# Patient Record
Sex: MALE | Race: WHITE | Employment: UNEMPLOYED | URBAN - METROPOLITAN AREA
[De-identification: names, ages, dates, MRNs, and addresses within clinical notes are randomized per-mention and may not be internally consistent; named-entity substitution may affect disease eponyms.]

---

## 2017-06-21 ENCOUNTER — HOSPITAL ENCOUNTER (OUTPATIENT)
Age: 32
Setting detail: OBSERVATION
Discharge: HOME | End: 2017-06-25
Attending: EMERGENCY MEDICINE | Admitting: HOSPITALIST
Payer: MEDICARE

## 2017-06-21 ENCOUNTER — APPOINTMENT (OUTPATIENT)
Dept: CT IMAGING | Age: 32
End: 2017-06-21
Attending: EMERGENCY MEDICINE
Payer: MEDICARE

## 2017-06-21 DIAGNOSIS — G40.909 SEIZURE DISORDER (HCC): Primary | ICD-10-CM

## 2017-06-21 DIAGNOSIS — T42.0X1S: ICD-10-CM

## 2017-06-21 DIAGNOSIS — G40.219 PARTIAL EPILEPSY WITH IMPAIRMENT OF CONSCIOUSNESS, INTRACTABLE (HCC): ICD-10-CM

## 2017-06-21 DIAGNOSIS — T42.0X1S ACCIDENTAL PHENYTOIN POISONING, SEQUELA: ICD-10-CM

## 2017-06-21 DIAGNOSIS — R26.81 UNSTEADY GAIT: ICD-10-CM

## 2017-06-21 DIAGNOSIS — G40.409 SYMPTOMATIC GENERALIZED EPILEPSY (HCC): ICD-10-CM

## 2017-06-21 LAB
ALBUMIN SERPL BCP-MCNC: 4.5 G/DL (ref 3.5–5)
ALBUMIN/GLOB SERPL: 1.1 {RATIO} (ref 1.1–2.2)
ALP SERPL-CCNC: 133 U/L (ref 45–117)
ALT SERPL-CCNC: 51 U/L (ref 12–78)
AMPHET UR QL SCN: NEGATIVE
ANION GAP BLD CALC-SCNC: 9 MMOL/L (ref 5–15)
APPEARANCE UR: CLEAR
AST SERPL W P-5'-P-CCNC: 25 U/L (ref 15–37)
BACTERIA URNS QL MICRO: NEGATIVE /HPF
BARBITURATES UR QL SCN: NEGATIVE
BASOPHILS # BLD AUTO: 0 K/UL (ref 0–0.1)
BASOPHILS # BLD: 0 % (ref 0–1)
BENZODIAZ UR QL: NEGATIVE
BILIRUB SERPL-MCNC: 0.4 MG/DL (ref 0.2–1)
BILIRUB UR QL CFM: NEGATIVE
BUN SERPL-MCNC: 13 MG/DL (ref 6–20)
BUN/CREAT SERPL: 14 (ref 12–20)
CALCIUM SERPL-MCNC: 9.6 MG/DL (ref 8.5–10.1)
CANNABINOIDS UR QL SCN: POSITIVE
CHLORIDE SERPL-SCNC: 104 MMOL/L (ref 97–108)
CK SERPL-CCNC: 159 U/L (ref 39–308)
CO2 SERPL-SCNC: 26 MMOL/L (ref 21–32)
COCAINE UR QL SCN: NEGATIVE
COLOR UR: ABNORMAL
CREAT SERPL-MCNC: 0.92 MG/DL (ref 0.7–1.3)
DRUG SCRN COMMENT,DRGCM: ABNORMAL
EOSINOPHIL # BLD: 0.2 K/UL (ref 0–0.4)
EOSINOPHIL NFR BLD: 3 % (ref 0–7)
EPITH CASTS URNS QL MICRO: ABNORMAL /LPF
ERYTHROCYTE [DISTWIDTH] IN BLOOD BY AUTOMATED COUNT: 12.9 % (ref 11.5–14.5)
GLOBULIN SER CALC-MCNC: 4.2 G/DL (ref 2–4)
GLUCOSE SERPL-MCNC: 96 MG/DL (ref 65–100)
GLUCOSE UR STRIP.AUTO-MCNC: NEGATIVE MG/DL
HCT VFR BLD AUTO: 44.6 % (ref 36.6–50.3)
HGB BLD-MCNC: 15.2 G/DL (ref 12.1–17)
HGB UR QL STRIP: NEGATIVE
HYALINE CASTS URNS QL MICRO: ABNORMAL /LPF (ref 0–5)
KETONES UR QL STRIP.AUTO: ABNORMAL MG/DL
LEUKOCYTE ESTERASE UR QL STRIP.AUTO: NEGATIVE
LYMPHOCYTES # BLD AUTO: 38 % (ref 12–49)
LYMPHOCYTES # BLD: 3 K/UL (ref 0.8–3.5)
MAGNESIUM SERPL-MCNC: 2.5 MG/DL (ref 1.6–2.4)
MCH RBC QN AUTO: 29.9 PG (ref 26–34)
MCHC RBC AUTO-ENTMCNC: 34.1 G/DL (ref 30–36.5)
MCV RBC AUTO: 87.8 FL (ref 80–99)
METHADONE UR QL: NEGATIVE
MONOCYTES # BLD: 0.6 K/UL (ref 0–1)
MONOCYTES NFR BLD AUTO: 8 % (ref 5–13)
NEUTS SEG # BLD: 4 K/UL (ref 1.8–8)
NEUTS SEG NFR BLD AUTO: 51 % (ref 32–75)
NITRITE UR QL STRIP.AUTO: NEGATIVE
OPIATES UR QL: NEGATIVE
PCP UR QL: NEGATIVE
PH UR STRIP: 6 [PH] (ref 5–8)
PLATELET # BLD AUTO: 285 K/UL (ref 150–400)
POTASSIUM SERPL-SCNC: 4.2 MMOL/L (ref 3.5–5.1)
PROT SERPL-MCNC: 8.7 G/DL (ref 6.4–8.2)
PROT UR STRIP-MCNC: NEGATIVE MG/DL
RBC # BLD AUTO: 5.08 M/UL (ref 4.1–5.7)
RBC #/AREA URNS HPF: ABNORMAL /HPF (ref 0–5)
SODIUM SERPL-SCNC: 139 MMOL/L (ref 136–145)
SP GR UR REFRACTOMETRY: 1.02 (ref 1–1.03)
UROBILINOGEN UR QL STRIP.AUTO: 1 EU/DL (ref 0.2–1)
WBC # BLD AUTO: 7.8 K/UL (ref 4.1–11.1)
WBC URNS QL MICRO: ABNORMAL /HPF (ref 0–4)

## 2017-06-21 PROCEDURE — 99285 EMERGENCY DEPT VISIT HI MDM: CPT

## 2017-06-21 PROCEDURE — 36415 COLL VENOUS BLD VENIPUNCTURE: CPT | Performed by: EMERGENCY MEDICINE

## 2017-06-21 PROCEDURE — 82550 ASSAY OF CK (CPK): CPT | Performed by: EMERGENCY MEDICINE

## 2017-06-21 PROCEDURE — 81001 URINALYSIS AUTO W/SCOPE: CPT | Performed by: EMERGENCY MEDICINE

## 2017-06-21 PROCEDURE — 80177 DRUG SCRN QUAN LEVETIRACETAM: CPT | Performed by: EMERGENCY MEDICINE

## 2017-06-21 PROCEDURE — 85025 COMPLETE CBC W/AUTO DIFF WBC: CPT | Performed by: EMERGENCY MEDICINE

## 2017-06-21 PROCEDURE — 99218 HC RM OBSERVATION: CPT

## 2017-06-21 PROCEDURE — 80053 COMPREHEN METABOLIC PANEL: CPT | Performed by: EMERGENCY MEDICINE

## 2017-06-21 PROCEDURE — 95816 EEG AWAKE AND DROWSY: CPT | Performed by: HOSPITALIST

## 2017-06-21 PROCEDURE — 80307 DRUG TEST PRSMV CHEM ANLYZR: CPT | Performed by: EMERGENCY MEDICINE

## 2017-06-21 PROCEDURE — 83735 ASSAY OF MAGNESIUM: CPT | Performed by: EMERGENCY MEDICINE

## 2017-06-21 PROCEDURE — 70450 CT HEAD/BRAIN W/O DYE: CPT

## 2017-06-21 RX ORDER — SODIUM CHLORIDE 0.9 % (FLUSH) 0.9 %
5-10 SYRINGE (ML) INJECTION AS NEEDED
Status: DISCONTINUED | OUTPATIENT
Start: 2017-06-21 | End: 2017-06-25 | Stop reason: HOSPADM

## 2017-06-21 RX ORDER — ENOXAPARIN SODIUM 100 MG/ML
40 INJECTION SUBCUTANEOUS EVERY 24 HOURS
Status: DISCONTINUED | OUTPATIENT
Start: 2017-06-21 | End: 2017-06-24

## 2017-06-21 RX ORDER — SODIUM CHLORIDE 0.9 % (FLUSH) 0.9 %
5-10 SYRINGE (ML) INJECTION EVERY 8 HOURS
Status: DISCONTINUED | OUTPATIENT
Start: 2017-06-21 | End: 2017-06-25 | Stop reason: HOSPADM

## 2017-06-21 NOTE — IP AVS SNAPSHOT
Current Discharge Medication List  
  
CONTINUE these medications which have CHANGED Dose & Instructions Dispensing Information Comments Morning Noon Evening Bedtime  
 lamoTRIgine 200 mg tablet Commonly known as: LaMICtal  
What changed:   
- medication strength 
- how much to take Your last dose was: Your next dose is:    
   
   
 Dose:  200 mg Take 1 Tab by mouth two (2) times a day. Indications: seizure disorder Quantity:  60 Tab Refills:  0 CONTINUE these medications which have NOT CHANGED Dose & Instructions Dispensing Information Comments Morning Noon Evening Bedtime  
 clonazePAM 1 mg tablet Commonly known as:  Sabrina Michel Your last dose was: Your next dose is:    
   
   
 Dose:  1 mg Take 1 Tab by mouth three (3) times daily. Max Daily Amount: 3 mg. Indications: MYOCLONIC EPILEPSY Quantity:  21 Tab Refills:  0 FLUoxetine 40 mg capsule Commonly known as:  PROzac Your last dose was: Your next dose is:    
   
   
 Dose:  40 mg Take 1 Cap by mouth daily. Quantity:  7 Cap Refills:  0  
     
   
   
   
  
 levETIRAcetam 750 mg tablet Commonly known as:  KEPPRA Your last dose was: Your next dose is:    
   
   
 Dose:  1500 mg Take 2 Tabs by mouth two (2) times a day. Indications: TONIC-CLONIC EPILEPSY TREATMENT ADJUNCT Quantity:  120 Tab Refills:  0  
     
   
   
   
  
 lisinopril 20 mg tablet Commonly known as:  Deanmanan Pérez Your last dose was: Your next dose is:    
   
   
 Dose:  20 mg Take 20 mg by mouth daily. Refills:  0  
     
   
   
   
  
 lovastatin 20 mg tablet Commonly known as:  MEVACOR Your last dose was: Your next dose is:    
   
   
 Dose:  20 mg Take 20 mg by mouth nightly. Refills:  0 STOP taking these medications  DILANTIN PO  
   
  
 hydroCHLOROthiazide 25 mg tablet Commonly known as:  HYDRODIURIL Where to Get Your Medications Information on where to get these meds will be given to you by the nurse or doctor. ! Ask your nurse or doctor about these medications  
  clonazePAM 1 mg tablet FLUoxetine 40 mg capsule  
 lamoTRIgine 200 mg tablet  
 levETIRAcetam 750 mg tablet

## 2017-06-21 NOTE — IP AVS SNAPSHOT
[address] [place] [address] 
[phone] Patient: Natan Samayoa MRN: [id number] TAD:[id number] You are allergic to the following Allergen Reactions Iodine Anaphylaxis Fish Containing Products Anaphylaxis Shellfish Containing Products Anaphylaxis Recent Documentation Height Weight BMI Smoking Status 1.778 m 119.7 kg 37.86 kg/m2 Current Every Day Smoker Unresulted Labs Order Current Status LAMOTRIGINE (LAMICTAL) In process Emergency Contacts Name Discharge Info Relation Home Work Mobile April Noss  Sister [23] [phone] Toy Solis  Other Relative [6] [phone] Nabeel Olivarez DISCHARGE CAREGIVER [3] Life Partner [7]   [phone] About your hospitalization You were admitted on:  June 21, 2017 You last received care in the:  [facility] 6S NEURO-SCI TELE You were discharged on:  June 25, 2017 Unit phone number:  [phone] Why you were hospitalized Your primary diagnosis was:  Symptomatic Generalized Epilepsy (Hcc) Your diagnoses also included:  History Of Craniotomy, Dilantin Toxicity, Marijuana Use, Nicotine Dependence Providers Seen During Your Hospitalizations Provider Role Specialty Primary office phone Elías Gale MD Attending Provider Emergency Medicine [phone] Zoltan Lynne MD Attending Provider Internal Medicine [phone] Frann Sandifer, MD Attending Provider Internal Medicine [phone] Your Primary Care Physician (PCP) Primary Care Physician Office Phone Office Fax OTHER, PHYS ** None ** ** None ** Follow-up Information Follow up With Details Comments Contact Info Dr Sesar Holliday, Neurologist  1 week or as scheduled following discharge from hospital   
 Phys MD Gina   Patient can only remember the practice name and not the physician Current Discharge Medication List  
  
CONTINUE these medications which have CHANGED Dose & Instructions Dispensing Information Comments Morning Noon Evening Bedtime  
 lamoTRIgine 200 mg tablet Commonly known as: LaMICtal  
What changed:   
- medication strength 
- how much to take Your last dose was: Your next dose is:    
   
   
 Dose:  200 mg Take 1 Tab by mouth two (2) times a day. Indications: seizure disorder Quantity:  60 Tab Refills:  0 CONTINUE these medications which have NOT CHANGED Dose & Instructions Dispensing Information Comments Morning Noon Evening Bedtime  
 clonazePAM 1 mg tablet Commonly known as:  Kaitlin Ny Your last dose was: Your next dose is:    
   
   
 Dose:  1 mg Take 1 Tab by mouth three (3) times daily. Max Daily Amount: 3 mg. Indications: MYOCLONIC EPILEPSY Quantity:  21 Tab Refills:  0 FLUoxetine 40 mg capsule Commonly known as:  PROzac Your last dose was: Your next dose is:    
   
   
 Dose:  40 mg Take 1 Cap by mouth daily. Quantity:  7 Cap Refills:  0  
     
   
   
   
  
 levETIRAcetam 750 mg tablet Commonly known as:  KEPPRA Your last dose was: Your next dose is:    
   
   
 Dose:  1500 mg Take 2 Tabs by mouth two (2) times a day. Indications: TONIC-CLONIC EPILEPSY TREATMENT ADJUNCT Quantity:  120 Tab Refills:  0  
     
   
   
   
  
 lisinopril 20 mg tablet Commonly known as:  Rexdedrick  Your last dose was: Your next dose is:    
   
   
 Dose:  20 mg Take 20 mg by mouth daily. Refills:  0  
     
   
   
   
  
 lovastatin 20 mg tablet Commonly known as:  MEVACOR Your last dose was: Your next dose is:    
   
   
 Dose:  20 mg Take 20 mg by mouth nightly. Refills:  0 STOP taking these medications  DILANTIN PO  
   
  
 hydroCHLOROthiazide 25 mg tablet Commonly known as:  HYDRODIURIL Where to Get Your Medications Information on where to get these meds will be given to you by the nurse or doctor. ! Ask your nurse or doctor about these medications  
  clonazePAM 1 mg tablet FLUoxetine 40 mg capsule  
 lamoTRIgine 200 mg tablet  
 levETIRAcetam 750 mg tablet Discharge Instructions ADDITIONAL CARE RECOMMENDATIONS:  
1. Take medications as prescribed. 2. Keep appointment with Neurologist as scheduled. 3. Dilantin has been stopped, and you should NOT take it again. 4. You should establish care under a PCP and Psychiatrist. 
5. HCTZ has been stopped due to low blood pressure and low potassium. DIET: Cardiac Diet ACTIVITY: activity as tolerated; no driving for at least 6 months WOUND CARE: none EQUIPMENT needed: none Stopping Smoking: Care Instructions Your Care Instructions Cigarette smokers crave the nicotine in cigarettes. Giving it up is much harder than simply changing a habit. Your body has to stop craving the nicotine. It is hard to quit, but you can do it. There are many tools that people use to quit smoking. You may find that combining tools works best for you. There are several steps to quitting. First you get ready to quit. Then you get support to help you. After that, you learn new skills and behaviors to become a nonsmoker. For many people, a necessary step is getting and using medicine. Your doctor will help you set up the plan that best meets your needs. You may want to attend a smoking cessation program to help you quit smoking. When you choose a program, look for one that has proven success. Ask your doctor for ideas.  You will greatly increase your chances of success if you take medicine as well as get counseling or join a cessation program. 
Some of the changes you feel when you first quit tobacco are uncomfortable. Your body will miss the nicotine at first, and you may feel short-tempered and grumpy. You may have trouble sleeping or concentrating. Medicine can help you deal with these symptoms. You may struggle with changing your smoking habits and rituals. The last step is the tricky one: Be prepared for the smoking urge to continue for a time. This is a lot to deal with, but keep at it. You will feel better. Follow-up care is a key part of your treatment and safety. Be sure to make and go to all appointments, and call your doctor if you are having problems. Its also a good idea to know your test results and keep a list of the medicines you take. How can you care for yourself at home? · Ask your family, friends, and coworkers for support. You have a better chance of quitting if you have help and support. · Join a support group, such as Nicotine Anonymous, for people who are trying to quit smoking. · Consider signing up for a smoking cessation program, such as the American Lung Association's Freedom from Smoking program. 
· Set a quit date. Pick your date carefully so that it is not right in the middle of a big deadline or stressful time. Once you quit, do not even take a puff. Get rid of all ashtrays and lighters after your last cigarette. Clean your house and your clothes so that they do not smell of smoke. · Learn how to be a nonsmoker. Think about ways you can avoid those things that make you reach for a cigarette. ¨ Avoid situations that put you at greatest risk for smoking. For some people, it is hard to have a drink with friends without smoking. For others, they might skip a coffee break with coworkers who smoke. ¨ Change your daily routine. Take a different route to work or eat a meal in a different place. · Cut down on stress. Calm yourself or release tension by doing an activity you enjoy, such as reading a book, taking a hot bath, or gardening. · Talk to your doctor or pharmacist about nicotine replacement therapy, which replaces the nicotine in your body. You still get nicotine but you do not use tobacco. Nicotine replacement products help you slowly reduce the amount of nicotine you need. These products come in several forms, many of them available over-the-counter: ¨ Nicotine patches ¨ Nicotine gum and lozenges ¨ Nicotine inhaler · Ask your doctor about bupropion (Wellbutrin) or varenicline (Chantix), which are prescription medicines. They do not contain nicotine. They help you by reducing withdrawal symptoms, such as stress and anxiety. · Some people find hypnosis, acupuncture, and massage helpful for ending the smoking habit. · Eat a healthy diet and get regular exercise. Having healthy habits will help your body move past its craving for nicotine. · Be prepared to keep trying. Most people are not successful the first few times they try to quit. Do not get mad at yourself if you smoke again. Make a list of things you learned and think about when you want to try again, such as next week, next month, or next year. Where can you learn more? Go to http://[employer].info/. Enter K723 in the search box to learn more about \"Stopping Smoking: Care Instructions. \" Current as of: March 20, 2017 Content Version: 11.3 © 1061-4734 [employer]. Care instructions adapted under license by [facility] (which disclaims liability or warranty for this information). If you have questions about a medical condition or this instruction, always ask your healthcare professional. [place] [address] any warranty or liability for your use of this information. Discharge Orders None [facility] Announcement We are excited to announce that we are making your provider's discharge notes available to you in [employer].   You will see these notes when they are completed and signed by the physician that discharged you from your recent hospital stay. If you have any questions or concerns about any information you see in [employer], please call the Health Information Department where you were seen or reach out to your Primary Care Provider for more information about your plan of care. Introducing [facility]! Mariela Hill introduces [employer] patient portal. Now you can access parts of your medical record, email your doctor's office, and request medication refills online. 1. In your internet browser, go to [email or web address] 2. Click on the First Time User? Click Here link in the Sign In box. You will see the New Member Sign Up page. 3. Enter your [employer] Access Code exactly as it appears below. You will not need to use this code after youve completed the sign-up process. If you do not sign up before the expiration date, you must request a new code. · [employer] Access Code: [email or web address] Expires: 8/9/2017  9:05 PM 
 
4. Enter the last four digits of your Social Security Number (xxxx) and Date of Birth (mm/dd/yyyy) as indicated and click Submit. You will be taken to the next sign-up page. 5. Create a [employer] ID. This will be your [employer] login ID and cannot be changed, so think of one that is secure and easy to remember. 6. Create a [employer] password. You can change your password at any time. 7. Enter your Password Reset Question and Answer. This can be used at a later time if you forget your password. 8. Enter your e-mail address. You will receive e-mail notification when new information is available in [address] 9. Click Sign Up. You can now view and download portions of your medical record. 10. Click the Download Summary menu link to download a portable copy of your medical information.  
 
If you have questions, please visit the Frequently Asked Questions section of the [employer]. Remember, MyChart is NOT to be used for urgent needs. For medical emergencies, dial 911. Now available from your iPhone and Android! General Information Please provide this summary of care documentation to your next provider. Patient Signature:  ____________________________________________________________ Date:  ____________________________________________________________  
  
Sylvester Bough Provider Signature:  ____________________________________________________________ Date:  ____________________________________________________________
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
## 2017-06-22 LAB
GLUCOSE BLD STRIP.AUTO-MCNC: 127 MG/DL (ref 65–100)
PHENYTOIN SERPL-MCNC: 30.2 UG/ML (ref 10–20)
SERVICE CMNT-IMP: ABNORMAL

## 2017-06-22 PROCEDURE — 99218 HC RM OBSERVATION: CPT

## 2017-06-22 PROCEDURE — 36415 COLL VENOUS BLD VENIPUNCTURE: CPT | Performed by: HOSPITALIST

## 2017-06-22 PROCEDURE — 80175 DRUG SCREEN QUAN LAMOTRIGINE: CPT | Performed by: PSYCHIATRY & NEUROLOGY

## 2017-06-22 PROCEDURE — 80177 DRUG SCRN QUAN LEVETIRACETAM: CPT | Performed by: HOSPITALIST

## 2017-06-22 PROCEDURE — 74011250637 HC RX REV CODE- 250/637: Performed by: HOSPITALIST

## 2017-06-22 PROCEDURE — 74011250636 HC RX REV CODE- 250/636: Performed by: NURSE PRACTITIONER

## 2017-06-22 PROCEDURE — 80185 ASSAY OF PHENYTOIN TOTAL: CPT | Performed by: HOSPITALIST

## 2017-06-22 PROCEDURE — 82962 GLUCOSE BLOOD TEST: CPT

## 2017-06-22 RX ORDER — HYDROCHLOROTHIAZIDE 25 MG/1
25 TABLET ORAL DAILY
Status: DISCONTINUED | OUTPATIENT
Start: 2017-06-22 | End: 2017-06-25 | Stop reason: HOSPADM

## 2017-06-22 RX ORDER — LISINOPRIL 10 MG/1
20 TABLET ORAL DAILY
Status: DISCONTINUED | OUTPATIENT
Start: 2017-06-22 | End: 2017-06-25 | Stop reason: HOSPADM

## 2017-06-22 RX ORDER — LOVASTATIN 20 MG/1
20 TABLET ORAL
Status: DISCONTINUED | OUTPATIENT
Start: 2017-06-22 | End: 2017-06-25 | Stop reason: HOSPADM

## 2017-06-22 RX ORDER — ACETAMINOPHEN 325 MG/1
650 TABLET ORAL
Status: DISCONTINUED | OUTPATIENT
Start: 2017-06-22 | End: 2017-06-25 | Stop reason: HOSPADM

## 2017-06-22 RX ORDER — HYDROMORPHONE HYDROCHLORIDE 1 MG/ML
1 INJECTION, SOLUTION INTRAMUSCULAR; INTRAVENOUS; SUBCUTANEOUS
Status: DISCONTINUED | OUTPATIENT
Start: 2017-06-22 | End: 2017-06-23

## 2017-06-22 RX ORDER — LEVETIRACETAM 500 MG/1
1500 TABLET ORAL 2 TIMES DAILY
Status: DISCONTINUED | OUTPATIENT
Start: 2017-06-22 | End: 2017-06-25 | Stop reason: HOSPADM

## 2017-06-22 RX ORDER — PHENYTOIN SODIUM 100 MG/1
300 CAPSULE, EXTENDED RELEASE ORAL 2 TIMES DAILY
Status: DISCONTINUED | OUTPATIENT
Start: 2017-06-22 | End: 2017-06-22

## 2017-06-22 RX ORDER — LAMOTRIGINE 100 MG/1
200 TABLET ORAL 2 TIMES DAILY
Status: DISCONTINUED | OUTPATIENT
Start: 2017-06-22 | End: 2017-06-25 | Stop reason: HOSPADM

## 2017-06-22 RX ORDER — CLONAZEPAM 1 MG/1
1 TABLET ORAL 3 TIMES DAILY
Status: DISCONTINUED | OUTPATIENT
Start: 2017-06-22 | End: 2017-06-25 | Stop reason: HOSPADM

## 2017-06-22 RX ORDER — FLUOXETINE HYDROCHLORIDE 20 MG/1
40 CAPSULE ORAL DAILY
Status: DISCONTINUED | OUTPATIENT
Start: 2017-06-22 | End: 2017-06-25 | Stop reason: HOSPADM

## 2017-06-22 RX ADMIN — Medication 10 ML: at 01:33

## 2017-06-22 RX ADMIN — HYDROMORPHONE HYDROCHLORIDE 1 MG: 1 INJECTION, SOLUTION INTRAMUSCULAR; INTRAVENOUS; SUBCUTANEOUS at 22:38

## 2017-06-22 RX ADMIN — CLONAZEPAM 1 MG: 1 TABLET ORAL at 16:31

## 2017-06-22 RX ADMIN — LEVETIRACETAM 1500 MG: 500 TABLET ORAL at 17:44

## 2017-06-22 RX ADMIN — Medication 10 ML: at 21:19

## 2017-06-22 RX ADMIN — Medication 5 ML: at 06:47

## 2017-06-22 RX ADMIN — HYDROCHLOROTHIAZIDE 25 MG: 25 TABLET ORAL at 09:01

## 2017-06-22 RX ADMIN — FLUOXETINE 40 MG: 20 CAPSULE ORAL at 09:01

## 2017-06-22 RX ADMIN — Medication 10 ML: at 14:00

## 2017-06-22 RX ADMIN — LISINOPRIL 20 MG: 10 TABLET ORAL at 09:01

## 2017-06-22 RX ADMIN — LEVETIRACETAM 1500 MG: 500 TABLET ORAL at 09:01

## 2017-06-22 RX ADMIN — LOVASTATIN 20 MG: 20 TABLET ORAL at 01:45

## 2017-06-22 RX ADMIN — CLONAZEPAM 1 MG: 1 TABLET ORAL at 09:01

## 2017-06-22 RX ADMIN — LOVASTATIN 20 MG: 20 TABLET ORAL at 21:19

## 2017-06-22 RX ADMIN — CLONAZEPAM 1 MG: 1 TABLET ORAL at 21:19

## 2017-06-22 RX ADMIN — LAMOTRIGINE 200 MG: 100 TABLET ORAL at 17:44

## 2017-06-22 RX ADMIN — LAMOTRIGINE 200 MG: 100 TABLET ORAL at 09:00

## 2017-06-22 NOTE — PROGRESS NOTES
Primary Nurse Aiyana Brown, JR and Gracie Dao RN performed a dual skin assessment on this patient No impairment noted  Dereck score is 23    [address] aware that patient corrected the medication after MD reviewed the med in ED. Patient first dose seizure medication are in the morning and need to review the doses with MD in the morning.

## 2017-06-22 NOTE — PROGRESS NOTES
Bedside and Verbal shift change report given to [address] (oncoming nurse) by JACKSON Sanford RN (offgoing nurse). Report given with SBAR, Kardex, Intake/Output, MAR and Recent Results.
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
## 2017-06-22 NOTE — CONSULTS
Consult dictated. Recurrent seizure. Currently dilantin toxic. Also on Clonazepam, Lamotrigine and Keppra. Hold dilantin. Check all levels  EEG no seizure activity.   Chelo Herrera MD

## 2017-06-22 NOTE — ED TRIAGE NOTES
Triage:  Pt to ED due to multiple seizure like events throughout the day. Pt states he has had a total of 7 seizures throughout the day. Pt states the seizures are grand mal in description with LOC following each event. Pt states he does note clearly remember the events and states when he comes out he is disoriented and has difficulty talking and interaction with others. Pt states he is uncertain of any falls pre/post seizures. Pt also states he has severe pain to base of his head and upper neck, Pt states the pain increases with rotation of his head more noted to the left. Pt also mentions that his lips are intermittently tingly. Pt states he generally aches and is fatigued from the seizure events.

## 2017-06-22 NOTE — PROGRESS NOTES
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 He was placed in OBS NSTU for breakthrough seizures and found to have supratherapeutic VPA level. Interval history / Subjective:   Still c/o numbness and tingling in lips, all over muscle pains, dizziness and lightheadedness; currently getting prepped for EEG     Assessment & Plan:     Breakthrough seizures on chronic seizure disorder (POA)  - no seizures since presentation  - supratherapeutic phenytoin level; held on admission; has been supratherapeutic for a long time per our EMR  - continue Lamictal and Keppra; Keppra level pending  - EEG in process  - Neuro consulted; need AEDs adjusted  - seizure precautions  - CT head wo contrast  right encephalomalacia, no acute  - sees Dr Estephanie Lozano, neurologist in [place], [place]    Supratherapeutic phenytoin level (POA) - management as above    Chronic HTN - controlled with lisinopril/HCTZ    HLD - lovastatin    Substance abuse (POA) - UDS +THC, cessation counseling    Nicotine dependence - cessation counseling, declined nicotine patch    Code status: Full  DVT prophylaxis: SCDs    Care Plan discussed with: Patient/Family, Nurse and   Disposition: Likely home today or tomorrow     Hospital Problems  Date Reviewed: 2014          Codes Class Noted POA    Seizures ([facility] [address] ICD-10-CM: R56.9  ICD-9-CM: 780.39  2014 Yes            Review of Systems:   Pertinent items are noted in HPI. Vital Signs:    Last 24hrs VS reviewed since prior progress note.  Most recent are:  Visit Vitals    /77 (BP 1 Location: Right arm, BP Patient Position: At rest)    Pulse 68    Temp 98 °F (36.7 °C)    Resp 19    Ht 5' 10\" (1.778 m)    Wt 117.7 kg (259 lb 7.7 oz)    SpO2 98%    BMI 37.23 kg/m2     No intake or output data in the 24 hours ending 06/22/17 0834     Physical Examination:     Constitutional:  awake, no acute distress, cooperative, pleasant, getting EEG    ENT:  oral mucosa moist, oropharynx benign  Neck supple   Resp:  CTA bilaterally, no wheezing/rhonchi/rales   CV:  regular rhythm, normal rate, no m/r/g appreciated, no edema, +pulses    GI:  +BS, soft, non distended, non tender     Musculoskeletal:  moves all extremities    Neurologic:  AAOx3, NFD     Skin:  warm, dry  Eyes:  PERRL, dysconjugate gaze    Data Review:    Review and/or order of clinical lab test  Review and/or order of tests in the radiology section of CPT  Review and/or order of tests in the medicine section of CPT    Labs:     Recent Labs      06/21/17 2059   WBC  7.8   HGB  15.2   HCT  44.6   PLT  285     Recent Labs      06/21/17 2059   NA  139   K  4.2   CL  104   CO2  26   BUN  13   CREA  0.92   GLU  96   CA  9.6   MG  2.5*     Recent Labs      06/21/17 2059   SGOT  25   ALT  51   AP  133*   TBILI  0.4   TP  8.7*   ALB  4.5   GLOB  4.2*     No results for input(s): INR, PTP, APTT in the last 72 hours. No lab exists for component: INREXT   No results for input(s): FE, TIBC, PSAT, FERR in the last 72 hours. No results found for: FOL, RBCF   No results for input(s): PH, PCO2, PO2 in the last 72 hours.   Recent Labs      06/21/17 2059   CPK  159     Lab Results   Component Value Date/Time    Cholesterol, total 168 07/18/2014 12:52 AM    HDL Cholesterol 36 07/18/2014 12:52 AM    LDL, calculated 112.8 07/18/2014 12:52 AM    Triglyceride 96 07/18/2014 12:52 AM    CHOL/HDL Ratio 4.7 07/18/2014 12:52 AM     Lab Results   Component Value Date/Time    Glucose (POC) 89 12/24/2016 11:57 PM Glucose (POC) 86 03/06/2012 11:06 AM    Glucose (POC) 127 01/26/2012 09:24 PM    Glucose (POC) 124 01/02/2012 07:52 AM     Lab Results   Component Value Date/Time    Color DARK YELLOW 06/21/2017 09:21 PM    Appearance CLEAR 06/21/2017 09:21 PM    Specific gravity 1.025 06/21/2017 09:21 PM    Specific gravity 1.022 01/27/2012 12:45 PM    pH (UA) 6.0 06/21/2017 09:21 PM    Protein NEGATIVE  06/21/2017 09:21 PM    Glucose NEGATIVE  06/21/2017 09:21 PM    Ketone TRACE 06/21/2017 09:21 PM    Bilirubin NEGATIVE  12/23/2016 08:30 PM    Urobilinogen 1.0 06/21/2017 09:21 PM    Nitrites NEGATIVE  06/21/2017 09:21 PM    Leukocyte Esterase NEGATIVE  06/21/2017 09:21 PM    Epithelial cells FEW 06/21/2017 09:21 PM    Bacteria NEGATIVE  06/21/2017 09:21 PM    WBC 0-4 06/21/2017 09:21 PM    RBC 0-5 06/21/2017 09:21 PM         Medications Reviewed:     Current Facility-Administered Medications   Medication Dose Route Frequency    clonazePAM (KlonoPIN) tablet 1 mg  1 mg Oral TID    FLUoxetine (PROzac) capsule 40 mg  40 mg Oral DAILY    hydroCHLOROthiazide (HYDRODIURIL) tablet 25 mg  25 mg Oral DAILY    lamoTRIgine (LaMICtal) tablet 200 mg  200 mg Oral BID    levETIRAcetam (KEPPRA) tablet 1,500 mg  1,500 mg Oral BID    lisinopril (PRINIVIL, ZESTRIL) tablet 20 mg  20 mg Oral DAILY    lovastatin (MEVACOR) tablet 20 mg  20 mg Oral QHS    sodium chloride (NS) flush 5-10 mL  5-10 mL IntraVENous Q8H    sodium chloride (NS) flush 5-10 mL  5-10 mL IntraVENous PRN    enoxaparin (LOVENOX) injection 40 mg  40 mg SubCUTAneous Q24H     ______________________________________________________________________  EXPECTED LENGTH OF STAY: - - -  ACTUAL LENGTH OF STAY:          0                 Terry Blancas MD

## 2017-06-22 NOTE — ED NOTES
Patient left department with medic on cardiac monitor for transportation to inpatient bed. Patient's VS at the time of transfer were /68, 98% on RA, denies pain at this time, 98.6 orally, HR 69 rhythm on the monitor. Patient was alert and oriented x 4 and denies further needs at time of transfer, still remains with no seizure like activity. TRANSFER - OUT REPORT:    Verbal report given to Elenita Natarajan RN(name) on Kacy Leos  being transferred to [address] [place] [address](unit) for routine progression of care       Report consisted of patients Situation, Background, Assessment and   Recommendations(SBAR). Information from the following report(s) SBAR, Kardex, ED Summary, [facility] and Recent Results was reviewed with the receiving nurse. Opportunity for questions and clarification was provided.

## 2017-06-22 NOTE — PROCEDURES
[address]   [address] [place] [address], [address]   EEG       Name:  Jc Abraham   MR#:  [id number]   :  1985   Account #:  [de-identified]    Date of Procedure:  2017   Date of Adm:  2017       REQUESTING PHYSICIAN: Dr. Roberto Mayer    HISTORY: The patient is a 28-year-old male who is being evaluated   for recurrent seizures. DESCRIPTION: This is an 18-channel EEG performed on an awake   patient. The dominant posterior background rhythm consists of   symmetric, well-modulated, medium voltage rhythms in the 8 Hz   frequency range out of the posterior head region. Frontal regions show   slower theta frequencies. The slowing is slightly more prominent in   the right frontal head region. Photic stimulation and hyperventilation   are not performed. Drowsiness and sleep architecture was not noted. EEG SUMMARY: Mildly abnormal EEG with mild slowing seen in both   the frontal head regions, right more than left. CLINICAL INTERPRETATION: This EEG shows mild focal slowing in   the right frontal area without any associated epileptiform features. No   ongoing electroclinical seizure activity was noted. Please correlate   clinically with the imaging studies.          Elmer Kennedy MD      AS / DEVYN   D:  2017   14:37   T:  2017   16:40   Job #:  [id number]

## 2017-06-22 NOTE — INTERDISCIPLINARY ROUNDS
IDR/SLIDR Summary          Patient: Miguelina Vazquez MRN: [id number]    Age: 28 y.o. YOB: 1985 Room/Bed: [facility]/   Admit Diagnosis: Seizures (HCC)  Principal Diagnosis: <principal problem not specified>   Goals: Seizure precautions  Readmission: NO  Quality Measure: Not applicable  VTE Prophylaxis: Chemical  Influenza Vaccine screening completed? YES  Pneumococcal Vaccine screening completed? NO  Mobility needs: No   Nutrition plan:No  Consults:Case Management    Financial concerns:No  Escalated to CM? YES  RRAT Score: 23   Interventions:  Testing due for pt today?  YES  LOS: 0 days Expected length of stay 1 days  Discharge plan: Home   PCP: Arabella Fuentes MD  Transportation needs: No    Days before discharge:one day until discharge   Discharge disposition: Home    Signed:     Laura Hernandez RN  6/22/2017  9:00 AM

## 2017-06-22 NOTE — ED NOTES
Patient ambulatory to treatment room and CT without issue. No seizure like activity at this time. Will continue to monitor, seizure pads placed on bed.

## 2017-06-22 NOTE — ED NOTES
Patient sleeping in room, aware of pending admission.  Pt remains with no seizure like activity since arrival.

## 2017-06-22 NOTE — ROUTINE PROCESS
TRANSFER - IN REPORT:    Verbal report received from Edmundo Aguilar RN(name) on Brandan Perkins  being received from ED(unit) for routine progression of care      Report consisted of patients Situation, Background, Assessment and   Recommendations(SBAR). Information from the following report(s) SBAR, Kardex, ED Summary, [facility] and Recent Results was reviewed with the receiving nurse. Opportunity for questions and clarification was provided. Assessment completed upon patients arrival to unit and care assumed.
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
## 2017-06-23 PROBLEM — Z98.890 HISTORY OF CRANIOTOMY: Status: ACTIVE | Noted: 2017-06-23

## 2017-06-23 LAB
ANION GAP BLD CALC-SCNC: 8 MMOL/L (ref 5–15)
BUN SERPL-MCNC: 14 MG/DL (ref 6–20)
BUN/CREAT SERPL: 17 (ref 12–20)
CALCIUM SERPL-MCNC: 8.6 MG/DL (ref 8.5–10.1)
CHLORIDE SERPL-SCNC: 104 MMOL/L (ref 97–108)
CO2 SERPL-SCNC: 25 MMOL/L (ref 21–32)
CREAT SERPL-MCNC: 0.84 MG/DL (ref 0.7–1.3)
ERYTHROCYTE [DISTWIDTH] IN BLOOD BY AUTOMATED COUNT: 12.7 % (ref 11.5–14.5)
GLUCOSE BLD STRIP.AUTO-MCNC: 108 MG/DL (ref 65–100)
GLUCOSE BLD STRIP.AUTO-MCNC: 111 MG/DL (ref 65–100)
GLUCOSE BLD STRIP.AUTO-MCNC: 81 MG/DL (ref 65–100)
GLUCOSE BLD STRIP.AUTO-MCNC: 95 MG/DL (ref 65–100)
GLUCOSE SERPL-MCNC: 114 MG/DL (ref 65–100)
HCT VFR BLD AUTO: 41.7 % (ref 36.6–50.3)
HGB BLD-MCNC: 14.5 G/DL (ref 12.1–17)
LEVETIRACETAM SERPL-MCNC: 1.3 UG/ML (ref 10–40)
LEVETIRACETAM SERPL-MCNC: NORMAL UG/ML (ref 10–40)
MAGNESIUM SERPL-MCNC: 2.2 MG/DL (ref 1.6–2.4)
MCH RBC QN AUTO: 30.2 PG (ref 26–34)
MCHC RBC AUTO-ENTMCNC: 34.8 G/DL (ref 30–36.5)
MCV RBC AUTO: 86.9 FL (ref 80–99)
PHENYTOIN SERPL-MCNC: 31.6 UG/ML (ref 10–20)
PLATELET # BLD AUTO: 244 K/UL (ref 150–400)
POTASSIUM SERPL-SCNC: 3.6 MMOL/L (ref 3.5–5.1)
RBC # BLD AUTO: 4.8 M/UL (ref 4.1–5.7)
SERVICE CMNT-IMP: ABNORMAL
SERVICE CMNT-IMP: ABNORMAL
SERVICE CMNT-IMP: NORMAL
SERVICE CMNT-IMP: NORMAL
SODIUM SERPL-SCNC: 137 MMOL/L (ref 136–145)
WBC # BLD AUTO: 5.9 K/UL (ref 4.1–11.1)

## 2017-06-23 PROCEDURE — 74011250637 HC RX REV CODE- 250/637: Performed by: HOSPITALIST

## 2017-06-23 PROCEDURE — 36415 COLL VENOUS BLD VENIPUNCTURE: CPT | Performed by: INTERNAL MEDICINE

## 2017-06-23 PROCEDURE — 82962 GLUCOSE BLOOD TEST: CPT

## 2017-06-23 PROCEDURE — 83735 ASSAY OF MAGNESIUM: CPT | Performed by: INTERNAL MEDICINE

## 2017-06-23 PROCEDURE — 80177 DRUG SCRN QUAN LEVETIRACETAM: CPT | Performed by: PSYCHIATRY & NEUROLOGY

## 2017-06-23 PROCEDURE — 74011250637 HC RX REV CODE- 250/637: Performed by: INTERNAL MEDICINE

## 2017-06-23 PROCEDURE — 80185 ASSAY OF PHENYTOIN TOTAL: CPT | Performed by: NURSE PRACTITIONER

## 2017-06-23 PROCEDURE — 96374 THER/PROPH/DIAG INJ IV PUSH: CPT

## 2017-06-23 PROCEDURE — 96361 HYDRATE IV INFUSION ADD-ON: CPT

## 2017-06-23 PROCEDURE — 80048 BASIC METABOLIC PNL TOTAL CA: CPT | Performed by: INTERNAL MEDICINE

## 2017-06-23 PROCEDURE — 85027 COMPLETE CBC AUTOMATED: CPT | Performed by: INTERNAL MEDICINE

## 2017-06-23 PROCEDURE — 99218 HC RM OBSERVATION: CPT

## 2017-06-23 PROCEDURE — 74011250636 HC RX REV CODE- 250/636: Performed by: NURSE PRACTITIONER

## 2017-06-23 PROCEDURE — 96376 TX/PRO/DX INJ SAME DRUG ADON: CPT

## 2017-06-23 RX ORDER — SODIUM CHLORIDE 9 MG/ML
125 INJECTION, SOLUTION INTRAVENOUS CONTINUOUS
Status: DISCONTINUED | OUTPATIENT
Start: 2017-06-23 | End: 2017-06-23

## 2017-06-23 RX ORDER — CYCLOBENZAPRINE HCL 10 MG
10 TABLET ORAL
Status: DISCONTINUED | OUTPATIENT
Start: 2017-06-23 | End: 2017-06-25 | Stop reason: HOSPADM

## 2017-06-23 RX ADMIN — LEVETIRACETAM 1500 MG: 500 TABLET ORAL at 08:38

## 2017-06-23 RX ADMIN — CLONAZEPAM 1 MG: 1 TABLET ORAL at 16:56

## 2017-06-23 RX ADMIN — Medication 10 ML: at 07:08

## 2017-06-23 RX ADMIN — HYDROMORPHONE HYDROCHLORIDE 1 MG: 1 INJECTION, SOLUTION INTRAMUSCULAR; INTRAVENOUS; SUBCUTANEOUS at 07:07

## 2017-06-23 RX ADMIN — FLUOXETINE 40 MG: 20 CAPSULE ORAL at 08:38

## 2017-06-23 RX ADMIN — CLONAZEPAM 1 MG: 1 TABLET ORAL at 21:34

## 2017-06-23 RX ADMIN — Medication 10 ML: at 21:37

## 2017-06-23 RX ADMIN — CLONAZEPAM 1 MG: 1 TABLET ORAL at 08:38

## 2017-06-23 RX ADMIN — LOVASTATIN 20 MG: 20 TABLET ORAL at 21:35

## 2017-06-23 RX ADMIN — LEVETIRACETAM 1500 MG: 500 TABLET ORAL at 19:22

## 2017-06-23 RX ADMIN — CYCLOBENZAPRINE HYDROCHLORIDE 10 MG: 10 TABLET, FILM COATED ORAL at 21:34

## 2017-06-23 RX ADMIN — SODIUM CHLORIDE 125 ML/HR: 900 INJECTION, SOLUTION INTRAVENOUS at 06:00

## 2017-06-23 RX ADMIN — HYDROCHLOROTHIAZIDE 25 MG: 25 TABLET ORAL at 08:38

## 2017-06-23 RX ADMIN — Medication 10 ML: at 14:00

## 2017-06-23 RX ADMIN — LISINOPRIL 20 MG: 10 TABLET ORAL at 08:39

## 2017-06-23 RX ADMIN — LAMOTRIGINE 200 MG: 100 TABLET ORAL at 19:22

## 2017-06-23 RX ADMIN — LAMOTRIGINE 200 MG: 100 TABLET ORAL at 08:38

## 2017-06-23 RX ADMIN — HYDROMORPHONE HYDROCHLORIDE 1 MG: 1 INJECTION, SOLUTION INTRAMUSCULAR; INTRAVENOUS; SUBCUTANEOUS at 02:28

## 2017-06-23 NOTE — PROGRESS NOTES
NAGI Patterson is a 28 y.o. male with hx of seizures, craniotomy related to TBI in 2000. On 4 anticonvulsants. Admitted after 7 GTC type seizures in 1 day. Says he is compliant with meds. Follows up with a neurologist in [place] where he lives. Dilantin levels remains toxic despite holding it for 2 days. Keppra level 0. Received 1500 mg BID yesterday. EXAM  Exam:  Visit Vitals    /72 (BP 1 Location: Right arm, BP Patient Position: At rest)    Pulse 79    Temp 98.2 °F (36.8 °C)    Resp 18    Ht 5' 10\" (1.778 m)    Wt 119.7 kg (263 lb 14.3 oz)    SpO2 97%    BMI 37.86 kg/m2     Gen:Alert  CV: RRR  Lungs: non labored breathing  Abd: non distending  Neuro: A&O x 3, no dysarthria or aphasia. Horizontal nystagmus  CN II-XII: PERRL, EOMI, face symmetric, tongue/palate midline  Motor: strength 5/5 all four ext  Sensory: intact to LT  Gait: Mild unsteady    LABS/ IMAGING  Keppra level 0  Dilantin level 30  EEG R frontal slow    ASSESSMENT  Hospital Problems  Date Reviewed: 7/31/2014          Codes Class Noted POA    History of craniotomy ICD-10-CM: Z98.890  ICD-9-CM: V45.89  6/23/2017 Unknown        Seizures ([facility] ICD-10-CM: R56.9  ICD-9-CM: 780.39  7/29/2014 Yes             PLAN  Confusing scenario-Dilantin level went up today despite not getting any yesterday and Keppra level 0 despite being on 1500 mg per day. Recommendation:    Continue to hold dilantin and repeat level tomorrow. If still remains elevated would discontinue it and start Trileptal 300 mg BID for 1 week and then 600 mg BID  If Keppra level again 0 tomorrow-would discontinue that as well and start Topiramate 50 mg BID for 1 week and then 100 mg  BID  Continue Lamotrigine 200 mg BID and follow levels tomorrow  We may be bale to DC him home tomorrow with this plan and he can f/u with his primary neurologist in [address] Leela Bruce MD      Follow up on L

## 2017-06-23 NOTE — PROGRESS NOTES
Bedside shift change report given to [address] [facility] [address] (oncoming nurse) by Andrew Glez (offgoing nurse). Report included the following information SBAR, Intake/Output, MAR, Recent Results and Cardiac Rhythm NSR.

## 2017-06-23 NOTE — INTERDISCIPLINARY ROUNDS
IDR/SLIDR Summary          Patient: Alejandrina Barnhart MRN: [id number]    Age: 28 y.o. YOB: 1985 Room/Bed: [facility]/   Admit Diagnosis: Seizures (HCC)  Principal Diagnosis: <principal problem not specified>   Goals: seizure free, pain control, dilantin levels, discharge planning  Readmission: NO  Quality Measure: Not applicable  VTE Prophylaxis: Chemical  Influenza Vaccine screening completed? NO  Pneumococcal Vaccine screening completed? NO  Mobility needs: No   Nutrition plan:Yes  Consults:    Financial concerns:No  Escalated to CM? NO  RRAT Score:  23  Interventions:  Testing due for pt today?  NO  LOS: 0 days Expected length of stay 2 days  Discharge plan: home after cleared by neuro   PCP: Arabella Fuentes MD  Transportation needs: Yes    Days before discharge:discharge pending  Discharge disposition: Home    Signed:     Nick Britt RN  6/23/2017  12:31 AM

## 2017-06-23 NOTE — PROGRESS NOTES
Called patient's reported neurologist Dr Luz Mendez office in [place], [place]. Nurse Navigator reports that patient has never been seen in Dr Luz Mendez office. Patient was only seen by Dr Sesar Holliday in hospital most recently was in 2012 and by Dr Luz Mendez partner in 2013 in hospital.     He was recently seen in a [facility] ED on 3/14/17 and, at that time, was on Lamictal 150mg po BID and Keppra 1000mg po BID.      Per NN, patient has bounced around from hospital to hospital.    Frann Sandifer, MD, MHS

## 2017-06-23 NOTE — PROGRESS NOTES
2115 Entered patient's room where he was sleeping in bed. Attempted several times to wake patient and finally arousable. Told patient I had nighttime meds for him. Patient then asked that I page on-call hospitalist to get him pain meds stating that his pain is 9/10, mostly in back and L hip region. Patient believes that pain is related to \"my 7 seizures I had yesterday that is finally settling in\". Patient also stated that \"Tylenol does not work for [facility]". Patient laying in bed without distress speaking calmly with nurse. Will page hospitalist.    2140 Paged hospitalist     [phone] NP returned call. Orders placed for Tylenol and Dilaudid PRN for pain. 2238 Gave patient PRN order of Dilaudid 1 mg IV for pain    2330 Patient stated pain coming down at 7/10    0045 Patient called nurse in room stating pain has gone back to 9/10 and asking to repage NP to request another dose of Dilaudid. Repaged NP    0130 Spoke with NP regarding pain meds. Unable to give additional dose at this time. Will give a dose at 0230 which is at 4 hours from last dose. 0300 Patient requested to know why he was not on IV fluids to flush out Dilantin. Explained that we could ask MD in the morning.  Patient then requested who to speak with regarding being fully tested for STDs while in hospital. Again stated would need to speak with day hospitalist.

## 2017-06-23 NOTE — PROGRESS NOTES
Entered patients room due to patient wanting  to speak to nursing administration. Pt complained that he was not ready to be discharged and that Dr Mihaela Garcia was planning to and he wanted her fired and another hospitalitis to see him. I spoke with patient and told him I would check on his plan and take it from there. I called patient advocacy to see patient. Patient advocacy at bedside. Patient visible upset over MD care and wanting another MD.. Patient is also on phone with Medicare filing a dispute about discharge. Spoke with Dr Mendel Bishop and informed of patients concerns he agreed to review chart. Neur logy at bedside and agreed patient  should stay another night. Myself and Brittney Garibay from Patient advocacy informed patient that Dr Mendel Bishop was not here for the weekend. And that Dr Brad Umaña was on call and still his MD. Patient was reluctant but agreed to stay with Neurology and Dr Con Simpson.

## 2017-06-23 NOTE — PROGRESS NOTES
Per  covering unit, patient has contacted Medicare to dispute his potential discharge. First of all, patient does not have a discharge order at this time, so he technically cannot dispute a discharge that does not exist.  Second, patient is in Observation status, and Observation stay discharges are not disputable.

## 2017-06-23 NOTE — PROGRESS NOTES
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 He was placed in OBS NSTU for breakthrough seizures and found to have supratherapeutic VPA level.      Interval history / Subjective:   Still c/o numbness and tingling in lips, all over muscle pains, dizziness and lightheadedness; overnight was c/o 9/10 pain and received IV Dilaudid x2     Assessment & Plan:     Breakthrough seizures on chronic seizure disorder (POA)  - no seizures since presentation  - supratherapeutic phenytoin level; held on admission; has been supratherapeutic for a long time per our EMR; repeat level is greater than on admission despite not getting Dilantin  - continue Lamictal and Keppra; Keppra level <1.3 --> 0 despite being on 1500mg po BID  - EEG  Mildly abnormal EEG with mild slowing seen in both the frontal head regions, right more than left  - Neuro consulted  - seizure precautions  - CT head wo contrast  right encephalomalacia, no acute  - sees Dr Jolie Wilkins, neurologist in [place], [place]  - refusing to be discharged until \"all my levels are therapeutic\"    Supratherapeutic phenytoin level (POA) - should not be resume on discharge    Chronic HTN - controlled with lisinopril/HCTZ    HLD - lovastatin    Substance abuse (POA) - UDS +THC, cessation counseling    Nicotine dependence - cessation counseling, declined nicotine patch    Code status: Full  DVT prophylaxis: SCDs    Care Plan discussed with: Patient/Family, Nurse,  and Consultant Neurology, Patient Advocacy  Disposition: Home when cleared by Neurology     Hospital Problems  Date Reviewed: 7/31/2014          Codes Class Noted POA    History of craniotomy ICD-10-CM: Z98.890  ICD-9-CM: V45.89  6/23/2017 Unknown        Seizures ([facility] ICD-10-CM: R56.9  ICD-9-CM: 780.39  7/29/2014 Yes            Review of Systems:   Pertinent items are noted in HPI. Vital Signs:    Last 24hrs VS reviewed since prior progress note. Most recent are:  Visit Vitals    /56 (BP 1 Location: Left arm, BP Patient Position: At rest;Lying right side)    Pulse 67    Temp 97.8 °F (36.6 °C)    Resp 12    Ht 5' 10\" (1.778 m)    Wt 119.7 kg (263 lb 14.3 oz)    SpO2 97%    BMI 37.86 kg/m2     No intake or output data in the 24 hours ending 06/23/17 1634     Physical Examination:     Refused physical exam by me today. Data Review:    Review and/or order of clinical lab test  Review and/or order of tests in the radiology section of CPT  Review and/or order of tests in the medicine section of CPT    Labs:     Recent Labs      06/23/17 0148 06/21/17 2059   WBC  5.9  7.8   HGB  14.5  15.2   HCT  41.7  44.6   PLT  244  285     Recent Labs      06/23/17 0148 06/21/17 2059   NA  137  139   K  3.6  4.2   CL  104  104   CO2  25  26   BUN  14  13   CREA  0.84  0.92   GLU  114*  96   CA  8.6  9.6   MG  2.2  2.5*     Recent Labs      06/21/17 2059   SGOT  25   ALT  51   AP  133*   TBILI  0.4   TP  8.7*   ALB  4.5   GLOB  4.2*     No results for input(s): INR, PTP, APTT in the last 72 hours. No lab exists for component: INREXT, INREXT   No results for input(s): FE, TIBC, PSAT, FERR in the last 72 hours. No results found for: FOL, RBCF   No results for input(s): PH, PCO2, PO2 in the last 72 hours.   Recent Labs      06/21/17 2059   CPK  159     Lab Results   Component Value Date/Time    Cholesterol, total 168 07/18/2014 12:52 AM    HDL Cholesterol 36 07/18/2014 12:52 AM    LDL, calculated 112.8 07/18/2014 12:52 AM    Triglyceride 96 07/18/2014 12:52 AM CHOL/HDL Ratio 4.7 07/18/2014 12:52 AM     Lab Results   Component Value Date/Time    Glucose (POC) 95 06/23/2017 11:54 AM    Glucose (POC) 81 06/23/2017 07:50 AM    Glucose (POC) 127 06/22/2017 09:41 PM    Glucose (POC) 89 12/24/2016 11:57 PM    Glucose (POC) 86 03/06/2012 11:06 AM    Glucose (POC) 127 01/26/2012 09:24 PM    Glucose (POC) 124 01/02/2012 07:52 AM     Lab Results   Component Value Date/Time    Color DARK YELLOW 06/21/2017 09:21 PM    Appearance CLEAR 06/21/2017 09:21 PM    Specific gravity 1.025 06/21/2017 09:21 PM    Specific gravity 1.022 01/27/2012 12:45 PM    pH (UA) 6.0 06/21/2017 09:21 PM    Protein NEGATIVE  06/21/2017 09:21 PM    Glucose NEGATIVE  06/21/2017 09:21 PM    Ketone TRACE 06/21/2017 09:21 PM    Bilirubin NEGATIVE  12/23/2016 08:30 PM    Urobilinogen 1.0 06/21/2017 09:21 PM    Nitrites NEGATIVE  06/21/2017 09:21 PM    Leukocyte Esterase NEGATIVE  06/21/2017 09:21 PM    Epithelial cells FEW 06/21/2017 09:21 PM    Bacteria NEGATIVE  06/21/2017 09:21 PM    WBC 0-4 06/21/2017 09:21 PM    RBC 0-5 06/21/2017 09:21 PM     Medications Reviewed:     Current Facility-Administered Medications   Medication Dose Route Frequency    cyclobenzaprine (FLEXERIL) tablet 10 mg  10 mg Oral TID PRN    clonazePAM (KlonoPIN) tablet 1 mg  1 mg Oral TID    FLUoxetine (PROzac) capsule 40 mg  40 mg Oral DAILY    hydroCHLOROthiazide (HYDRODIURIL) tablet 25 mg  25 mg Oral DAILY    lamoTRIgine (LaMICtal) tablet 200 mg  200 mg Oral BID    levETIRAcetam (KEPPRA) tablet 1,500 mg  1,500 mg Oral BID    lisinopril (PRINIVIL, ZESTRIL) tablet 20 mg  20 mg Oral DAILY    lovastatin (MEVACOR) tablet 20 mg  20 mg Oral QHS    acetaminophen (TYLENOL) tablet 650 mg  650 mg Oral Q6H PRN    sodium chloride (NS) flush 5-10 mL  5-10 mL IntraVENous Q8H    sodium chloride (NS) flush 5-10 mL  5-10 mL IntraVENous PRN    enoxaparin (LOVENOX) injection 40 mg  40 mg SubCUTAneous Q24H ______________________________________________________________________  EXPECTED LENGTH OF STAY: - - -  ACTUAL LENGTH OF STAY:          Maykel Bee MD

## 2017-06-23 NOTE — PROGRESS NOTES
Transition RN to the Bedside to assist Primary RN with patient education, medication educations, discharge instructions, and stroke core measure compliance. Discharge concerns initiated and discussed with patient, including clarification on \"who\" assists the patient at their home and instructions for when the home going patient should call their provider after discharge. Opportunity for questions and clarification was provided. Patient receptive to education: YES  Patient stated: I've been on those medications for quite some time, I know what are they for and their side effects  Barriers to Education: none  Diagnosis Education given:  NO    Length of stay: 0  Expected Day of Discharge: tbd  Ask if they have \"Help at Home\" & add to white board?   NO    Education Day #: 1    Medication Education Given:  YES  M in the box Medication name: enoxaparin (not taking), hctz, keppra, clonazepam      Stroke Education documented in Patient Education: NO  Core Measures Documented in [facility]:  Risk Factors: NO  Warning signs of stroke: NO  When to Activate 911: NO  Medication Education for Risk Factors: NO  Smoking cessation if applicable: NO  Written Education Given:  NO    Discharge NIH Completed: NO  Score: n/a    BRAINS: NO    Follow Up Appointment Made: NO  Date/Time if applicable: tbd

## 2017-06-24 PROBLEM — G40.409 SYMPTOMATIC GENERALIZED EPILEPSY (HCC): Status: ACTIVE | Noted: 2017-06-24

## 2017-06-24 LAB
ANION GAP BLD CALC-SCNC: 7 MMOL/L (ref 5–15)
BUN SERPL-MCNC: 10 MG/DL (ref 6–20)
BUN/CREAT SERPL: 14 (ref 12–20)
CALCIUM SERPL-MCNC: 9 MG/DL (ref 8.5–10.1)
CHLORIDE SERPL-SCNC: 104 MMOL/L (ref 97–108)
CK SERPL-CCNC: 84 U/L (ref 39–308)
CO2 SERPL-SCNC: 24 MMOL/L (ref 21–32)
CREAT SERPL-MCNC: 0.69 MG/DL (ref 0.7–1.3)
ERYTHROCYTE [DISTWIDTH] IN BLOOD BY AUTOMATED COUNT: 12.9 % (ref 11.5–14.5)
GLUCOSE BLD STRIP.AUTO-MCNC: 104 MG/DL (ref 65–100)
GLUCOSE BLD STRIP.AUTO-MCNC: 97 MG/DL (ref 65–100)
GLUCOSE SERPL-MCNC: 86 MG/DL (ref 65–100)
HCT VFR BLD AUTO: 41 % (ref 36.6–50.3)
HGB BLD-MCNC: 14.1 G/DL (ref 12.1–17)
MCH RBC QN AUTO: 30.2 PG (ref 26–34)
MCHC RBC AUTO-ENTMCNC: 34.4 G/DL (ref 30–36.5)
MCV RBC AUTO: 87.8 FL (ref 80–99)
PHENYTOIN SERPL-MCNC: 29.2 UG/ML (ref 10–20)
PLATELET # BLD AUTO: 216 K/UL (ref 150–400)
POTASSIUM SERPL-SCNC: 4 MMOL/L (ref 3.5–5.1)
RBC # BLD AUTO: 4.67 M/UL (ref 4.1–5.7)
SERVICE CMNT-IMP: ABNORMAL
SERVICE CMNT-IMP: NORMAL
SODIUM SERPL-SCNC: 135 MMOL/L (ref 136–145)
WBC # BLD AUTO: 5.4 K/UL (ref 4.1–11.1)

## 2017-06-24 PROCEDURE — 80048 BASIC METABOLIC PNL TOTAL CA: CPT | Performed by: INTERNAL MEDICINE

## 2017-06-24 PROCEDURE — 96376 TX/PRO/DX INJ SAME DRUG ADON: CPT

## 2017-06-24 PROCEDURE — 85027 COMPLETE CBC AUTOMATED: CPT | Performed by: INTERNAL MEDICINE

## 2017-06-24 PROCEDURE — 82962 GLUCOSE BLOOD TEST: CPT

## 2017-06-24 PROCEDURE — 82550 ASSAY OF CK (CPK): CPT | Performed by: INTERNAL MEDICINE

## 2017-06-24 PROCEDURE — 74011250637 HC RX REV CODE- 250/637: Performed by: INTERNAL MEDICINE

## 2017-06-24 PROCEDURE — 36415 COLL VENOUS BLD VENIPUNCTURE: CPT | Performed by: PSYCHIATRY & NEUROLOGY

## 2017-06-24 PROCEDURE — 74011250637 HC RX REV CODE- 250/637: Performed by: HOSPITALIST

## 2017-06-24 PROCEDURE — 99218 HC RM OBSERVATION: CPT

## 2017-06-24 PROCEDURE — 80185 ASSAY OF PHENYTOIN TOTAL: CPT | Performed by: PSYCHIATRY & NEUROLOGY

## 2017-06-24 RX ADMIN — CLONAZEPAM 1 MG: 1 TABLET ORAL at 09:54

## 2017-06-24 RX ADMIN — Medication 10 ML: at 18:59

## 2017-06-24 RX ADMIN — CLONAZEPAM 1 MG: 1 TABLET ORAL at 22:42

## 2017-06-24 RX ADMIN — LISINOPRIL 20 MG: 10 TABLET ORAL at 09:55

## 2017-06-24 RX ADMIN — LAMOTRIGINE 200 MG: 100 TABLET ORAL at 18:59

## 2017-06-24 RX ADMIN — CLONAZEPAM 1 MG: 1 TABLET ORAL at 18:58

## 2017-06-24 RX ADMIN — LEVETIRACETAM 1500 MG: 500 TABLET ORAL at 18:58

## 2017-06-24 RX ADMIN — Medication 10 ML: at 06:11

## 2017-06-24 RX ADMIN — LOVASTATIN 20 MG: 20 TABLET ORAL at 22:42

## 2017-06-24 RX ADMIN — LEVETIRACETAM 1500 MG: 500 TABLET ORAL at 09:54

## 2017-06-24 RX ADMIN — HYDROCHLOROTHIAZIDE 25 MG: 25 TABLET ORAL at 09:55

## 2017-06-24 RX ADMIN — LAMOTRIGINE 200 MG: 100 TABLET ORAL at 09:54

## 2017-06-24 RX ADMIN — FLUOXETINE 40 MG: 20 CAPSULE ORAL at 09:54

## 2017-06-24 RX ADMIN — CYCLOBENZAPRINE HYDROCHLORIDE 10 MG: 10 TABLET, FILM COATED ORAL at 09:55

## 2017-06-24 RX ADMIN — Medication 10 ML: at 22:42

## 2017-06-24 NOTE — ROUTINE PROCESS
Results for eKn Palencia (MRN [id number]) as of 6/24/2017 04:31   Ref. Range 6/24/2017 02:42   Phenytoin Latest Ref Range: 10.0 - 20.0 ug/mL 29.2 (HH)     Critical value reported by Lab contacted NP and advised of value. Orders to continue to monitor and hold Phenytoin.

## 2017-06-24 NOTE — INTERDISCIPLINARY ROUNDS
IDR/SLIDR Summary          Patient: Preston Esquivel MRN: [id number]    Age: 28 y.o. YOB: 1985 Room/Bed: [facility]/   Admit Diagnosis: Seizures (HCC)  Principal Diagnosis: <principal problem not specified>   Goals: pain management  Readmission: NO  Quality Measure: Not applicable  VTE Prophylaxis: Chemical  Influenza Vaccine screening completed? YES  Pneumococcal Vaccine screening completed? NO  Mobility needs: No   Nutrition plan:Yes  Consults:Case Management    Financial concerns:No  Escalated to CM? YES  RRAT Score: 23   Interventions:H2H  Testing due for pt today?  NO  LOS: 0 days Expected length of stay 2 days  Discharge plan: tbd   PCP: Arabella Fuentes, MD  Transportation needs: No    Days before discharge:ready for discharge  Discharge disposition: Home    Signed:     Etienne Rios  06/25/17  0114 am

## 2017-06-24 NOTE — PROGRESS NOTES
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 He was placed in OBS [facility]ST[facility] for breakthrough seizures and found to have supratherapeutic phenytoin level.      Interval history / Subjective:   Still c/o all over body aches; no seizures since before presentation to ED     Assessment & Plan:     Generalized body aches (POA)  - likely due to reported seizures  - check CK r/o rhabdo due to statin    Breakthrough seizures on chronic seizure disorder (POA)  - no seizures since presentation  - supratherapeutic phenytoin level; held on admission; has been supratherapeutic for a long time per our EMR; repeat level is greater than on admission despite not getting Dilantin but finally trending down  - continue Lamictal and Keppra; Keppra level <1.3 --> 0 despite being on 1500mg po BID  - EEG  Mildly abnormal EEG with mild slowing seen in both the frontal head regions, right more than left  - Neuro following  - seizure precautions  - CT head wo contrast  right encephalomalacia, no acute  - reported seening Dr Dallin Jones, neurologist in [place], [place]; spoke to Dr Tara Carter office on 17, patient has not been seen in the clinic, was last seen inpatient in  and does NOT have a scheduled appointment coming up  - refusing to be discharged until \"all my levels are therapeutic\"    Supratherapeutic phenytoin level (POA) - phenytoin should not be resumed on discharge    Chronic HTN - controlled with lisinopril/HCTZ    HLD - lovastatin    Substance abuse (POA) - UDS +THC, cessation counseling    Nicotine dependence - cessation counseling, declined nicotine patch    Code status: Full  DVT prophylaxis: SCDs    Care Plan discussed with: Patient/Family, Nurse and Consultant Neurology  Disposition: Home when cleared by Neurology     Hospital Problems  Date Reviewed: 7/31/2014          Codes Class Noted POA    History of craniotomy ICD-10-CM: Z98.890  ICD-9-CM: V45.89  6/23/2017 Unknown        Seizures ([facility] [address] ICD-10-CM: R56.9  ICD-9-CM: 780.39  7/29/2014 Yes            Review of Systems:   Pertinent items are noted in HPI. Vital Signs:    Last 24hrs VS reviewed since prior progress note.  Most recent are:  Visit Vitals    /72 (BP 1 Location: Right arm, BP Patient Position: At rest)    Pulse 84    Temp 97.8 °F (36.6 °C)    Resp 19    Ht 5' 10\" (1.778 m)    Wt 119.7 kg (263 lb 14.3 oz)    SpO2 98%    BMI 37.86 kg/m2     No intake or output data in the 24 hours ending 06/24/17 0904     Physical Examination:     Constitutional:  awake, no acute distress, cooperative   ENT:  oral mucosa moist, oropharynx benign  Neck supple   Resp:  CTA bilaterally, no wheezing/rhonchi/rales   CV:  regular rhythm, normal rate, no m/r/g appreciated, no edema, +pulses    GI:  +BS, soft, non distended, non tender     Musculoskeletal:  moves all extremities    Neurologic:  AAOx3, NFD   Skin:  warm, dry  Eyes:  PERRL, dysconjugate gaze    Data Review:    Review and/or order of clinical lab test  Review and/or order of tests in the radiology section of CPT  Review and/or order of tests in the medicine section of CPT    Labs:     Recent Labs      06/24/17 0242 06/23/17 0148   WBC  5.4  5.9   HGB  14.1  14.5   HCT  41.0  41.7   PLT  216  244     Recent Labs      06/24/17 0242  06/23/17 0148  06/21/17 2059   NA  135*  137  139   K  4.0  3.6  4.2   CL  104  104  104   CO2  24  25  26   BUN  10  14  13   CREA  0.69*  0.84  0.92   GLU  86  114*  96   CA  9.0  8.6  9.6   MG   --   2.2  2.5*     Recent Labs      06/21/17 2059   SGOT  25   ALT  51   AP 133*   TBILI  0.4   TP  8.7*   ALB  4.5   GLOB  4.2*     No results for input(s): INR, PTP, APTT in the last 72 hours. No lab exists for component: INREXT, INREXT   No results for input(s): FE, TIBC, PSAT, FERR in the last 72 hours. No results found for: FOL, RBCF   No results for input(s): PH, PCO2, PO2 in the last 72 hours.   Recent Labs      06/21/17 2059   CPK  159     Lab Results   Component Value Date/Time    Cholesterol, total 168 07/18/2014 12:52 AM    HDL Cholesterol 36 07/18/2014 12:52 AM    LDL, calculated 112.8 07/18/2014 12:52 AM    Triglyceride 96 07/18/2014 12:52 AM    CHOL/HDL Ratio 4.7 07/18/2014 12:52 AM     Lab Results   Component Value Date/Time    Glucose (POC) 97 06/24/2017 07:39 AM    Glucose (POC) 108 06/23/2017 09:34 PM    Glucose (POC) 111 06/23/2017 04:37 PM    Glucose (POC) 95 06/23/2017 11:54 AM    Glucose (POC) 81 06/23/2017 07:50 AM     Lab Results   Component Value Date/Time    Color DARK YELLOW 06/21/2017 09:21 PM    Appearance CLEAR 06/21/2017 09:21 PM    Specific gravity 1.025 06/21/2017 09:21 PM    Specific gravity 1.022 01/27/2012 12:45 PM    pH (UA) 6.0 06/21/2017 09:21 PM    Protein NEGATIVE  06/21/2017 09:21 PM    Glucose NEGATIVE  06/21/2017 09:21 PM    Ketone TRACE 06/21/2017 09:21 PM    Bilirubin NEGATIVE  12/23/2016 08:30 PM    Urobilinogen 1.0 06/21/2017 09:21 PM    Nitrites NEGATIVE  06/21/2017 09:21 PM    Leukocyte Esterase NEGATIVE  06/21/2017 09:21 PM    Epithelial cells FEW 06/21/2017 09:21 PM    Bacteria NEGATIVE  06/21/2017 09:21 PM    WBC 0-4 06/21/2017 09:21 PM    RBC 0-5 06/21/2017 09:21 PM     Medications Reviewed:     Current Facility-Administered Medications   Medication Dose Route Frequency    cyclobenzaprine (FLEXERIL) tablet 10 mg  10 mg Oral TID PRN    clonazePAM (KlonoPIN) tablet 1 mg  1 mg Oral TID    FLUoxetine (PROzac) capsule 40 mg  40 mg Oral DAILY    hydroCHLOROthiazide (HYDRODIURIL) tablet 25 mg  25 mg Oral DAILY    lamoTRIgine (LaMICtal) tablet 200 mg  200 mg Oral BID    levETIRAcetam (KEPPRA) tablet 1,500 mg  1,500 mg Oral BID    lisinopril (PRINIVIL, ZESTRIL) tablet 20 mg  20 mg Oral DAILY    lovastatin (MEVACOR) tablet 20 mg  20 mg Oral QHS    acetaminophen (TYLENOL) tablet 650 mg  650 mg Oral Q6H PRN    sodium chloride (NS) flush 5-10 mL  5-10 mL IntraVENous Q8H    sodium chloride (NS) flush 5-10 mL  5-10 mL IntraVENous PRN     ______________________________________________________________________  EXPECTED LENGTH OF STAY: - - -  ACTUAL LENGTH OF STAY:          0                 Claudette Massed, MD

## 2017-06-24 NOTE — ROUTINE PROCESS
Bedside shift change report given to Rosas Rivero RN (oncoming nurse) by Gibson Lobo RN (offgoing nurse). Report included the following information SBAR, MAR, Recent Results and Cardiac Rhythm NSR.

## 2017-06-24 NOTE — CONSULTS
Neurology Progress Note    Patient ID:  Jorge Johnson  [id number]  36 y.o.  1985    Chief Complaint: Seizures    Subjective:     81-year-old gentleman in the hospital for breakthrough seizures seen by Dr. Araya Said. No seizures since admitted. Phenytoin supratherapeutic trending down slowly today corrected at 29. He continues to take Keppra and lamotrigine as well. He feels tired today but no focal issues. Objective:       ROS:  Per HPI  All other 12 pt ROS negative    Meds:  Current Facility-Administered Medications   Medication Dose Route Frequency    cyclobenzaprine (FLEXERIL) tablet 10 mg  10 mg Oral TID PRN    clonazePAM (KlonoPIN) tablet 1 mg  1 mg Oral TID    FLUoxetine (PROzac) capsule 40 mg  40 mg Oral DAILY    hydroCHLOROthiazide (HYDRODIURIL) tablet 25 mg  25 mg Oral DAILY    lamoTRIgine (LaMICtal) tablet 200 mg  200 mg Oral BID    levETIRAcetam (KEPPRA) tablet 1,500 mg  1,500 mg Oral BID    lisinopril (PRINIVIL, ZESTRIL) tablet 20 mg  20 mg Oral DAILY    lovastatin (MEVACOR) tablet 20 mg  20 mg Oral QHS    acetaminophen (TYLENOL) tablet 650 mg  650 mg Oral Q6H PRN    sodium chloride (NS) flush 5-10 mL  5-10 mL IntraVENous Q8H    sodium chloride (NS) flush 5-10 mL  5-10 mL IntraVENous PRN       MRI Results (maximum last 3): No results found for this or any previous visit.     Lab Review   Recent Results (from the past 24 hour(s))   GLUCOSE, POC    Collection Time: 06/23/17 11:54 AM   Result Value Ref Range    Glucose (POC) 95 65 - 100 mg/dL    Performed by Baldo HAWLEY, POC    Collection Time: 06/23/17  4:37 PM   Result Value Ref Range    Glucose (POC) 111 (H) 65 - 100 mg/dL    Performed by Jas Mesa, POC    Collection Time: 06/23/17  9:34 PM   Result Value Ref Range    Glucose (POC) 108 (H) 65 - 100 mg/dL    Performed by Holly Postal (PCT)    PHENYTOIN    Collection Time: 06/24/17  2:42 AM   Result Value Ref Range    Phenytoin 29.2 (HH) 10.0 - 20.0 ug/mL   CBC W/O DIFF    Collection Time: 06/24/17  2:42 AM   Result Value Ref Range    WBC 5.4 4.1 - 11.1 K/uL    RBC 4.67 4.10 - 5.70 M/uL    HGB 14.1 12.1 - 17.0 g/dL    HCT 41.0 36.6 - 50.3 %    MCV 87.8 80.0 - 99.0 FL    MCH 30.2 26.0 - 34.0 PG    MCHC 34.4 30.0 - 36.5 g/dL    RDW 12.9 11.5 - 14.5 %    PLATELET 553 221 - 742 K/uL   METABOLIC PANEL, BASIC    Collection Time: 06/24/17  2:42 AM   Result Value Ref Range    Sodium 135 (L) 136 - 145 mmol/L    Potassium 4.0 3.5 - 5.1 mmol/L    Chloride 104 97 - 108 mmol/L    CO2 24 21 - 32 mmol/L    Anion gap 7 5 - 15 mmol/L    Glucose 86 65 - 100 mg/dL    BUN 10 6 - 20 MG/DL    Creatinine 0.69 (L) 0.70 - 1.30 MG/DL    BUN/Creatinine ratio 14 12 - 20      GFR est AA >60 >60 ml/min/1.73m2    GFR est non-AA >60 >60 ml/min/1.73m2    Calcium 9.0 8.5 - 10.1 MG/DL   CK    Collection Time: 06/24/17  2:42 AM   Result Value Ref Range    CK 84 39 - 308 U/L   GLUCOSE, POC    Collection Time: 06/24/17  7:39 AM   Result Value Ref Range    Glucose (POC) 97 65 - 100 mg/dL    Performed by Ava Stephenson        Additional comments:I reviewed the patient's new clinical lab test results. Patient Vitals for the past 8 hrs:   BP Temp Pulse Resp SpO2   06/24/17 0954 131/72 - 74 - -   06/24/17 0800 - - - - 98 %   06/24/17 0700 135/72 97.8 °F (36.6 °C) 84 19 98 %   06/24/17 0300 122/58 98 °F (36.7 °C) 72 17 100 %               Exam:  Visit Vitals    /72    Pulse 74    Temp 97.8 °F (36.6 °C)    Resp 19    Ht 5' 10\" (1.778 m)    Wt 119.7 kg (263 lb 14.3 oz)    SpO2 98%    BMI 37.86 kg/m2     Gen:  Well developed  CV: RRR  Lungs: non labored breathing  Abd: soft, non distended  Neuro: A&O x 3, no dysarthria or aphasia  CN II-XII: Disconjugate gaze with right exotropia PERRL, some mild nystagmus bilaterally, face symmetric, tongue/palate midline  Motor: Symmetric spontaneous movement  Sensory: intact to LT  DTRs: symmetric  COOR: no limb/truncal ataxia  Gait: Deferred    PROBLEM LIST:     Patient Active Problem List   Diagnosis Code    Adjustment disorder with depressed mood F43.21    Borderline personality disorder F60.3    Polysubstance overdose T50.901A    Non-compliance with treatment Z91.19    Malingering Z76.5    Polysubstance dependence ([address] F19.20    Localization-related (focal) (partial) epilepsy and epileptic syndromes with complex partial seizures, with intractable epilepsy G40.219    History of suicidal ideation Z86.59    Psychosis F29    Bipolar disorder with severe depression ([address] F31.4    Seizures ([address] R56.9    Dilantin toxicity T42.0X1A    Bipolar disorder, unspecified ([address] F31.9    Accidental phenytoin poisoning T42. 0X1A    Unsteady gait R26.81    History of craniotomy Z98.890       Assessment/Plan:      78-year-old gentleman with a history of seizure disorder secondary to TBI in the hospital for seizure activity and also has concomitant supratherapeutic Dilantin level. He does have some nystagmus on exam but unclear if this is his baseline. He is not showing overt signs of worrisome toxicity. Given that he is clinically stable without seizure activity and he is covered with multiple anticonvulsants I think is reasonable to begin discharge planning. He would like to be discharged as of tomorrow which is appropriate. I would not resume Dilantin. This will trend down slowly. Continue lamotrigine at the current dosing as well as keppra which was resumed yesterday. Stevenson Felder He [facility] [place] [facility]. Signing off. Please call with any questions.         Signed:  [address] [facility], DO  6/24/2017  10:51 AM

## 2017-06-25 VITALS
SYSTOLIC BLOOD PRESSURE: 115 MMHG | TEMPERATURE: 98 F | HEIGHT: 70 IN | OXYGEN SATURATION: 98 % | DIASTOLIC BLOOD PRESSURE: 53 MMHG | RESPIRATION RATE: 19 BRPM | BODY MASS INDEX: 37.78 KG/M2 | HEART RATE: 87 BPM | WEIGHT: 263.89 LBS

## 2017-06-25 PROBLEM — F17.200 NICOTINE DEPENDENCE: Chronic | Status: ACTIVE | Noted: 2017-06-25

## 2017-06-25 PROBLEM — R78.89 ELEVATED PHENYTOIN LEVEL: Chronic | Status: ACTIVE | Noted: 2017-06-25

## 2017-06-25 PROBLEM — F12.90 MARIJUANA USE: Status: ACTIVE | Noted: 2017-06-25

## 2017-06-25 LAB
LEVETIRACETAM SERPL-MCNC: 17.1 UG/ML (ref 10–40)
PHENYTOIN FREE MFR SERPL: 6 %
PHENYTOIN FREE SERPL-MCNC: 1.7 UG/ML (ref 1–2)
PHENYTOIN SERPL-MCNC: 26.4 UG/ML (ref 10–20)

## 2017-06-25 PROCEDURE — 80185 ASSAY OF PHENYTOIN TOTAL: CPT | Performed by: FAMILY MEDICINE

## 2017-06-25 PROCEDURE — 80186 ASSAY OF PHENYTOIN FREE: CPT | Performed by: FAMILY MEDICINE

## 2017-06-25 PROCEDURE — 74011250637 HC RX REV CODE- 250/637: Performed by: HOSPITALIST

## 2017-06-25 PROCEDURE — 99218 HC RM OBSERVATION: CPT

## 2017-06-25 PROCEDURE — 36415 COLL VENOUS BLD VENIPUNCTURE: CPT | Performed by: FAMILY MEDICINE

## 2017-06-25 RX ORDER — FLUOXETINE HYDROCHLORIDE 40 MG/1
40 CAPSULE ORAL DAILY
Qty: 7 CAP | Refills: 0 | Status: SHIPPED | OUTPATIENT
Start: 2017-06-25 | End: 2017-09-28

## 2017-06-25 RX ORDER — LAMOTRIGINE 200 MG/1
200 TABLET ORAL 2 TIMES DAILY
Qty: 60 TAB | Refills: 0 | Status: SHIPPED | OUTPATIENT
Start: 2017-06-25 | End: 2018-07-26

## 2017-06-25 RX ORDER — CLONAZEPAM 1 MG/1
1 TABLET ORAL 3 TIMES DAILY
Qty: 21 TAB | Refills: 0 | Status: SHIPPED | OUTPATIENT
Start: 2017-06-25

## 2017-06-25 RX ORDER — LEVETIRACETAM 750 MG/1
1500 TABLET ORAL 2 TIMES DAILY
Qty: 120 TAB | Refills: 0 | Status: SHIPPED | OUTPATIENT
Start: 2017-06-25 | End: 2018-07-26

## 2017-06-25 RX ADMIN — HYDROCHLOROTHIAZIDE 25 MG: 25 TABLET ORAL at 09:26

## 2017-06-25 RX ADMIN — FLUOXETINE 40 MG: 20 CAPSULE ORAL at 09:26

## 2017-06-25 RX ADMIN — LAMOTRIGINE 200 MG: 100 TABLET ORAL at 09:26

## 2017-06-25 RX ADMIN — LISINOPRIL 20 MG: 10 TABLET ORAL at 09:26

## 2017-06-25 RX ADMIN — LEVETIRACETAM 1500 MG: 500 TABLET ORAL at 09:26

## 2017-06-25 RX ADMIN — Medication 10 ML: at 05:15

## 2017-06-25 RX ADMIN — CLONAZEPAM 1 MG: 1 TABLET ORAL at 09:26

## 2017-06-25 NOTE — PROGRESS NOTES
[address] June 25, 2017       RE: Reshma Heard      To Whom It May Concern: This is to certify that Reshma Heard may return to school on Monday 6/26/17 or Tuesday 6/27/17. He was admitted to [address] [place] [address] from 6/21/17 - 6/25/17 and should be excused from class(es). Please feel free to contact my office at [phone] if you have any questions or concerns. Thank you for your assistance in this matter.       Sincerely,      Rubén Varner MD

## 2017-06-25 NOTE — ROUTINE PROCESS
Bedside shift change report given to Shea Aguilar RN (oncoming nurse) by Sudha Campbell RN (offgoing nurse). Report included the following information SBAR, MAR, Recent Results and Cardiac Rhythm NSR.

## 2017-06-25 NOTE — DISCHARGE SUMMARY
Discharge Summary       PATIENT ID: Thompson Abreu  MRN: [id number]   YOB: 1985    DATE OF ADMISSION: 6/21/2017  8:48 PM    DATE OF DISCHARGE: 6/25/17   PRIMARY CARE PROVIDER: Ramos Pierre MD     ATTENDING PHYSICIAN: Radha Salinas MD, S  DISCHARGING PROVIDER: Radha Salinas MD, S    To contact this individual call [phone] and ask the  to page. If unavailable ask to be transferred the Adult Hospitalist Department. 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  He was placed in OBS [facility] for breakthrough seizures and found to have phenytoin toxicity.     Generalized body aches (POA)  - likely due to reported seizures  - CK WNL  - resolved with prn Flexeril     Breakthrough seizures on chronic seizure disorder (POA)  - no seizures since presentation  - supratherapeutic phenytoin level; held on admission; has been supratherapeutic for a long time per our EMR; repeat level is greater than on admission despite not getting Dilantin but finally trending down  - continue Lamictal and Keppra; Keppra level <1.3 --> 0 despite being on 1500mg po BID  - EEG 6/22 Mildly abnormal EEG with mild slowing seen in both the frontal head regions, right more than left  - Neuro following  - seizure precautions  - CT head wo contrast 6/21 right encephalomalacia, no acute  - reported seening Dr Sydnee Olszewski, neurologist in [place], [place]; spoke to Dr Kiel Tenorio office on 6/23/17, patient has not been seen in the clinic, was last seen inpatient in 2012 and does NOT have a scheduled appointment coming up  - advised patient he should never again take phenytoin     Supratherapeutic phenytoin level (POA)   - asymptomatic  - phenytoin should not be resumed on discharge  - trending down; free phenytoin level WNL     Chronic HTN - controlled with lisinopril/HCTZ     HLD - lovastatin     Substance abuse (POA) - UDS +THC, cessation counseling     Nicotine dependence - cessation counseling, declined nicotine patch      DISCHARGE DIAGNOSES / PLAN:      Stable for discharge home to self-care. Follow up with Neurologist in [place]. Advised he should find and establish care with a PCP and Psychiatrist.       PENDING TEST RESULTS:   At the time of discharge the following test results are still pending: Keppra and Lamictal levels    FOLLOW UP APPOINTMENTS:    Follow-up Information     Follow up With Details Comments Contact Info    Dr Rosalinda Shoemaker, Neurologist  1 week or as scheduled following discharge from hospital            [address]:   1. Take medications as prescribed. 2. Keep appointment with Neurologist as scheduled. 3. Dilantin has been stopped, and you should NOT take it again. 4. You should establish care under a PCP and Psychiatrist.  5. HCTZ has been stopped due to low blood pressure and low potassium. DIET: Cardiac Diet    ACTIVITY: activity as tolerated; no driving for at least 6 months    WOUND CARE: none    EQUIPMENT needed: none      DISCHARGE MEDICATIONS:  Current Discharge Medication List      CONTINUE these medications which have CHANGED    Details   lamoTRIgine (LAMICTAL) 200 mg tablet Take 1 Tab by mouth two (2) times a day. Indications: seizure disorder  Qty: 60 Tab, Refills: 0      levETIRAcetam (KEPPRA) 750 mg tablet Take 2 Tabs by mouth two (2) times a day. Indications: TONIC-CLONIC EPILEPSY TREATMENT ADJUNCT  Qty: 120 Tab, Refills: 0      FLUoxetine (PROZAC) 40 mg capsule Take 1 Cap by mouth daily.   Qty: 7 Cap, Refills: 0      clonazePAM (KLONOPIN) 1 mg tablet Take 1 Tab by mouth three (3) times daily. Max Daily Amount: 3 mg. Indications: MYOCLONIC EPILEPSY  Qty: 21 Tab, Refills: 0         CONTINUE these medications which have NOT CHANGED    Details   lovastatin (MEVACOR) 20 mg tablet Take 20 mg by mouth nightly. lisinopril (PRINIVIL, ZESTRIL) 20 mg tablet Take 20 mg by mouth daily. STOP taking these medications       PHENYTOIN SODIUM EXTENDED (DILANTIN PO) Comments:   Reason for Stopping:         hydrochlorothiazide (HYDRODIURIL) 25 mg tablet Comments:   Reason for Stopping:             NOTIFY YOUR PHYSICIAN FOR ANY OF THE FOLLOWING:   Fever over 101 degrees for 24 hours. Chest pain, shortness of breath, fever, chills, nausea, vomiting, diarrhea, change in mentation, falling, weakness, bleeding. Severe pain or pain not relieved by medications. Or, any other signs or symptoms that you may have questions about.     DISPOSITION:  x  Home With:   OT  PT  HH  RN       Long term SNF/Inpatient Rehab    Independent/assisted living    Hospice    Other:       PATIENT CONDITION AT DISCHARGE:     Functional status    Poor     Deconditioned    x Independent      Cognition   x  Lucid     Forgetful     Dementia      Catheters/lines (plus indication)    Carrillo     PICC     PEG    x None      Code status   x  Full code     DNR      PHYSICAL EXAMINATION AT DISCHARGE:  Visit Vitals    BP 99/48 (BP 1 Location: Left arm)    Pulse 88    Temp 98 °F (36.7 °C)    Resp 18    Ht 5' 10\" (1.778 m)    Wt 119.7 kg (263 lb 14.3 oz)    SpO2 98%    BMI 37.86 kg/m2     Constitutional: Enolia Moritz, no acute distress, cooperative   ENT:  oral mucosa moist, oropharynx benign  Neck supple   Resp:  CTA bilaterally, no wheezing/rhonchi/rales   CV:  regular rhythm, normal rate, no m/r/g appreciated, no edema, +pulses    GI:  +BS, soft, non distended, non tender     Musculoskeletal:  moves all extremities    Neurologic:  AAOx3, NFD   Skin:  warm, dry  Eyes:  PERRL, dysconjugate gaze    Labs  Recent Results (from the past 24 hour(s))   GLUCOSE, POC    Collection Time: 06/24/17 11:36 AM   Result Value Ref Range    Glucose (POC) 104 (H) 65 - 100 mg/dL    Performed by Amy Mendoza    PHENYTOIN W RFLX FREE PHENYTOIN    Collection Time: 06/25/17  2:42 AM   Result Value Ref Range    Phenytoin 26.4 (HH) 10.0 - 20.0 ug/mL   PHENYTOIN, FREE PROFILE    Collection Time: 06/25/17  2:42 AM   Result Value Ref Range    Phenytoin, free 1.7 1.0 - 2.0 ug/mL    Phenytoin free:total ratio 6 %     Recent Labs       06/24/17 0242 06/23/17 0148   WBC  5.4  5.9   HGB  14.1  14.5   HCT  41.0  41.7   PLT  216  244            Recent Labs       06/24/17 0242 06/23/17 0148 06/21/17 2059   NA  135*  137  139   K  4.0  3.6  4.2   CL  104  104  104   CO2  24  25  26   BUN  10  14  13   CREA  0.69*  0.84  0.92   GLU  86  114*  96   CA  9.0  8.6  9.6   MG   --   2.2  2.5*          Recent Labs       06/21/17 2059   SGOT  25   ALT  51   AP  133*   TBILI  0.4   TP  8.7*   ALB  4.5   GLOB  4.2*      No results for input(s): INR, PTP, APTT in the last 72 hours.     No lab exists for component: INREXT, INREXT   No results for input(s): FE, TIBC, PSAT, FERR in the last 72 hours. No results found for: FOL, RBCF   No results for input(s): PH, PCO2, PO2 in the last 72 hours.       Recent Labs       06/21/17 2059   CPK  159        CHRONIC MEDICAL DIAGNOSES:  Problem List as of 6/25/2017  Date Reviewed: 6/25/2017          Codes Class Noted - Resolved    Marijuana use ICD-10-CM: F12.10  ICD-9-CM: 305.20  6/25/2017 - Present        Nicotine dependence (Chronic) ICD-10-CM: F17.200  ICD-9-CM: 305.1  6/25/2017 - Present        * (Principal)Symptomatic generalized epilepsy ([facility] ICD-10-CM: G40.409  ICD-9-CM: 345.90  6/24/2017 - Present        History of craniotomy ICD-10-CM: Z98.890  ICD-9-CM: V45.89  6/23/2017 - Present        Unsteady gait ICD-10-CM: R26.81  ICD-9-CM: 781.2  12/24/2016 - Present        Accidental phenytoin poisoning ICD-10-CM: T42.0X1A  ICD-9-CM: 966.1, E855.0  12/23/2016 - Present        Bipolar disorder, unspecified ([facility] ICD-10-CM: F31.9  ICD-9-CM: 296.80  9/17/2016 - Present        Dilantin toxicity ICD-10-CM: T42.0X1A  ICD-9-CM: 966.1, E980.4  9/6/2016 - Present        Bipolar disorder with severe depression ([facility] ICD-10-CM: F31.4  ICD-9-CM: 296.53  7/22/2014 - Present        History of suicidal ideation ICD-10-CM: Z86.59  ICD-9-CM: V11.8  7/19/2014 - Present        Psychosis ICD-10-CM: F29  ICD-9-CM: 298.9  7/19/2014 - Present        Localization-related (focal) (partial) epilepsy and epileptic syndromes with complex partial seizures, with intractable epilepsy ICD-10-CM: G40.219  ICD-9-CM: 345.41  7/1/2013 - Present        Non-compliance with treatment (Chronic) ICD-10-CM: Z91.19  ICD-9-CM: V15.81  3/7/2012 - Present        Malingering ICD-10-CM: Z76.5  ICD-9-CM: V65.2  3/7/2012 - Present    Overview Signed 3/7/2012  6:21 PM by Enedina Fairchild MD     Vs fictitious disorder             Polysubstance dependence ([facility] (Chronic) ICD-10-CM: X31.22  ICD-9-CM: 304.80  3/7/2012 - Present        Polysubstance overdose ICD-10-CM: T50.901A  ICD-9-CM: 977.9, E980.5 Chronic 1/27/2012 - Present        Adjustment disorder with depressed mood ICD-10-CM: F43.21  ICD-9-CM: 309.0  1/3/2012 - Present        Borderline personality disorder ICD-10-CM: F60.3  ICD-9-CM: 301.83  1/3/2012 - Present        RESOLVED: Seizures ([facility] ICD-10-CM: R56.9  ICD-9-CM: 780.39  7/29/2014 - 6/24/2017        RESOLVED: Opioid overdose ICD-10-CM: H70.0I1P  ICD-9-CM: 965.09, E980.0  7/18/2014 - 7/19/2014        RESOLVED: Narcotic overdose ICD-10-CM: T40.601A  ICD-9-CM: 967.9, E980.2  7/18/2014 - 7/19/2014              Signed:   Reggie Beal MD  6/25/2017  9:12 AM

## 2017-06-25 NOTE — DISCHARGE INSTRUCTIONS
ADDITIONAL CARE RECOMMENDATIONS:   1. Take medications as prescribed. 2. Keep appointment with Neurologist as scheduled. 3. Dilantin has been stopped, and you should NOT take it again. 4. You should establish care under a PCP and Psychiatrist.  5. HCTZ has been stopped due to low blood pressure and low potassium. DIET: Cardiac Diet    ACTIVITY: activity as tolerated; no driving for at least 6 months    WOUND CARE: none    EQUIPMENT needed: none     Stopping Smoking: Care Instructions  Your Care Instructions  Cigarette smokers crave the nicotine in cigarettes. Giving it up is much harder than simply changing a habit. Your body has to stop craving the nicotine. It is hard to quit, but you can do it. There are many tools that people use to quit smoking. You may find that combining tools works best for you. There are several steps to quitting. First you get ready to quit. Then you get support to help you. After that, you learn new skills and behaviors to become a nonsmoker. For many people, a necessary step is getting and using medicine. Your doctor will help you set up the plan that best meets your needs. You may want to attend a smoking cessation program to help you quit smoking. When you choose a program, look for one that has proven success. Ask your doctor for ideas. You will greatly increase your chances of success if you take medicine as well as get counseling or join a cessation program.  Some of the changes you feel when you first quit tobacco are uncomfortable. Your body will miss the nicotine at first, and you may feel short-tempered and grumpy. You may have trouble sleeping or concentrating. Medicine can help you deal with these symptoms. You may struggle with changing your smoking habits and rituals. The last step is the tricky one: Be prepared for the smoking urge to continue for a time. This is a lot to deal with, but keep at it. You will feel better.   Follow-up care is a key part of your treatment and safety. Be sure to make and go to all appointments, and call your doctor if you are having problems. Its also a good idea to know your test results and keep a list of the medicines you take. How can you care for yourself at home? · Ask your family, friends, and coworkers for support. You have a better chance of quitting if you have help and support. · Join a support group, such as Nicotine Anonymous, for people who are trying to quit smoking. · Consider signing up for a smoking cessation program, such as the American Lung Association's Freedom from Smoking program.  · Set a quit date. Pick your date carefully so that it is not right in the middle of a big deadline or stressful time. Once you quit, do not even take a puff. Get rid of all ashtrays and lighters after your last cigarette. Clean your house and your clothes so that they do not smell of smoke. · Learn how to be a nonsmoker. Think about ways you can avoid those things that make you reach for a cigarette. ¨ Avoid situations that put you at greatest risk for smoking. For some people, it is hard to have a drink with friends without smoking. For others, they might skip a coffee break with coworkers who smoke. ¨ Change your daily routine. Take a different route to work or eat a meal in a different place. · Cut down on stress. Calm yourself or release tension by doing an activity you enjoy, such as reading a book, taking a hot bath, or gardening. · Talk to your doctor or pharmacist about nicotine replacement therapy, which replaces the nicotine in your body. You still get nicotine but you do not use tobacco. Nicotine replacement products help you slowly reduce the amount of nicotine you need.  These products come in several forms, many of them available over-the-counter:  ¨ Nicotine patches  ¨ Nicotine gum and lozenges  ¨ Nicotine inhaler  · Ask your doctor about bupropion (Wellbutrin) or varenicline (Chantix), which are prescription medicines. They do not contain nicotine. They help you by reducing withdrawal symptoms, such as stress and anxiety. · Some people find hypnosis, acupuncture, and massage helpful for ending the smoking habit. · Eat a healthy diet and get regular exercise. Having healthy habits will help your body move past its craving for nicotine. · Be prepared to keep trying. Most people are not successful the first few times they try to quit. Do not get mad at yourself if you smoke again. Make a list of things you learned and think about when you want to try again, such as next week, next month, or next year. Where can you learn more? Go to http://geetaTailsterdana.info/. Enter T706 in the search box to learn more about \"Stopping Smoking: Care Instructions. \"  Current as of: March 20, 2017  Content Version: 11.3  © 4201-2347 [employer]. Care instructions adapted under license by [employer] (which disclaims liability or warranty for this information). If you have questions about a medical condition or this instruction, always ask your healthcare professional. [address] any warranty or liability for your use of this information.

## 2017-06-25 NOTE — ROUTINE PROCESS
Results for Leana Staley (MRN [id number]) as of 6/25/2017 05:40   Ref. Range 6/23/2017 21:34 6/24/2017 02:42 6/24/2017 07:39 6/24/2017 11:36 6/25/2017 02:42   Phenytoin Latest Ref Range: 10.0 - 20.0 ug/mL  29.2 (HH)   26.4 (HH)   Phenytoin, free Latest Ref Range: 1.0 - 2.0 ug/mL     1.7   Phenytoin free:total ratio Latest Units: %     6     Critical lab value received from Heladio in lab, MD notified order to continue monitoring.

## 2017-06-25 NOTE — PROGRESS NOTES
Bedside and Verbal shift change report given to Yvette Espinal RN (oncoming nurse) by Carlos Dominguez RN (offgoing nurse). Report included the following information SBAR, Kardex, MAR, Recent Results and Cardiac Rhythm NSR.

## 2017-06-25 NOTE — PROGRESS NOTES
I have reviewed discharge instructions with the patient. The patient verbalized understanding. Patient declined prescription for keppra stating he has several months supply. Patient was given a return to school note. Belongings with patient. Patient being discharged to home via bus voucher and security ride to [facility] bus line.

## 2017-06-26 LAB — LAMOTRIGINE SERPL-MCNC: 2.2 UG/ML (ref 2–20)

## 2017-09-26 ENCOUNTER — HOSPITAL ENCOUNTER (EMERGENCY)
Age: 32
Discharge: HOME | End: 2017-09-27
Attending: EMERGENCY MEDICINE
Payer: MEDICARE

## 2017-09-26 ENCOUNTER — APPOINTMENT (OUTPATIENT)
Dept: GENERAL RADIOLOGY | Age: 32
End: 2017-09-26
Attending: PHYSICIAN ASSISTANT
Payer: MEDICARE

## 2017-09-26 VITALS
HEIGHT: 69 IN | BODY MASS INDEX: 39.09 KG/M2 | HEART RATE: 83 BPM | OXYGEN SATURATION: 98 % | RESPIRATION RATE: 20 BRPM | DIASTOLIC BLOOD PRESSURE: 84 MMHG | SYSTOLIC BLOOD PRESSURE: 147 MMHG | TEMPERATURE: 99 F | WEIGHT: 263.89 LBS

## 2017-09-26 DIAGNOSIS — F17.200 NICOTINE DEPENDENCE, UNCOMPLICATED, UNSPECIFIED NICOTINE PRODUCT TYPE: ICD-10-CM

## 2017-09-26 DIAGNOSIS — M25.512 ACUTE PAIN OF LEFT SHOULDER: Primary | ICD-10-CM

## 2017-09-26 PROCEDURE — 99283 EMERGENCY DEPT VISIT LOW MDM: CPT

## 2017-09-26 PROCEDURE — 73030 X-RAY EXAM OF SHOULDER: CPT

## 2017-09-27 PROCEDURE — 74011250637 HC RX REV CODE- 250/637: Performed by: PHYSICIAN ASSISTANT

## 2017-09-27 RX ORDER — HYDROCODONE BITARTRATE AND ACETAMINOPHEN 5; 325 MG/1; MG/1
1 TABLET ORAL
Qty: 10 TAB | Refills: 0 | Status: SHIPPED | OUTPATIENT
Start: 2017-09-27 | End: 2017-10-26

## 2017-09-27 RX ORDER — NAPROXEN 500 MG/1
500 TABLET ORAL 2 TIMES DAILY WITH MEALS
Qty: 20 TAB | Refills: 0 | Status: SHIPPED | OUTPATIENT
Start: 2017-09-27 | End: 2017-10-07

## 2017-09-27 RX ORDER — OXYCODONE AND ACETAMINOPHEN 5; 325 MG/1; MG/1
1 TABLET ORAL
Status: COMPLETED | OUTPATIENT
Start: 2017-09-27 | End: 2017-09-27

## 2017-09-27 RX ORDER — NAPROXEN 250 MG/1
500 TABLET ORAL ONCE
Status: COMPLETED | OUTPATIENT
Start: 2017-09-27 | End: 2017-09-27

## 2017-09-27 RX ORDER — CYCLOBENZAPRINE HCL 10 MG
10 TABLET ORAL
Qty: 15 TAB | Refills: 0 | Status: SHIPPED | OUTPATIENT
Start: 2017-09-27

## 2017-09-27 RX ADMIN — NAPROXEN 500 MG: 250 TABLET ORAL at 00:45

## 2017-09-27 RX ADMIN — OXYCODONE HYDROCHLORIDE AND ACETAMINOPHEN 1 TABLET: 5; 325 TABLET ORAL at 00:45

## 2017-09-27 NOTE — ED NOTES
Discharge instructions given to patient by NICO Lal. Verbalized understanding of instructions. Patient discharged without difficulty. Patient discharged in stable condition via ambulation accompanied by spouse.

## 2017-09-27 NOTE — DISCHARGE INSTRUCTIONS
Shoulder Pain: Care Instructions  Your Care Instructions    You can hurt your shoulder by using it too much during an activity, such as fishing or baseball. It can also happen as part of the everyday wear and tear of getting older. Shoulder injuries can be slow to heal, but your shoulder should get better with time. Your doctor may recommend a sling to rest your shoulder. If you have injured your shoulder, you may need testing and treatment. Follow-up care is a key part of your treatment and safety. Be sure to make and go to all appointments, and call your doctor if you are having problems. It's also a good idea to know your test results and keep a list of the medicines you take. How can you care for yourself at home? · Take pain medicines exactly as directed. ¨ If the doctor gave you a prescription medicine for pain, take it as prescribed. ¨ If you are not taking a prescription pain medicine, ask your doctor if you can take an over-the-counter medicine. ¨ Do not take two or more pain medicines at the same time unless the doctor told you to. Many pain medicines contain acetaminophen, which is Tylenol. Too much acetaminophen (Tylenol) can be harmful. · If your doctor recommends that you wear a sling, use it as directed. Do not take it off before your doctor tells you to. · Put ice or a cold pack on the sore area for 10 to 20 minutes at a time. Put a thin cloth between the ice and your skin. · If there is no swelling, you can put moist heat, a heating pad, or a warm cloth on your shoulder. Some doctors suggest alternating between hot and cold. · Rest your shoulder for a few days. If your doctor recommends it, you can then begin gentle exercise of the shoulder, but do not lift anything heavy. When should you call for help? Call 911 anytime you think you may need emergency care. For example, call if:  · You have chest pain or pressure. This may occur with:  ¨ Sweating. ¨ Shortness of breath.   ¨ Nausea or vomiting. ¨ Pain that spreads from the chest to the neck, jaw, or one or both shoulders or arms. ¨ Dizziness or lightheadedness. ¨ A fast or uneven pulse. After calling 911, chew 1 adult-strength aspirin. Wait for an ambulance. Do not try to drive yourself. · Your arm or hand is cool or pale or changes color. Call your doctor now or seek immediate medical care if:  · You have signs of infection, such as:  ¨ Increased pain, swelling, warmth, or redness in your shoulder. ¨ Red streaks leading from a place on your shoulder. ¨ Pus draining from an area of your shoulder. ¨ Swollen lymph nodes in your neck, armpits, or groin. ¨ A fever. Watch closely for changes in your health, and be sure to contact your doctor if:  · You cannot use your shoulder. · Your shoulder does not get better as expected. Where can you learn more? Go to http://geeta-dana.info/. Enter Q988 in the search box to learn more about \"Shoulder Pain: Care Instructions. \"  Current as of: March 21, 2017  Content Version: 11.3  © 3593-1364 [employer]. Care instructions adapted under license by [employer] (which disclaims liability or warranty for this information). If you have questions about a medical condition or this instruction, always ask your healthcare professional. [address] any warranty or liability for your use of this information.
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
## 2017-09-27 NOTE — ED NOTES
Pt complaining of left shoulder pain that starts in neck and radiates down arm. Pt rates pain 8/10. Pt has large dogs and unsure of possible injury. Pt in no acute distress at this time. Call bell within reach.

## 2017-09-28 ENCOUNTER — APPOINTMENT (OUTPATIENT)
Dept: GENERAL RADIOLOGY | Age: 32
End: 2017-09-28
Attending: EMERGENCY MEDICINE
Payer: MEDICARE

## 2017-09-28 ENCOUNTER — HOSPITAL ENCOUNTER (OUTPATIENT)
Age: 32
Setting detail: OBSERVATION
Discharge: LEFT BEFORE DISCHARGE | End: 2017-09-29
Attending: EMERGENCY MEDICINE | Admitting: INTERNAL MEDICINE
Payer: MEDICARE

## 2017-09-28 DIAGNOSIS — Z98.890 HISTORY OF CRANIOTOMY: ICD-10-CM

## 2017-09-28 DIAGNOSIS — H54.40 BLIND RIGHT EYE: ICD-10-CM

## 2017-09-28 DIAGNOSIS — S06.9X9S TBI (TRAUMATIC BRAIN INJURY), WITH LOC OF UNSPECIFIED DURATION, SEQUELA: ICD-10-CM

## 2017-09-28 DIAGNOSIS — R56.9 SEIZURE (HCC): ICD-10-CM

## 2017-09-28 PROBLEM — E78.5 HYPERLIPIDEMIA: Status: ACTIVE | Noted: 2017-09-28

## 2017-09-28 PROBLEM — S06.9XAA TBI (TRAUMATIC BRAIN INJURY): Status: ACTIVE | Noted: 2017-09-28

## 2017-09-28 PROBLEM — I10 HTN (HYPERTENSION): Status: ACTIVE | Noted: 2017-09-28

## 2017-09-28 LAB
ALBUMIN SERPL-MCNC: 4 G/DL (ref 3.5–5)
ALBUMIN/GLOB SERPL: 1.6 {RATIO} (ref 1.1–2.2)
ALP SERPL-CCNC: 91 U/L (ref 45–117)
ALT SERPL-CCNC: 38 U/L (ref 12–78)
ANION GAP SERPL CALC-SCNC: 6 MMOL/L (ref 5–15)
AST SERPL-CCNC: 18 U/L (ref 15–37)
BASOPHILS # BLD: 0 K/UL (ref 0–0.1)
BASOPHILS NFR BLD: 0 % (ref 0–1)
BILIRUB SERPL-MCNC: 0.4 MG/DL (ref 0.2–1)
BUN SERPL-MCNC: 11 MG/DL (ref 6–20)
BUN/CREAT SERPL: 12 (ref 12–20)
CALCIUM SERPL-MCNC: 8.6 MG/DL (ref 8.5–10.1)
CHLORIDE SERPL-SCNC: 107 MMOL/L (ref 97–108)
CO2 SERPL-SCNC: 26 MMOL/L (ref 21–32)
CREAT SERPL-MCNC: 0.94 MG/DL (ref 0.7–1.3)
EOSINOPHIL # BLD: 0.2 K/UL (ref 0–0.4)
EOSINOPHIL NFR BLD: 2 % (ref 0–7)
ERYTHROCYTE [DISTWIDTH] IN BLOOD BY AUTOMATED COUNT: 13.1 % (ref 11.5–14.5)
ETHANOL SERPL-MCNC: <10 MG/DL
GLOBULIN SER CALC-MCNC: 2.5 G/DL (ref 2–4)
GLUCOSE SERPL-MCNC: 94 MG/DL (ref 65–100)
HCT VFR BLD AUTO: 39.9 % (ref 36.6–50.3)
HGB BLD-MCNC: 13.8 G/DL (ref 12.1–17)
LYMPHOCYTES # BLD: 2.1 K/UL (ref 0.8–3.5)
LYMPHOCYTES NFR BLD: 15 % (ref 12–49)
MAGNESIUM SERPL-MCNC: 2 MG/DL (ref 1.6–2.4)
MCH RBC QN AUTO: 30.3 PG (ref 26–34)
MCHC RBC AUTO-ENTMCNC: 34.6 G/DL (ref 30–36.5)
MCV RBC AUTO: 87.7 FL (ref 80–99)
MONOCYTES # BLD: 0.9 K/UL (ref 0–1)
MONOCYTES NFR BLD: 7 % (ref 5–13)
NEUTS SEG # BLD: 10.1 K/UL (ref 1.8–8)
NEUTS SEG NFR BLD: 76 % (ref 32–75)
PHENYTOIN SERPL-MCNC: <0.5 UG/ML (ref 10–20)
PLATELET # BLD AUTO: 220 K/UL (ref 150–400)
POTASSIUM SERPL-SCNC: 3.3 MMOL/L (ref 3.5–5.1)
PROT SERPL-MCNC: 6.5 G/DL (ref 6.4–8.2)
RBC # BLD AUTO: 4.55 M/UL (ref 4.1–5.7)
SODIUM SERPL-SCNC: 139 MMOL/L (ref 136–145)
WBC # BLD AUTO: 13.3 K/UL (ref 4.1–11.1)

## 2017-09-28 PROCEDURE — 83735 ASSAY OF MAGNESIUM: CPT | Performed by: EMERGENCY MEDICINE

## 2017-09-28 PROCEDURE — 74011250637 HC RX REV CODE- 250/637: Performed by: EMERGENCY MEDICINE

## 2017-09-28 PROCEDURE — 99285 EMERGENCY DEPT VISIT HI MDM: CPT

## 2017-09-28 PROCEDURE — 74011250636 HC RX REV CODE- 250/636: Performed by: EMERGENCY MEDICINE

## 2017-09-28 PROCEDURE — 74011000258 HC RX REV CODE- 258: Performed by: EMERGENCY MEDICINE

## 2017-09-28 PROCEDURE — 36415 COLL VENOUS BLD VENIPUNCTURE: CPT | Performed by: EMERGENCY MEDICINE

## 2017-09-28 PROCEDURE — 80185 ASSAY OF PHENYTOIN TOTAL: CPT | Performed by: EMERGENCY MEDICINE

## 2017-09-28 PROCEDURE — 80053 COMPREHEN METABOLIC PANEL: CPT | Performed by: EMERGENCY MEDICINE

## 2017-09-28 PROCEDURE — 80307 DRUG TEST PRSMV CHEM ANLYZR: CPT | Performed by: EMERGENCY MEDICINE

## 2017-09-28 PROCEDURE — 93005 ELECTROCARDIOGRAM TRACING: CPT

## 2017-09-28 PROCEDURE — 74011250637 HC RX REV CODE- 250/637: Performed by: INTERNAL MEDICINE

## 2017-09-28 PROCEDURE — 96365 THER/PROPH/DIAG IV INF INIT: CPT

## 2017-09-28 PROCEDURE — 99218 HC RM OBSERVATION: CPT

## 2017-09-28 PROCEDURE — 96374 THER/PROPH/DIAG INJ IV PUSH: CPT

## 2017-09-28 PROCEDURE — 94762 N-INVAS EAR/PLS OXIMTRY CONT: CPT

## 2017-09-28 PROCEDURE — 85025 COMPLETE CBC W/AUTO DIFF WBC: CPT | Performed by: EMERGENCY MEDICINE

## 2017-09-28 RX ORDER — PHENYTOIN SODIUM 100 MG/1
100 CAPSULE, EXTENDED RELEASE ORAL 3 TIMES DAILY
Status: DISCONTINUED | OUTPATIENT
Start: 2017-09-29 | End: 2017-09-29 | Stop reason: HOSPADM

## 2017-09-28 RX ORDER — PHENYTOIN SODIUM 100 MG/1
300 CAPSULE, EXTENDED RELEASE ORAL
Status: COMPLETED | OUTPATIENT
Start: 2017-09-28 | End: 2017-09-28

## 2017-09-28 RX ORDER — SODIUM CHLORIDE 0.9 % (FLUSH) 0.9 %
5-10 SYRINGE (ML) INJECTION AS NEEDED
Status: DISCONTINUED | OUTPATIENT
Start: 2017-09-28 | End: 2017-09-29 | Stop reason: HOSPADM

## 2017-09-28 RX ORDER — PHENYTOIN SODIUM 100 MG/1
100 CAPSULE, EXTENDED RELEASE ORAL 3 TIMES DAILY
COMMUNITY
End: 2017-10-13

## 2017-09-28 RX ORDER — SODIUM CHLORIDE 0.9 % (FLUSH) 0.9 %
5-10 SYRINGE (ML) INJECTION EVERY 8 HOURS
Status: DISCONTINUED | OUTPATIENT
Start: 2017-09-29 | End: 2017-09-29 | Stop reason: HOSPADM

## 2017-09-28 RX ORDER — LISINOPRIL 20 MG/1
20 TABLET ORAL DAILY
Status: DISCONTINUED | OUTPATIENT
Start: 2017-09-29 | End: 2017-09-29 | Stop reason: HOSPADM

## 2017-09-28 RX ORDER — CYCLOBENZAPRINE HCL 10 MG
10 TABLET ORAL
Status: DISCONTINUED | OUTPATIENT
Start: 2017-09-28 | End: 2017-09-29 | Stop reason: HOSPADM

## 2017-09-28 RX ORDER — LORAZEPAM 2 MG/ML
2 INJECTION INTRAMUSCULAR
Status: DISCONTINUED | OUTPATIENT
Start: 2017-09-28 | End: 2017-09-29 | Stop reason: HOSPADM

## 2017-09-28 RX ORDER — OXYCODONE HYDROCHLORIDE 5 MG/1
5 TABLET ORAL
Status: COMPLETED | OUTPATIENT
Start: 2017-09-28 | End: 2017-09-28

## 2017-09-28 RX ORDER — CLONAZEPAM 0.5 MG/1
1 TABLET ORAL 3 TIMES DAILY
Status: DISCONTINUED | OUTPATIENT
Start: 2017-09-29 | End: 2017-09-29 | Stop reason: HOSPADM

## 2017-09-28 RX ORDER — HYDROCODONE BITARTRATE AND ACETAMINOPHEN 5; 325 MG/1; MG/1
1 TABLET ORAL
Status: DISCONTINUED | OUTPATIENT
Start: 2017-09-28 | End: 2017-09-29

## 2017-09-28 RX ORDER — POTASSIUM CHLORIDE 750 MG/1
20 TABLET, FILM COATED, EXTENDED RELEASE ORAL
Status: COMPLETED | OUTPATIENT
Start: 2017-09-28 | End: 2017-09-28

## 2017-09-28 RX ORDER — LEVETIRACETAM 500 MG/1
1500 TABLET ORAL 2 TIMES DAILY
Status: DISCONTINUED | OUTPATIENT
Start: 2017-09-29 | End: 2017-09-29 | Stop reason: HOSPADM

## 2017-09-28 RX ORDER — ENOXAPARIN SODIUM 100 MG/ML
40 INJECTION SUBCUTANEOUS DAILY
Status: DISCONTINUED | OUTPATIENT
Start: 2017-09-29 | End: 2017-09-29 | Stop reason: HOSPADM

## 2017-09-28 RX ORDER — HYDROCHLOROTHIAZIDE 25 MG/1
25 TABLET ORAL DAILY
COMMUNITY

## 2017-09-28 RX ADMIN — PHENYTOIN SODIUM 300 MG: 100 CAPSULE ORAL at 22:26

## 2017-09-28 RX ADMIN — POTASSIUM CHLORIDE 20 MEQ: 750 TABLET, FILM COATED, EXTENDED RELEASE ORAL at 23:17

## 2017-09-28 RX ADMIN — LEVETIRACETAM 2000 MG: 100 INJECTION, SOLUTION, CONCENTRATE INTRAVENOUS at 22:27

## 2017-09-28 RX ADMIN — OXYCODONE HYDROCHLORIDE 5 MG: 5 TABLET ORAL at 22:26

## 2017-09-29 VITALS
HEIGHT: 69 IN | SYSTOLIC BLOOD PRESSURE: 135 MMHG | RESPIRATION RATE: 20 BRPM | TEMPERATURE: 97.7 F | OXYGEN SATURATION: 99 % | HEART RATE: 60 BPM | WEIGHT: 267.1 LBS | BODY MASS INDEX: 39.56 KG/M2 | DIASTOLIC BLOOD PRESSURE: 68 MMHG

## 2017-09-29 LAB
ALBUMIN SERPL-MCNC: 3.5 G/DL (ref 3.5–5)
ALBUMIN/GLOB SERPL: 1.3 {RATIO} (ref 1.1–2.2)
ALP SERPL-CCNC: 90 U/L (ref 45–117)
ALT SERPL-CCNC: 35 U/L (ref 12–78)
ANION GAP SERPL CALC-SCNC: 6 MMOL/L (ref 5–15)
AST SERPL-CCNC: 17 U/L (ref 15–37)
ATRIAL RATE: 83 BPM
BASOPHILS # BLD: 0 K/UL (ref 0–0.1)
BASOPHILS NFR BLD: 0 % (ref 0–1)
BILIRUB SERPL-MCNC: 0.2 MG/DL (ref 0.2–1)
BUN SERPL-MCNC: 13 MG/DL (ref 6–20)
BUN/CREAT SERPL: 15 (ref 12–20)
CALCIUM SERPL-MCNC: 8.5 MG/DL (ref 8.5–10.1)
CALCULATED P AXIS, ECG09: 36 DEGREES
CALCULATED R AXIS, ECG10: -11 DEGREES
CALCULATED T AXIS, ECG11: 6 DEGREES
CHLORIDE SERPL-SCNC: 108 MMOL/L (ref 97–108)
CO2 SERPL-SCNC: 26 MMOL/L (ref 21–32)
CREAT SERPL-MCNC: 0.86 MG/DL (ref 0.7–1.3)
DIAGNOSIS, 93000: NORMAL
EOSINOPHIL # BLD: 0.2 K/UL (ref 0–0.4)
EOSINOPHIL NFR BLD: 2 % (ref 0–7)
ERYTHROCYTE [DISTWIDTH] IN BLOOD BY AUTOMATED COUNT: 13.3 % (ref 11.5–14.5)
GLOBULIN SER CALC-MCNC: 2.7 G/DL (ref 2–4)
GLUCOSE SERPL-MCNC: 122 MG/DL (ref 65–100)
HCT VFR BLD AUTO: 39.4 % (ref 36.6–50.3)
HGB BLD-MCNC: 13.3 G/DL (ref 12.1–17)
LYMPHOCYTES # BLD: 2.9 K/UL (ref 0.8–3.5)
LYMPHOCYTES NFR BLD: 28 % (ref 12–49)
MAGNESIUM SERPL-MCNC: 2.2 MG/DL (ref 1.6–2.4)
MCH RBC QN AUTO: 29.4 PG (ref 26–34)
MCHC RBC AUTO-ENTMCNC: 33.8 G/DL (ref 30–36.5)
MCV RBC AUTO: 87.2 FL (ref 80–99)
MONOCYTES # BLD: 0.5 K/UL (ref 0–1)
MONOCYTES NFR BLD: 5 % (ref 5–13)
NEUTS SEG # BLD: 6.5 K/UL (ref 1.8–8)
NEUTS SEG NFR BLD: 65 % (ref 32–75)
P-R INTERVAL, ECG05: 152 MS
PHOSPHATE SERPL-MCNC: 4.3 MG/DL (ref 2.6–4.7)
PLATELET # BLD AUTO: 226 K/UL (ref 150–400)
POTASSIUM SERPL-SCNC: 3.7 MMOL/L (ref 3.5–5.1)
PROT SERPL-MCNC: 6.2 G/DL (ref 6.4–8.2)
Q-T INTERVAL, ECG07: 376 MS
QRS DURATION, ECG06: 98 MS
QTC CALCULATION (BEZET), ECG08: 441 MS
RBC # BLD AUTO: 4.52 M/UL (ref 4.1–5.7)
SODIUM SERPL-SCNC: 140 MMOL/L (ref 136–145)
VENTRICULAR RATE, ECG03: 83 BPM
WBC # BLD AUTO: 10.1 K/UL (ref 4.1–11.1)

## 2017-09-29 PROCEDURE — 85025 COMPLETE CBC W/AUTO DIFF WBC: CPT | Performed by: INTERNAL MEDICINE

## 2017-09-29 PROCEDURE — 83735 ASSAY OF MAGNESIUM: CPT | Performed by: INTERNAL MEDICINE

## 2017-09-29 PROCEDURE — 99218 HC RM OBSERVATION: CPT

## 2017-09-29 PROCEDURE — 84100 ASSAY OF PHOSPHORUS: CPT | Performed by: INTERNAL MEDICINE

## 2017-09-29 PROCEDURE — 80053 COMPREHEN METABOLIC PANEL: CPT | Performed by: INTERNAL MEDICINE

## 2017-09-29 PROCEDURE — 36415 COLL VENOUS BLD VENIPUNCTURE: CPT | Performed by: INTERNAL MEDICINE

## 2017-09-29 PROCEDURE — 74011250637 HC RX REV CODE- 250/637: Performed by: INTERNAL MEDICINE

## 2017-09-29 RX ORDER — OXYCODONE AND ACETAMINOPHEN 5; 325 MG/1; MG/1
1 TABLET ORAL
Status: DISCONTINUED | OUTPATIENT
Start: 2017-09-29 | End: 2017-09-29 | Stop reason: HOSPADM

## 2017-09-29 RX ADMIN — CYCLOBENZAPRINE HYDROCHLORIDE 10 MG: 10 TABLET, FILM COATED ORAL at 00:26

## 2017-09-29 RX ADMIN — CLONAZEPAM 1 MG: 0.5 TABLET ORAL at 00:26

## 2017-09-29 RX ADMIN — LEVETIRACETAM 1500 MG: 500 TABLET ORAL at 09:21

## 2017-09-29 RX ADMIN — LISINOPRIL 20 MG: 20 TABLET ORAL at 09:21

## 2017-09-29 RX ADMIN — OXYCODONE AND ACETAMINOPHEN 1 TABLET: 5; 325 TABLET ORAL at 09:21

## 2017-09-29 RX ADMIN — PHENYTOIN SODIUM 100 MG: 100 CAPSULE ORAL at 09:21

## 2017-09-29 RX ADMIN — Medication 10 ML: at 04:36

## 2017-09-29 RX ADMIN — Medication 10 ML: at 00:27

## 2017-09-29 RX ADMIN — CLONAZEPAM 1 MG: 0.5 TABLET ORAL at 09:21

## 2017-09-29 NOTE — ROUTINE PROCESS
TRANSFER - OUT REPORT:    Verbal report given to Cherri(name) on Hakan Hoyt  being transferred to [facility](unit) for routine progression of care       Report consisted of patients Situation, Background, Assessment and   Recommendations(SBAR). Information from the following report(s) ED Summary was reviewed with the receiving nurse. Lines:   Peripheral IV 09/28/17 Right Antecubital (Active)   Site Assessment Clean, dry, & intact 9/28/2017  8:35 PM   Phlebitis Assessment 0 9/28/2017  8:35 PM   Infiltration Assessment 0 9/28/2017  8:35 PM   Dressing Status Clean, dry, & intact 9/28/2017  8:35 PM   Dressing Type Transparent 9/28/2017  8:35 PM   Hub Color/Line Status Pink 9/28/2017  8:35 PM        Opportunity for questions and clarification was provided.       Patient transported with:   [employer]
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
## 2017-09-29 NOTE — PROGRESS NOTES
TRANSFER - IN REPORT:    Verbal report received from Kendall Izaguirre, [facility] (name) on [facility]  being received from ED (unit) for routine progression of care      Report consisted of patients Situation, Background, Assessment and   Recommendations(SBAR). Information from the following report(s) SBAR, Kardex, Intake/Output, MAR, Recent Results, Med Rec Status and Cardiac Rhythm NSR was reviewed with the receiving nurse. Opportunity for questions and clarification was provided. Assessment completed upon patients arrival to unit and care assumed.

## 2017-09-29 NOTE — PROGRESS NOTES
11:45 PM Patient arrived to room 3122 via stretcher. VS stable. Assessment complete. Patient with c/o left shoulder pain 9/10 and states, \"Norco doesn't work for me. Can you page the on call doctor and ask for something else? \" Dr. Opal King paged. Orders received for percocet. See MAR. Patient oriented to room and call bell system. Bed in lowest position. Call bell within reach. Will continue to monitor closely.     Primary Nurse Kemar Sewell RN and Zakiya Peralta RN performed a dual skin assessment on this patient No impairment noted  Dereck score is 21

## 2017-09-29 NOTE — PROGRESS NOTES
Hospitalist note    Got paged at 9:56am  To discharge patient who is impatient to leave. He was admitted to observation for Seizures due to non compliance  Informed nurse that I am in the midst of discharging another patient and will  Come as soon as I can. Called the floor at 10:20 am to clarify info about patient this patient as  I am starting  Discharge process only to be notified that he had left AMA at 10 :10  as he could not wait. Reviewed chart and old chart.   Patient has been provided with a PCP and and a Neurology consult      Davin Gutierrez MD

## 2017-09-29 NOTE — PROGRESS NOTES
Initial Nutrition Assessment:    INTERVENTIONS/RECOMMENDATIONS:   · Meals/Snacks: General/healthful diet: Continue Regular diet    ASSESSMENT:   Patient medically noted for seizure. PMH for TBI, HTN, depression, and Bipolar disorder. MST reporting unsure of recent weight loss and not eating poorly PTA. Chart review shows a stable weight x 3 months and 27# weight gain over the past year. Receiving a regular diet. No nutrition concerns at this time. Encourage intake of meals. Diet Order: Regular  % Eaten:  No data found. Pertinent Medications: [x]Reviewed []Other: Keppra, Lisinopril, Dilantin   Pertinent Labs: [x]Reviewed []Other:  Food Allergies: []None [x]Other: Iodine, Fish, Shellfish   Last BM: 9/28   []Active     []Hyperactive  []Hypoactive       [] Absent BS  Skin:    [x] Intact   [] Incision  [] Breakdown  [] Other:    Anthropometrics:   Height: 5' 9\" (175.3 cm) Weight: 121.2 kg (267 lb 1.6 oz)   IBW (%IBW): 72.6 kg (160 lb) ( ) UBW (%UBW):   (  %)   Last Weight Metrics:  Weight Loss Metrics 9/28/2017 9/26/2017 6/23/2017 12/23/2016 9/27/2016 9/5/2016 8/23/2016   Today's Wt 267 lb 1.6 oz 263 lb 14.3 oz 263 lb 14.3 oz 240 lb 240 lb 240 lb 213 lb   BMI 39.44 kg/m2 38.97 kg/m2 37.86 kg/m2 34.44 kg/m2 34.44 kg/m2 34.44 kg/m2 30.56 kg/m2   Some encounter information is confidential and restricted. Go to Review Flowsheets activity to see all data. BMI: Body mass index is 39.44 kg/(m^2). This BMI is indicative of:   []Underweight    []Normal    []Overweight    [x] Obesity   [] Extreme Obesity (BMI>40)     Estimated Nutrition Needs (Based on):   2977 Kcals/day (BMR (2155) x 1. 3AF -500kcal) , 88 g (1.2 g/kg IBW) Protein  Carbohydrate:  At Least 130 g/day  Fluids: 2300 mL/day (1ml/kcal)    Pt expected to meet estimated nutrient needs: [x]Yes []No    NUTRITION DIAGNOSES:   Problem:  No nutritional diagnosis at this time      Etiology: related to       Signs/Symptoms: as evidenced by        NUTRITION INTERVENTIONS:  Meals/Snacks: General/healthful diet                  GOAL:   PO intake >70% of meals next 5-7 days    LEARNING NEEDS (Diet, Food/Nutrient-Drug Interaction):    [x] None Identified   [] Identified and Education Provided/Documented   [] Identified and Pt declined/was not appropriate     Cultureal, Jew, OR Ethnic Dietary Needs:    [x] None Identified   [] Identified and Addressed     [x] Interdisciplinary Care Plan Reviewed/Documented    [x] Discharge Planning:  Regular diet     MONITORING /EVALUATION:   Behavioral-Environmental Outcomes: Behavior  Food/Nutrient Intake Outcomes:  Total energy intake  Physical Signs/Symptoms Outcomes: Weight/weight change    NUTRITION RISK:    [] High              [] Moderate           [x]  Low  []  Minimal/Uncompromised    PT SEEN FOR:    []  MD Consult: []Calorie Count      []Diabetic Diet Education        []Diet Education     []Electrolyte Management     []General Nutrition Management and Supplements     []Management of Tube Feeding     []TPN Recommendations    [x]  RN Referral:  [x]MST score >=2     []Enteral/Parenteral Nutrition PTA     []Pregnant: Gestational DM or Multigestation     []Pressure Ulcer/Wound Care needs        []  Low BMI  []  DTR Referral       Aditya Diamond  Pager [phone]                 Weekend Pager [phone]

## 2017-09-29 NOTE — PROGRESS NOTES
CM unable to do assessment, pt wanting to leave. CM briefly discussed with pt about paying for medications and pt verbalized he was able to afford his medications. Appointments made for a new PCP and Neurologist and informed pt of the dates and times. Pt's friend was driving pt home.      Jessica Bullock, [address]
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
## 2017-09-29 NOTE — PROGRESS NOTES
A new PCP f/u has been scheduled with Dr Isaías Zapata for Nov 16 at 11;00am    A Neuro f/u has been scheduled with Erasto Patterson NP for Nov 2 at 3;00pm

## 2017-09-29 NOTE — PROGRESS NOTES
Spoke with patient , he is wanting to leave now . Neurologist has seen and cleared. CM provided a list of new PCP and attempted to get appointments made. Pt stated will do himself. Pt signing [place] and leaving. Won't wait for MD to come and see patient and discharge.

## 2017-09-29 NOTE — ED NOTES
Hakan Hoyt is a 33 y.o. male who is brought to the ER via EMS for reported seizures at home. Patient reports he knew he had a seizure at home but wasn't sure when. Friends at bedside report he told them to lay him down and then he began to Anay all over\". They also report his eyes were closed; he had no vomiting; did not appear in any respiratory distress.

## 2017-10-09 ENCOUNTER — HOSPITAL ENCOUNTER (INPATIENT)
Age: 32
LOS: 1 days | Discharge: LEFT BEFORE DISCHARGE | DRG: 101 | End: 2017-10-10
Attending: EMERGENCY MEDICINE | Admitting: INTERNAL MEDICINE
Payer: MEDICARE

## 2017-10-09 ENCOUNTER — APPOINTMENT (OUTPATIENT)
Dept: CT IMAGING | Age: 32
DRG: 101 | End: 2017-10-09
Attending: EMERGENCY MEDICINE
Payer: MEDICARE

## 2017-10-09 DIAGNOSIS — M25.512 PAIN IN JOINT OF LEFT SHOULDER: ICD-10-CM

## 2017-10-09 DIAGNOSIS — G40.901 NONINTRACTABLE EPILEPSY WITH STATUS EPILEPTICUS, UNSPECIFIED EPILEPSY TYPE (HCC): Primary | ICD-10-CM

## 2017-10-09 PROBLEM — R56.9 SEIZURES (HCC): Status: ACTIVE | Noted: 2017-10-09

## 2017-10-09 PROBLEM — G40.909 EPILEPSY (HCC): Status: ACTIVE | Noted: 2017-10-09

## 2017-10-09 PROBLEM — Z91.14 NON COMPLIANCE W MEDICATION REGIMEN: Status: ACTIVE | Noted: 2017-10-09

## 2017-10-09 LAB
ALBUMIN SERPL-MCNC: 3.8 G/DL (ref 3.5–5)
ALBUMIN/GLOB SERPL: 1.2 {RATIO} (ref 1.1–2.2)
ALP SERPL-CCNC: 103 U/L (ref 45–117)
ALT SERPL-CCNC: 46 U/L (ref 12–78)
AMPHET UR QL SCN: NEGATIVE
ANION GAP SERPL CALC-SCNC: 9 MMOL/L (ref 5–15)
APPEARANCE UR: CLEAR
AST SERPL-CCNC: 23 U/L (ref 15–37)
BACTERIA URNS QL MICRO: NEGATIVE /HPF
BARBITURATES UR QL SCN: NEGATIVE
BASOPHILS # BLD: 0 K/UL (ref 0–0.1)
BASOPHILS NFR BLD: 0 % (ref 0–1)
BENZODIAZ UR QL: POSITIVE
BILIRUB SERPL-MCNC: 0.2 MG/DL (ref 0.2–1)
BILIRUB UR QL: NEGATIVE
BUN SERPL-MCNC: 9 MG/DL (ref 6–20)
BUN/CREAT SERPL: 10 (ref 12–20)
CALCIUM SERPL-MCNC: 8.8 MG/DL (ref 8.5–10.1)
CANNABINOIDS UR QL SCN: POSITIVE
CHLORIDE SERPL-SCNC: 104 MMOL/L (ref 97–108)
CO2 SERPL-SCNC: 24 MMOL/L (ref 21–32)
COCAINE UR QL SCN: NEGATIVE
COLOR UR: NORMAL
CREAT SERPL-MCNC: 0.86 MG/DL (ref 0.7–1.3)
DRUG SCRN COMMENT,DRGCM: ABNORMAL
EOSINOPHIL # BLD: 0.3 K/UL (ref 0–0.4)
EOSINOPHIL NFR BLD: 3 % (ref 0–7)
EPITH CASTS URNS QL MICRO: NORMAL /LPF
ERYTHROCYTE [DISTWIDTH] IN BLOOD BY AUTOMATED COUNT: 13.2 % (ref 11.5–14.5)
ETHANOL SERPL-MCNC: <10 MG/DL
GLOBULIN SER CALC-MCNC: 3.2 G/DL (ref 2–4)
GLUCOSE SERPL-MCNC: 139 MG/DL (ref 65–100)
GLUCOSE UR STRIP.AUTO-MCNC: NEGATIVE MG/DL
HCT VFR BLD AUTO: 42.4 % (ref 36.6–50.3)
HGB BLD-MCNC: 14.7 G/DL (ref 12.1–17)
HGB UR QL STRIP: NEGATIVE
HYALINE CASTS URNS QL MICRO: NORMAL /LPF (ref 0–5)
KETONES UR QL STRIP.AUTO: NEGATIVE MG/DL
LEUKOCYTE ESTERASE UR QL STRIP.AUTO: NEGATIVE
LYMPHOCYTES # BLD: 3.2 K/UL (ref 0.8–3.5)
LYMPHOCYTES NFR BLD: 40 % (ref 12–49)
MCH RBC QN AUTO: 30.4 PG (ref 26–34)
MCHC RBC AUTO-ENTMCNC: 34.7 G/DL (ref 30–36.5)
MCV RBC AUTO: 87.8 FL (ref 80–99)
METHADONE UR QL: NEGATIVE
MONOCYTES # BLD: 0.5 K/UL (ref 0–1)
MONOCYTES NFR BLD: 6 % (ref 5–13)
NEUTS SEG # BLD: 4.1 K/UL (ref 1.8–8)
NEUTS SEG NFR BLD: 51 % (ref 32–75)
NITRITE UR QL STRIP.AUTO: NEGATIVE
OPIATES UR QL: NEGATIVE
PCP UR QL: NEGATIVE
PH UR STRIP: 6 [PH] (ref 5–8)
PHENYTOIN SERPL-MCNC: 0.7 UG/ML (ref 10–20)
PLATELET # BLD AUTO: 199 K/UL (ref 150–400)
POTASSIUM SERPL-SCNC: 3.4 MMOL/L (ref 3.5–5.1)
PROT SERPL-MCNC: 7 G/DL (ref 6.4–8.2)
PROT UR STRIP-MCNC: NEGATIVE MG/DL
RBC # BLD AUTO: 4.83 M/UL (ref 4.1–5.7)
RBC #/AREA URNS HPF: NORMAL /HPF (ref 0–5)
SODIUM SERPL-SCNC: 137 MMOL/L (ref 136–145)
SP GR UR REFRACTOMETRY: 1.02 (ref 1–1.03)
UA: UC IF INDICATED,UAUC: NORMAL
UROBILINOGEN UR QL STRIP.AUTO: 0.2 EU/DL (ref 0.2–1)
WBC # BLD AUTO: 8.1 K/UL (ref 4.1–11.1)
WBC URNS QL MICRO: NORMAL /HPF (ref 0–4)

## 2017-10-09 PROCEDURE — 80307 DRUG TEST PRSMV CHEM ANLYZR: CPT | Performed by: EMERGENCY MEDICINE

## 2017-10-09 PROCEDURE — 85025 COMPLETE CBC W/AUTO DIFF WBC: CPT | Performed by: EMERGENCY MEDICINE

## 2017-10-09 PROCEDURE — 74011250636 HC RX REV CODE- 250/636: Performed by: EMERGENCY MEDICINE

## 2017-10-09 PROCEDURE — 96375 TX/PRO/DX INJ NEW DRUG ADDON: CPT

## 2017-10-09 PROCEDURE — 36415 COLL VENOUS BLD VENIPUNCTURE: CPT | Performed by: EMERGENCY MEDICINE

## 2017-10-09 PROCEDURE — 81001 URINALYSIS AUTO W/SCOPE: CPT | Performed by: EMERGENCY MEDICINE

## 2017-10-09 PROCEDURE — 70450 CT HEAD/BRAIN W/O DYE: CPT

## 2017-10-09 PROCEDURE — 80053 COMPREHEN METABOLIC PANEL: CPT | Performed by: EMERGENCY MEDICINE

## 2017-10-09 PROCEDURE — 99285 EMERGENCY DEPT VISIT HI MDM: CPT

## 2017-10-09 PROCEDURE — 96361 HYDRATE IV INFUSION ADD-ON: CPT

## 2017-10-09 PROCEDURE — 65660000000 HC RM CCU STEPDOWN

## 2017-10-09 PROCEDURE — 74011000258 HC RX REV CODE- 258: Performed by: EMERGENCY MEDICINE

## 2017-10-09 PROCEDURE — 96365 THER/PROPH/DIAG IV INF INIT: CPT

## 2017-10-09 PROCEDURE — 80185 ASSAY OF PHENYTOIN TOTAL: CPT | Performed by: EMERGENCY MEDICINE

## 2017-10-09 RX ORDER — KETOROLAC TROMETHAMINE 30 MG/ML
30 INJECTION, SOLUTION INTRAMUSCULAR; INTRAVENOUS
Status: COMPLETED | OUTPATIENT
Start: 2017-10-09 | End: 2017-10-10

## 2017-10-09 RX ORDER — LORAZEPAM 2 MG/ML
1 INJECTION INTRAMUSCULAR
Status: COMPLETED | OUTPATIENT
Start: 2017-10-09 | End: 2017-10-09

## 2017-10-09 RX ADMIN — LORAZEPAM 1 MG: 2 INJECTION INTRAMUSCULAR; INTRAVENOUS at 21:42

## 2017-10-09 RX ADMIN — LEVETIRACETAM 1000 MG: 100 INJECTION, SOLUTION, CONCENTRATE INTRAVENOUS at 22:30

## 2017-10-09 RX ADMIN — SODIUM CHLORIDE 1000 ML: 900 INJECTION, SOLUTION INTRAVENOUS at 21:41

## 2017-10-10 VITALS
WEIGHT: 264 LBS | RESPIRATION RATE: 15 BRPM | BODY MASS INDEX: 39.1 KG/M2 | DIASTOLIC BLOOD PRESSURE: 95 MMHG | OXYGEN SATURATION: 96 % | SYSTOLIC BLOOD PRESSURE: 142 MMHG | HEART RATE: 84 BPM | TEMPERATURE: 98.4 F | HEIGHT: 69 IN

## 2017-10-10 PROBLEM — S06.9XAA TBI (TRAUMATIC BRAIN INJURY): Chronic | Status: ACTIVE | Noted: 2017-09-28

## 2017-10-10 PROBLEM — M75.102 LEFT ROTATOR CUFF TEAR: Status: ACTIVE | Noted: 2017-10-10

## 2017-10-10 PROBLEM — G40.909 EPILEPSY (HCC): Chronic | Status: ACTIVE | Noted: 2017-10-09

## 2017-10-10 LAB
CK MB CFR SERPL CALC: 0.9 % (ref 0–2.5)
CK MB SERPL-MCNC: 1.1 NG/ML (ref 5–25)
CK SERPL-CCNC: 118 U/L (ref 39–308)
LACTATE SERPL-SCNC: 0.9 MMOL/L (ref 0.4–2)

## 2017-10-10 PROCEDURE — 74011250636 HC RX REV CODE- 250/636: Performed by: EMERGENCY MEDICINE

## 2017-10-10 PROCEDURE — 36415 COLL VENOUS BLD VENIPUNCTURE: CPT | Performed by: EMERGENCY MEDICINE

## 2017-10-10 PROCEDURE — 82550 ASSAY OF CK (CPK): CPT | Performed by: EMERGENCY MEDICINE

## 2017-10-10 PROCEDURE — 83605 ASSAY OF LACTIC ACID: CPT | Performed by: EMERGENCY MEDICINE

## 2017-10-10 PROCEDURE — 74011250636 HC RX REV CODE- 250/636: Performed by: INTERNAL MEDICINE

## 2017-10-10 PROCEDURE — 74011250637 HC RX REV CODE- 250/637: Performed by: INTERNAL MEDICINE

## 2017-10-10 RX ORDER — ONDANSETRON 2 MG/ML
4 INJECTION INTRAMUSCULAR; INTRAVENOUS
Status: DISCONTINUED | OUTPATIENT
Start: 2017-10-10 | End: 2017-10-10 | Stop reason: HOSPADM

## 2017-10-10 RX ORDER — SODIUM CHLORIDE 0.9 % (FLUSH) 0.9 %
5-10 SYRINGE (ML) INJECTION EVERY 8 HOURS
Status: DISCONTINUED | OUTPATIENT
Start: 2017-10-10 | End: 2017-10-10 | Stop reason: HOSPADM

## 2017-10-10 RX ORDER — NALOXONE HYDROCHLORIDE 0.4 MG/ML
0.4 INJECTION, SOLUTION INTRAMUSCULAR; INTRAVENOUS; SUBCUTANEOUS AS NEEDED
Status: DISCONTINUED | OUTPATIENT
Start: 2017-10-10 | End: 2017-10-10 | Stop reason: HOSPADM

## 2017-10-10 RX ORDER — DIPHENHYDRAMINE HYDROCHLORIDE 50 MG/ML
12.5 INJECTION, SOLUTION INTRAMUSCULAR; INTRAVENOUS
Status: DISCONTINUED | OUTPATIENT
Start: 2017-10-10 | End: 2017-10-10 | Stop reason: HOSPADM

## 2017-10-10 RX ORDER — SODIUM CHLORIDE 0.9 % (FLUSH) 0.9 %
5-10 SYRINGE (ML) INJECTION AS NEEDED
Status: DISCONTINUED | OUTPATIENT
Start: 2017-10-10 | End: 2017-10-10 | Stop reason: HOSPADM

## 2017-10-10 RX ORDER — MORPHINE SULFATE 2 MG/ML
1 INJECTION, SOLUTION INTRAMUSCULAR; INTRAVENOUS
Status: DISCONTINUED | OUTPATIENT
Start: 2017-10-10 | End: 2017-10-10 | Stop reason: HOSPADM

## 2017-10-10 RX ORDER — OXYCODONE AND ACETAMINOPHEN 5; 325 MG/1; MG/1
1 TABLET ORAL
Status: DISCONTINUED | OUTPATIENT
Start: 2017-10-10 | End: 2017-10-10 | Stop reason: HOSPADM

## 2017-10-10 RX ORDER — LORAZEPAM 2 MG/ML
1 INJECTION INTRAMUSCULAR
Status: DISCONTINUED | OUTPATIENT
Start: 2017-10-10 | End: 2017-10-10 | Stop reason: HOSPADM

## 2017-10-10 RX ORDER — CLONAZEPAM 1 MG/1
1 TABLET ORAL 3 TIMES DAILY
Status: DISCONTINUED | OUTPATIENT
Start: 2017-10-10 | End: 2017-10-10 | Stop reason: HOSPADM

## 2017-10-10 RX ORDER — LISINOPRIL 20 MG/1
20 TABLET ORAL DAILY
Status: DISCONTINUED | OUTPATIENT
Start: 2017-10-10 | End: 2017-10-10 | Stop reason: HOSPADM

## 2017-10-10 RX ORDER — POTASSIUM CHLORIDE 750 MG/1
20 TABLET, FILM COATED, EXTENDED RELEASE ORAL
Status: COMPLETED | OUTPATIENT
Start: 2017-10-10 | End: 2017-10-10

## 2017-10-10 RX ORDER — ENOXAPARIN SODIUM 100 MG/ML
40 INJECTION SUBCUTANEOUS EVERY 24 HOURS
Status: DISCONTINUED | OUTPATIENT
Start: 2017-10-10 | End: 2017-10-10 | Stop reason: HOSPADM

## 2017-10-10 RX ORDER — LAMOTRIGINE 100 MG/1
200 TABLET ORAL 2 TIMES DAILY
Status: DISCONTINUED | OUTPATIENT
Start: 2017-10-10 | End: 2017-10-10 | Stop reason: HOSPADM

## 2017-10-10 RX ORDER — PHENYTOIN SODIUM 100 MG/1
100 CAPSULE, EXTENDED RELEASE ORAL 3 TIMES DAILY
Status: DISCONTINUED | OUTPATIENT
Start: 2017-10-10 | End: 2017-10-10 | Stop reason: HOSPADM

## 2017-10-10 RX ORDER — HYDROMORPHONE HYDROCHLORIDE 1 MG/ML
1 INJECTION, SOLUTION INTRAMUSCULAR; INTRAVENOUS; SUBCUTANEOUS ONCE
Status: COMPLETED | OUTPATIENT
Start: 2017-10-10 | End: 2017-10-10

## 2017-10-10 RX ORDER — LEVETIRACETAM 500 MG/1
1500 TABLET ORAL 2 TIMES DAILY
Status: DISCONTINUED | OUTPATIENT
Start: 2017-10-10 | End: 2017-10-10 | Stop reason: HOSPADM

## 2017-10-10 RX ORDER — CYCLOBENZAPRINE HCL 10 MG
10 TABLET ORAL
Status: DISCONTINUED | OUTPATIENT
Start: 2017-10-10 | End: 2017-10-10

## 2017-10-10 RX ORDER — PRAVASTATIN SODIUM 10 MG/1
20 TABLET ORAL
Status: DISCONTINUED | OUTPATIENT
Start: 2017-10-10 | End: 2017-10-10 | Stop reason: HOSPADM

## 2017-10-10 RX ORDER — CYCLOBENZAPRINE HCL 10 MG
10 TABLET ORAL
Status: DISCONTINUED | OUTPATIENT
Start: 2017-10-10 | End: 2017-10-10 | Stop reason: HOSPADM

## 2017-10-10 RX ADMIN — KETOROLAC TROMETHAMINE 30 MG: 30 INJECTION, SOLUTION INTRAMUSCULAR at 00:00

## 2017-10-10 RX ADMIN — HYDROMORPHONE HYDROCHLORIDE 1 MG: 1 INJECTION, SOLUTION INTRAMUSCULAR; INTRAVENOUS; SUBCUTANEOUS at 02:00

## 2017-10-10 RX ADMIN — CYCLOBENZAPRINE HYDROCHLORIDE 10 MG: 10 TABLET, FILM COATED ORAL at 01:48

## 2017-10-10 RX ADMIN — PRAVASTATIN SODIUM 20 MG: 10 TABLET ORAL at 01:48

## 2017-10-10 RX ADMIN — MORPHINE SULFATE 1 MG: 2 INJECTION, SOLUTION INTRAMUSCULAR; INTRAVENOUS at 00:35

## 2017-10-10 RX ADMIN — POTASSIUM CHLORIDE 20 MEQ: 750 TABLET, FILM COATED, EXTENDED RELEASE ORAL at 01:48

## 2017-10-10 NOTE — DISCHARGE SUMMARY
Pt eloped from the floor room 3101 after being admitted by me in ER. Pt apparently got RN on floor to get Tele hospitalist to order 1 dose of Dilaudid before he pulled out his IV & removed his tele monitor & left with his friend. Pt demonstrated pain medication seeking behavior during this admission. Doubt pt had seizures now (chief complain) as pt's blood work now reveals normal  & lact= 0.9 despite the history of multiple Tonic clonic seizures pt presented with & 1 witnessed GTC seizure in ER.
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
## 2017-10-10 NOTE — ED NOTES
Dr Cabrear Common in to speak with patient, med for pain. Patient stated, \"That won't do anything. \"

## 2017-10-10 NOTE — ED NOTES
Patient complaining of chronic left shoulder pain, asking to speak to ERMD. Dr Artur De La Cruz notified, will speak with patient.

## 2017-10-10 NOTE — ED NOTES
Patient experienced a tonic clonic seizure last less than 10 seconds.  Dr Bassem Christine notified and at bedside, med with Ativan

## 2017-10-10 NOTE — ROUTINE PROCESS
.. TRANSFER - IN REPORT:    Verbal report received from [address]  on Patricia Clear  being received from The ED for routine progression of care      Report consisted of patients Situation, Background, Assessment and   Recommendations(SBAR). Information from the following report(s) SBAR and ED Summary was reviewed with the receiving nurse. Opportunity for questions and clarification was provided. Assessment completed upon patients arrival to unit and care assumed.

## 2017-10-12 ENCOUNTER — HOSPITAL ENCOUNTER (OUTPATIENT)
Age: 32
Setting detail: OBSERVATION
Discharge: LEFT BEFORE DISCHARGE | End: 2017-10-12
Attending: EMERGENCY MEDICINE | Admitting: INTERNAL MEDICINE
Payer: MEDICARE

## 2017-10-12 ENCOUNTER — HOSPITAL ENCOUNTER (EMERGENCY)
Age: 32
Discharge: HOME | End: 2017-10-13
Attending: EMERGENCY MEDICINE
Payer: MEDICARE

## 2017-10-12 VITALS
HEIGHT: 69 IN | SYSTOLIC BLOOD PRESSURE: 131 MMHG | HEART RATE: 83 BPM | WEIGHT: 263 LBS | RESPIRATION RATE: 17 BRPM | TEMPERATURE: 98 F | OXYGEN SATURATION: 98 % | BODY MASS INDEX: 38.95 KG/M2 | DIASTOLIC BLOOD PRESSURE: 64 MMHG

## 2017-10-12 DIAGNOSIS — R56.9 SEIZURE (HCC): Primary | ICD-10-CM

## 2017-10-12 DIAGNOSIS — G40.802 OTHER EPILEPSY WITHOUT STATUS EPILEPTICUS, NOT INTRACTABLE (HCC): Primary | ICD-10-CM

## 2017-10-12 LAB
ALBUMIN SERPL-MCNC: 4.3 G/DL (ref 3.5–5)
ALBUMIN/GLOB SERPL: 1.2 {RATIO} (ref 1.1–2.2)
ALP SERPL-CCNC: 109 U/L (ref 45–117)
ALT SERPL-CCNC: 55 U/L (ref 12–78)
AMPHET UR QL SCN: NEGATIVE
ANION GAP SERPL CALC-SCNC: 9 MMOL/L (ref 5–15)
APPEARANCE UR: CLEAR
AST SERPL-CCNC: 37 U/L (ref 15–37)
ATRIAL RATE: 67 BPM
BACTERIA URNS QL MICRO: NEGATIVE /HPF
BARBITURATES UR QL SCN: NEGATIVE
BASOPHILS # BLD: 0 K/UL (ref 0–0.1)
BASOPHILS NFR BLD: 0 % (ref 0–1)
BENZODIAZ UR QL: POSITIVE
BILIRUB SERPL-MCNC: 0.7 MG/DL (ref 0.2–1)
BILIRUB UR QL: NEGATIVE
BUN SERPL-MCNC: 19 MG/DL (ref 6–20)
BUN/CREAT SERPL: 23 (ref 12–20)
CALCIUM SERPL-MCNC: 9.4 MG/DL (ref 8.5–10.1)
CALCULATED P AXIS, ECG09: 33 DEGREES
CALCULATED R AXIS, ECG10: -3 DEGREES
CALCULATED T AXIS, ECG11: -5 DEGREES
CANNABINOIDS UR QL SCN: POSITIVE
CHLORIDE SERPL-SCNC: 107 MMOL/L (ref 97–108)
CK SERPL-CCNC: 715 U/L (ref 39–308)
CO2 SERPL-SCNC: 24 MMOL/L (ref 21–32)
COCAINE UR QL SCN: POSITIVE
COLOR UR: ABNORMAL
CREAT SERPL-MCNC: 0.82 MG/DL (ref 0.7–1.3)
DIAGNOSIS, 93000: NORMAL
DRUG SCRN COMMENT,DRGCM: ABNORMAL
EOSINOPHIL # BLD: 0.1 K/UL (ref 0–0.4)
EOSINOPHIL NFR BLD: 1 % (ref 0–7)
EPITH CASTS URNS QL MICRO: ABNORMAL /LPF
ERYTHROCYTE [DISTWIDTH] IN BLOOD BY AUTOMATED COUNT: 13.1 % (ref 11.5–14.5)
GLOBULIN SER CALC-MCNC: 3.5 G/DL (ref 2–4)
GLUCOSE SERPL-MCNC: 85 MG/DL (ref 65–100)
GLUCOSE UR STRIP.AUTO-MCNC: NEGATIVE MG/DL
HCT VFR BLD AUTO: 41.2 % (ref 36.6–50.3)
HGB BLD-MCNC: 14.3 G/DL (ref 12.1–17)
HGB UR QL STRIP: NEGATIVE
HYALINE CASTS URNS QL MICRO: ABNORMAL /LPF (ref 0–5)
KETONES UR QL STRIP.AUTO: 15 MG/DL
LEUKOCYTE ESTERASE UR QL STRIP.AUTO: NEGATIVE
LYMPHOCYTES # BLD: 2.8 K/UL (ref 0.8–3.5)
LYMPHOCYTES NFR BLD: 27 % (ref 12–49)
MCH RBC QN AUTO: 30.4 PG (ref 26–34)
MCHC RBC AUTO-ENTMCNC: 34.7 G/DL (ref 30–36.5)
MCV RBC AUTO: 87.7 FL (ref 80–99)
METHADONE UR QL: NEGATIVE
MONOCYTES # BLD: 0.8 K/UL (ref 0–1)
MONOCYTES NFR BLD: 7 % (ref 5–13)
NEUTS SEG # BLD: 6.7 K/UL (ref 1.8–8)
NEUTS SEG NFR BLD: 65 % (ref 32–75)
NITRITE UR QL STRIP.AUTO: NEGATIVE
OPIATES UR QL: NEGATIVE
P-R INTERVAL, ECG05: 170 MS
PCP UR QL: NEGATIVE
PH UR STRIP: 6 [PH] (ref 5–8)
PLATELET # BLD AUTO: 203 K/UL (ref 150–400)
POTASSIUM SERPL-SCNC: 3.7 MMOL/L (ref 3.5–5.1)
PROT SERPL-MCNC: 7.8 G/DL (ref 6.4–8.2)
PROT UR STRIP-MCNC: NEGATIVE MG/DL
Q-T INTERVAL, ECG07: 430 MS
QRS DURATION, ECG06: 106 MS
QTC CALCULATION (BEZET), ECG08: 454 MS
RBC # BLD AUTO: 4.7 M/UL (ref 4.1–5.7)
RBC #/AREA URNS HPF: ABNORMAL /HPF (ref 0–5)
SODIUM SERPL-SCNC: 140 MMOL/L (ref 136–145)
SP GR UR REFRACTOMETRY: 1.02 (ref 1–1.03)
UR CULT HOLD, URHOLD: NORMAL
UROBILINOGEN UR QL STRIP.AUTO: 1 EU/DL (ref 0.2–1)
VENTRICULAR RATE, ECG03: 67 BPM
WBC # BLD AUTO: 10.5 K/UL (ref 4.1–11.1)
WBC URNS QL MICRO: ABNORMAL /HPF (ref 0–4)

## 2017-10-12 PROCEDURE — 96361 HYDRATE IV INFUSION ADD-ON: CPT

## 2017-10-12 PROCEDURE — 96375 TX/PRO/DX INJ NEW DRUG ADDON: CPT

## 2017-10-12 PROCEDURE — 74011250636 HC RX REV CODE- 250/636: Performed by: INTERNAL MEDICINE

## 2017-10-12 PROCEDURE — 36415 COLL VENOUS BLD VENIPUNCTURE: CPT | Performed by: EMERGENCY MEDICINE

## 2017-10-12 PROCEDURE — 96365 THER/PROPH/DIAG IV INF INIT: CPT

## 2017-10-12 PROCEDURE — 96374 THER/PROPH/DIAG INJ IV PUSH: CPT

## 2017-10-12 PROCEDURE — 99218 HC RM OBSERVATION: CPT

## 2017-10-12 PROCEDURE — 80307 DRUG TEST PRSMV CHEM ANLYZR: CPT | Performed by: EMERGENCY MEDICINE

## 2017-10-12 PROCEDURE — 74011000258 HC RX REV CODE- 258: Performed by: INTERNAL MEDICINE

## 2017-10-12 PROCEDURE — 80175 DRUG SCREEN QUAN LAMOTRIGINE: CPT | Performed by: EMERGENCY MEDICINE

## 2017-10-12 PROCEDURE — 80177 DRUG SCRN QUAN LEVETIRACETAM: CPT | Performed by: EMERGENCY MEDICINE

## 2017-10-12 PROCEDURE — 99285 EMERGENCY DEPT VISIT HI MDM: CPT

## 2017-10-12 PROCEDURE — 85025 COMPLETE CBC W/AUTO DIFF WBC: CPT | Performed by: EMERGENCY MEDICINE

## 2017-10-12 PROCEDURE — 96360 HYDRATION IV INFUSION INIT: CPT

## 2017-10-12 PROCEDURE — 74011250637 HC RX REV CODE- 250/637: Performed by: INTERNAL MEDICINE

## 2017-10-12 PROCEDURE — 74011250636 HC RX REV CODE- 250/636: Performed by: EMERGENCY MEDICINE

## 2017-10-12 PROCEDURE — 80053 COMPREHEN METABOLIC PANEL: CPT | Performed by: EMERGENCY MEDICINE

## 2017-10-12 PROCEDURE — 82550 ASSAY OF CK (CPK): CPT | Performed by: EMERGENCY MEDICINE

## 2017-10-12 PROCEDURE — 93005 ELECTROCARDIOGRAM TRACING: CPT

## 2017-10-12 PROCEDURE — 81001 URINALYSIS AUTO W/SCOPE: CPT | Performed by: EMERGENCY MEDICINE

## 2017-10-12 PROCEDURE — 99284 EMERGENCY DEPT VISIT MOD MDM: CPT

## 2017-10-12 RX ORDER — HYDROCODONE BITARTRATE AND ACETAMINOPHEN 5; 325 MG/1; MG/1
1 TABLET ORAL
Status: DISCONTINUED | OUTPATIENT
Start: 2017-10-12 | End: 2017-10-12 | Stop reason: HOSPADM

## 2017-10-12 RX ORDER — NALOXONE HYDROCHLORIDE 0.4 MG/ML
0.4 INJECTION, SOLUTION INTRAMUSCULAR; INTRAVENOUS; SUBCUTANEOUS AS NEEDED
Status: DISCONTINUED | OUTPATIENT
Start: 2017-10-12 | End: 2017-10-12 | Stop reason: HOSPADM

## 2017-10-12 RX ORDER — CLONAZEPAM 1 MG/1
1 TABLET ORAL 3 TIMES DAILY
Status: DISCONTINUED | OUTPATIENT
Start: 2017-10-12 | End: 2017-10-12 | Stop reason: HOSPADM

## 2017-10-12 RX ORDER — LEVETIRACETAM 10 MG/ML
1000 INJECTION INTRAVASCULAR EVERY 12 HOURS
Status: DISCONTINUED | OUTPATIENT
Start: 2017-10-12 | End: 2017-10-12

## 2017-10-12 RX ORDER — SODIUM CHLORIDE 0.9 % (FLUSH) 0.9 %
5-10 SYRINGE (ML) INJECTION EVERY 8 HOURS
Status: DISCONTINUED | OUTPATIENT
Start: 2017-10-12 | End: 2017-10-12 | Stop reason: HOSPADM

## 2017-10-12 RX ORDER — ENOXAPARIN SODIUM 100 MG/ML
40 INJECTION SUBCUTANEOUS EVERY 24 HOURS
Status: DISCONTINUED | OUTPATIENT
Start: 2017-10-12 | End: 2017-10-12 | Stop reason: HOSPADM

## 2017-10-12 RX ORDER — ACETAMINOPHEN 325 MG/1
650 TABLET ORAL
Status: DISCONTINUED | OUTPATIENT
Start: 2017-10-12 | End: 2017-10-12 | Stop reason: HOSPADM

## 2017-10-12 RX ORDER — LAMOTRIGINE 100 MG/1
200 TABLET ORAL 2 TIMES DAILY
Status: DISCONTINUED | OUTPATIENT
Start: 2017-10-12 | End: 2017-10-12 | Stop reason: HOSPADM

## 2017-10-12 RX ORDER — DIPHENHYDRAMINE HYDROCHLORIDE 50 MG/ML
12.5 INJECTION, SOLUTION INTRAMUSCULAR; INTRAVENOUS
Status: DISCONTINUED | OUTPATIENT
Start: 2017-10-12 | End: 2017-10-12 | Stop reason: HOSPADM

## 2017-10-12 RX ORDER — LORAZEPAM 2 MG/ML
2 INJECTION INTRAMUSCULAR
Status: DISCONTINUED | OUTPATIENT
Start: 2017-10-12 | End: 2017-10-12 | Stop reason: HOSPADM

## 2017-10-12 RX ORDER — SODIUM CHLORIDE 0.9 % (FLUSH) 0.9 %
5-10 SYRINGE (ML) INJECTION AS NEEDED
Status: DISCONTINUED | OUTPATIENT
Start: 2017-10-12 | End: 2017-10-12 | Stop reason: HOSPADM

## 2017-10-12 RX ORDER — DEXTROSE, SODIUM CHLORIDE, AND POTASSIUM CHLORIDE 5; .45; .15 G/100ML; G/100ML; G/100ML
100 INJECTION INTRAVENOUS CONTINUOUS
Status: DISCONTINUED | OUTPATIENT
Start: 2017-10-12 | End: 2017-10-12 | Stop reason: HOSPADM

## 2017-10-12 RX ORDER — ONDANSETRON 2 MG/ML
4 INJECTION INTRAMUSCULAR; INTRAVENOUS
Status: DISCONTINUED | OUTPATIENT
Start: 2017-10-12 | End: 2017-10-12 | Stop reason: HOSPADM

## 2017-10-12 RX ORDER — PHENYTOIN SODIUM 100 MG/1
100 CAPSULE, EXTENDED RELEASE ORAL 3 TIMES DAILY
Status: DISCONTINUED | OUTPATIENT
Start: 2017-10-12 | End: 2017-10-12 | Stop reason: HOSPADM

## 2017-10-12 RX ADMIN — SODIUM CHLORIDE 1000 ML: 900 INJECTION, SOLUTION INTRAVENOUS at 09:43

## 2017-10-12 RX ADMIN — CLONAZEPAM 1 MG: 1 TABLET ORAL at 16:18

## 2017-10-12 RX ADMIN — DEXTROSE MONOHYDRATE, SODIUM CHLORIDE, AND POTASSIUM CHLORIDE 100 ML/HR: 50; 4.5; 1.49 INJECTION, SOLUTION INTRAVENOUS at 16:19

## 2017-10-12 RX ADMIN — LEVETIRACETAM 1000 MG: 100 INJECTION, SOLUTION, CONCENTRATE INTRAVENOUS at 17:40

## 2017-10-12 RX ADMIN — HYDROCODONE BITARTRATE AND ACETAMINOPHEN 1 TABLET: 5; 325 TABLET ORAL at 16:21

## 2017-10-12 RX ADMIN — PHENYTOIN SODIUM 100 MG: 100 CAPSULE ORAL at 16:21

## 2017-10-12 RX ADMIN — Medication 10 ML: at 16:22

## 2017-10-12 RX ADMIN — LAMOTRIGINE 200 MG: 100 TABLET ORAL at 16:20

## 2017-10-12 NOTE — H&P
[address]   [address] [place] [address]   HISTORY AND PHYSICAL       Name:  Kristyn Younger   MR#:  [id number]   :  1985   Account #:  [de-identified]        Date of Adm:  10/12/2017       CHIEF COMPLAINT: Seizure disorder. Information taken from old charts and old admission. The patient was   sedated, unable to provide any information to me. HISTORY OF PRESENT ILLNESS: This is a 27-year-old patient   known from the service of internal medicine due to multiple   admissions. In , the patient was admitted at least 8 times for   different episode of seizures. This time, the patient was admitted for seizures. This   patient, who has had a lobectomy, was brought in a private vehicle   because he had seizures described as 17 seizures. There is no other   Characteristic of seizure or how long they last or any other associated with postictal   effect    ALLERGIES:   1. IODINE. 2. FISH-CONTAINING ELEMENT. PAST MEDICAL HISTORY: The patient has a traumatic brain injury,   anxiety disorder, bipolar disorder, hyperlipidemia, hypertension,   seizure disorder, substance abuse, as well as obesity. MEDICATIONS THAT THE PATIENT IS TAKING OR PRESCRIBED:    1. Dilantin ER 1000 mg 3 times a day. 2. Hydrochlorothiazide 25 mg daily. 3. Hydrocodone plus acetaminophen as needed for pain. 4. Flexeril 10 mg daily. 5. Lamictal 200 mg 2 times a day. 6. Keppra 750 mg twice a day. 7. Klonopin 1 mg 3 times a day. 8. Mevacor 200 mg daily. 9. Lisinopril 20 mg daily. SURGICAL HISTORY: Lobectomy, as well as sinus surgery. TOXIC HABITS: Positive for smoking. FAMILY HISTORY: Positive for depression and some psychiatric   disorder in some family members. PHYSICAL EXAMINATION:   VITAL SIGNS: Blood pressure is 146/82, heart rate 68, respirations 24,   temperature is 97.7.    GENERAL APPEARANCE: This is a 33year-old, did not respond to   verbal or tactile stimuli, in no acute distress. HEENT: There is no deformity. Moist oral mucosa. NECK: Supple, no JVD, no bruits. Thorax: Symmetrical expansion   without deformity. HEART: Regular rhythm. S1, S2 present. LUNGS: Clear to auscultation. ABDOMEN: Soft. Bowel sounds are present. There is no   organomegaly. LIMBS: Symmetric, no edema. NEUROLOGIC: Not done. LABORATORY DATA: WBC count is 10, hemoglobin and hematocrit is   14 and 41 with platelet count of 415. Sodium is 140, potassium 3.7,   chloride 107, CO2 is 24, creatinine is 8.82, BUN is 19, and glucose is   95. Liver functions are within normal limits. IMAGING STUDIES: None done. ASSESSMENT AND PLAN:   1. Seizure disorder. This patient in the past, compliance has been in   question, so I will assume that compliance could be what is   responsible for this the patient's frequent seizures. We will continue his   home medication and we will switch his Keppra from p.o. to IV. 2. Bipolar disorder. with this patient's ability to be compliant with the   medication. As such, we will get a consult from Psychiatry to help in   monitoring this patient from the bipolar standpoint of view. 3. Hypertension is stable with the current medication. His blood   pressure  146/82 and no further management with the blood   pressure. 4. Hyperlipidemia is stable with Mevacor. No other changes will be   done.         MD DEZ Carr / [facility]   D:  10/12/2017   14:01   T:  10/12/2017   14:39   Job #:  [id number]
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
## 2017-10-12 NOTE — PROGRESS NOTES
Primary Nurse Mal Burdick RN and Betina Yoon RN performed a dual skin assessment on this patient No impairment noted  Dereck score is 23

## 2017-10-12 NOTE — ED TRIAGE NOTES
Patient comes to the ER c/o 17 witnessed seizures in the past 3 days. Patient states that he has had multiple seizures this morning but unknown of exact number. Patient states he is on multiple seizure medications and takes them regularly.

## 2017-10-12 NOTE — Clinical Note
You need to fill the prescriptions for your seizure medications and take as directed. It's important you follow up with the neurologist as planned.

## 2017-10-12 NOTE — ED NOTES
Pt asked on 3 different occasions for a urine specimen and he stated he did not want to be catheterized and could not void at this time.

## 2017-10-12 NOTE — PROGRESS NOTES
TRANSFER - OUT REPORT:    Verbal report given to Lesli Carpenter RN(name) on Rajat Abreu  being transferred to [employer] (unit) for routine progression of care       Report consisted of patients Situation, Background, Assessment and   Recommendations(SBAR). Information from the following report(s) SBAR was reviewed with the receiving nurse. Lines:   Peripheral IV 10/12/17 Right Antecubital (Active)   Site Assessment Clean, dry, & intact 10/12/2017  9:42 AM   Phlebitis Assessment 0 10/12/2017  9:42 AM   Infiltration Assessment 0 10/12/2017  9:42 AM   Dressing Status Clean, dry, & intact 10/12/2017  9:42 AM   Dressing Type Transparent 10/12/2017  9:42 AM   Hub Color/Line Status Green;Flushed;Patent 10/12/2017  9:42 AM        Opportunity for questions and clarification was provided.       Patient transported with:   [employer]

## 2017-10-12 NOTE — PROGRESS NOTES
Bedside shift change report given to Guerda Christine  (oncoming nurse) by Henry Dao RN  (offgoing nurse). Report included the following information SBAR and MAR.

## 2017-10-12 NOTE — PROGRESS NOTES
Chart reviewed, noted patient readmitted tot he ER (left AMA on 10/10/2017). Patient is here this admission with complaints of seizures, stating he has had as many as 17 in 3 days. Patient has had 3 admissions in the past 6 months. During his last admission, appointments were made for him for a new PCP as well as a neurologist.    Patients' address is listed as [address] [place] [address]. Cm attempted to meet with patient but he was snoring, sleeping heavily. Will try later as able.   [employer] , [place]

## 2017-10-13 VITALS
SYSTOLIC BLOOD PRESSURE: 159 MMHG | RESPIRATION RATE: 18 BRPM | BODY MASS INDEX: 38.95 KG/M2 | WEIGHT: 263 LBS | TEMPERATURE: 98.1 F | HEART RATE: 87 BPM | OXYGEN SATURATION: 98 % | DIASTOLIC BLOOD PRESSURE: 84 MMHG | HEIGHT: 69 IN

## 2017-10-13 LAB
ALBUMIN SERPL-MCNC: 3.6 G/DL (ref 3.5–5)
ALBUMIN/GLOB SERPL: 1.1 {RATIO} (ref 1.1–2.2)
ALP SERPL-CCNC: 93 U/L (ref 45–117)
ALT SERPL-CCNC: 49 U/L (ref 12–78)
ANION GAP SERPL CALC-SCNC: 6 MMOL/L (ref 5–15)
AST SERPL-CCNC: 43 U/L (ref 15–37)
BASOPHILS # BLD: 0.1 K/UL (ref 0–0.1)
BASOPHILS NFR BLD: 1 % (ref 0–1)
BILIRUB SERPL-MCNC: 0.4 MG/DL (ref 0.2–1)
BUN SERPL-MCNC: 17 MG/DL (ref 6–20)
BUN/CREAT SERPL: 22 (ref 12–20)
CALCIUM SERPL-MCNC: 9 MG/DL (ref 8.5–10.1)
CHLORIDE SERPL-SCNC: 106 MMOL/L (ref 97–108)
CO2 SERPL-SCNC: 27 MMOL/L (ref 21–32)
CREAT SERPL-MCNC: 0.77 MG/DL (ref 0.7–1.3)
DIFFERENTIAL METHOD BLD: NORMAL
EOSINOPHIL # BLD: 0.3 K/UL (ref 0–0.4)
EOSINOPHIL NFR BLD: 3 % (ref 0–7)
ERYTHROCYTE [DISTWIDTH] IN BLOOD BY AUTOMATED COUNT: 13.3 % (ref 11.5–14.5)
GLOBULIN SER CALC-MCNC: 3.3 G/DL (ref 2–4)
GLUCOSE SERPL-MCNC: 102 MG/DL (ref 65–100)
HCT VFR BLD AUTO: 40.3 % (ref 36.6–50.3)
HGB BLD-MCNC: 13.9 G/DL (ref 12.1–17)
LEVETIRACETAM SERPL-MCNC: 2.5 UG/ML (ref 10–40)
LYMPHOCYTES # BLD: 2.5 K/UL (ref 0.8–3.5)
LYMPHOCYTES NFR BLD: 29 % (ref 12–49)
MCH RBC QN AUTO: 30.3 PG (ref 26–34)
MCHC RBC AUTO-ENTMCNC: 34.5 G/DL (ref 30–36.5)
MCV RBC AUTO: 88 FL (ref 80–99)
MONOCYTES # BLD: 0.5 K/UL (ref 0–1)
MONOCYTES NFR BLD: 6 % (ref 5–13)
NEUTS SEG # BLD: 5.3 K/UL (ref 1.8–8)
NEUTS SEG NFR BLD: 61 % (ref 32–75)
PHENYTOIN SERPL-MCNC: <0.5 UG/ML (ref 10–20)
PLATELET # BLD AUTO: 190 K/UL (ref 150–400)
POTASSIUM SERPL-SCNC: 4.1 MMOL/L (ref 3.5–5.1)
PROT SERPL-MCNC: 6.9 G/DL (ref 6.4–8.2)
RBC # BLD AUTO: 4.58 M/UL (ref 4.1–5.7)
RBC MORPH BLD: NORMAL
SODIUM SERPL-SCNC: 139 MMOL/L (ref 136–145)
WBC # BLD AUTO: 8.7 K/UL (ref 4.1–11.1)

## 2017-10-13 PROCEDURE — 85025 COMPLETE CBC W/AUTO DIFF WBC: CPT | Performed by: EMERGENCY MEDICINE

## 2017-10-13 PROCEDURE — 80185 ASSAY OF PHENYTOIN TOTAL: CPT | Performed by: EMERGENCY MEDICINE

## 2017-10-13 PROCEDURE — 96365 THER/PROPH/DIAG IV INF INIT: CPT

## 2017-10-13 PROCEDURE — 96375 TX/PRO/DX INJ NEW DRUG ADDON: CPT

## 2017-10-13 PROCEDURE — 96361 HYDRATE IV INFUSION ADD-ON: CPT

## 2017-10-13 PROCEDURE — 74011000258 HC RX REV CODE- 258: Performed by: EMERGENCY MEDICINE

## 2017-10-13 PROCEDURE — 80053 COMPREHEN METABOLIC PANEL: CPT | Performed by: EMERGENCY MEDICINE

## 2017-10-13 PROCEDURE — 36415 COLL VENOUS BLD VENIPUNCTURE: CPT | Performed by: EMERGENCY MEDICINE

## 2017-10-13 PROCEDURE — 74011250636 HC RX REV CODE- 250/636: Performed by: EMERGENCY MEDICINE

## 2017-10-13 RX ORDER — DIPHENHYDRAMINE HYDROCHLORIDE 50 MG/ML
50 INJECTION, SOLUTION INTRAMUSCULAR; INTRAVENOUS
Status: COMPLETED | OUTPATIENT
Start: 2017-10-13 | End: 2017-10-13

## 2017-10-13 RX ORDER — FOSPHENYTOIN SODIUM 50 MG/ML
1500 INJECTION, SOLUTION INTRAMUSCULAR; INTRAVENOUS
Status: DISCONTINUED | OUTPATIENT
Start: 2017-10-13 | End: 2017-10-13

## 2017-10-13 RX ORDER — PHENYTOIN SODIUM 100 MG/1
100 CAPSULE, EXTENDED RELEASE ORAL 3 TIMES DAILY
Qty: 30 CAP | Refills: 0 | Status: SHIPPED | OUTPATIENT
Start: 2017-10-13 | End: 2018-07-26

## 2017-10-13 RX ADMIN — DIPHENHYDRAMINE HYDROCHLORIDE 50 MG: 50 INJECTION, SOLUTION INTRAMUSCULAR; INTRAVENOUS at 02:52

## 2017-10-13 RX ADMIN — SODIUM CHLORIDE 1000 ML: 900 INJECTION, SOLUTION INTRAVENOUS at 00:58

## 2017-10-13 RX ADMIN — SODIUM CHLORIDE 1500 MG PE: 900 INJECTION, SOLUTION INTRAVENOUS at 02:21

## 2017-10-13 NOTE — ED TRIAGE NOTES
Patient has had 17 seizures in 3 days and thinks that he may have another one now. Was just admitted at [facility] for the same and left AMA due to mis treatment by the nurses.
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
## 2017-10-13 NOTE — PROGRESS NOTES
Entered pt's room on rounds and observed iv pump facing pt with rate changed to 999, after asking pt had he touched the pump he denied tampering with it but stated he was familiar with the equipment from a previous hospitalization. IV rate to corrected to ordered rate and the pump locked. Charge nurse notified.

## 2017-10-13 NOTE — ACP (ADVANCE CARE PLANNING)
Request by this pt to assist with advance medical directive in [facility]. Consulted with patients nurse, Willie Candelaria on pt's capacity. RN confirmed pt was capable of AMD completion. Explained document to patient as requested. Pt was insistent on completing document personally and solely. Made copy for patient and placed a copy in pt's physical chart.      Dennys Almodovar MDiv.  Staff   Please call [phone] to page  if needed

## 2017-10-13 NOTE — ED NOTES
Charge Nurse in room talking to pt; security standing outside room and nursing supervisor informed of situation by charge nurse.
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
## 2017-10-13 NOTE — PROGRESS NOTES
Pt left against medical advice and refused to sign Informed Refusal form,removed saline lock and walked off unit with family member. Dr. Paul Martell and supervisor notified.

## 2017-10-13 NOTE — ED NOTES
Pt given discharge paperwork by charge nurse because pt refused to see doctor; nursing supervisor went in with charge nurse as well.

## 2017-10-13 NOTE — DISCHARGE INSTRUCTIONS
Epilepsy: Care Instructions  Your Care Instructions  Epilepsy is a common condition that causes repeated seizures. The seizures are caused by bursts of electrical activity in the brain that aren't normal. Seizures may cause problems with muscle control, movement, speech, vision, or awareness. They can be scary. Epilepsy affects each person differently. Some people have only a few seizures. Others get them more often. If you know what triggers a seizure, you may be able to avoid having one. You can take medicines to control and reduce seizures. You and your doctor will need to find the right combination, schedule, and dose of medicine. This may take time and careful changes. Seizures may get worse and happen more often over time. Follow-up care is a key part of your treatment and safety. Be sure to make and go to all appointments, and call your doctor if you are having problems. It's also a good idea to know your test results and keep a list of the medicines you take. How can you care for yourself at home? · Be safe with medicines. Take your medicines exactly as prescribed. Call your doctor if you think you are having a problem with your medicine. · Make a treatment plan with your doctor. Be sure to follow your plan. · Try to identify and avoid things that may make you more likely to have a seizure. These may include:  ¨ Not getting enough sleep. ¨ Using drugs or alcohol. ¨ Being emotionally stressed. ¨ Skipping meals. · Keep a record of any seizures you have. Note the date, time of day, and any details about the seizure that you can remember. Your doctor can use this information to plan or adjust your medicine or other treatment. · Be sure that any doctor treating you for another condition knows that you have epilepsy. Each doctor should know what medicines you are taking, if any. · Wear a medical ID bracelet. You can buy this at most [employer].  If you have a seizure that leaves you unconscious or unable to speak for yourself, this bracelet will let those who are treating you know that you have epilepsy. · Talk to your doctor about whether it is safe for you to do certain activities, such as drive or swim. When should you call for help? Call 911 anytime you think you may need emergency care. For example, call if:  · A seizure does not stop as it normally does. · You have new symptoms such as:  ¨ Numbness, tingling, or weakness on one side of your body or face. ¨ Vision changes. ¨ Trouble speaking or thinking clearly. Call your doctor now or seek immediate medical care if:  · You have a fever. · You have a severe headache. Watch closely for changes in your health, and be sure to contact your doctor if:  · The normal pattern or features of your seizures change. Where can you learn more? Go to http://geeta-dana.info/. Paula Karimi in the search box to learn more about \"Epilepsy: Care Instructions. \"  Current as of: October 14, 2016  Content Version: 11.3  © 2306-3037 [employer]. Care instructions adapted under license by [employer] (which disclaims liability or warranty for this information). If you have questions about a medical condition or this instruction, always ask your healthcare professional. [address] any warranty or liability for your use of this information.

## 2017-10-13 NOTE — PROGRESS NOTES
I returned call from patient who reports that patient threatened to leave hospital (against medical advice). Patient has documented history of non-compliance. While on phone with nurse, she notes that patient refused to stay in the hospital and refused to sign AMA form. Nurse notes that patient left the hospital AMA.

## 2017-10-13 NOTE — ED NOTES
Pt agitated in room when doctor Heaven Justice tries to ask patient about previous admissions and leaving AMA from different hospitals. Pt getting agitated when Dr Heaven Justice reads and tries to explains other doctors notes about seeing patient, pt denies every being seen or admitted to hospitals. Dr Heaven Justice kept calm and explained to pt that just trying to understand to provide pt care. Pt demanding physician name and is agitated, RN tries to calm pt and asks if pt would like to speak to charge nurse, pt gets agitated and states \"no\". Pt claiming the doctor is \"calling me a liar\" and not believing him, MD repeats multiple times can only go off information from the chart.

## 2017-10-14 LAB — LAMOTRIGINE SERPL-MCNC: NORMAL UG/ML (ref 2–20)

## 2017-10-26 ENCOUNTER — HOSPITAL ENCOUNTER (EMERGENCY)
Age: 32
Discharge: HOME | End: 2017-10-26
Attending: EMERGENCY MEDICINE
Payer: MEDICARE

## 2017-10-26 VITALS
BODY MASS INDEX: 38.24 KG/M2 | OXYGEN SATURATION: 98 % | DIASTOLIC BLOOD PRESSURE: 99 MMHG | WEIGHT: 258.16 LBS | HEIGHT: 69 IN | SYSTOLIC BLOOD PRESSURE: 163 MMHG | RESPIRATION RATE: 16 BRPM | TEMPERATURE: 98.9 F | HEART RATE: 88 BPM

## 2017-10-26 DIAGNOSIS — R03.0 ELEVATED BLOOD PRESSURE READING: ICD-10-CM

## 2017-10-26 DIAGNOSIS — J02.9 ACUTE PHARYNGITIS, UNSPECIFIED ETIOLOGY: Primary | ICD-10-CM

## 2017-10-26 LAB — DEPRECATED S PYO AG THROAT QL EIA: NEGATIVE

## 2017-10-26 PROCEDURE — 87070 CULTURE OTHR SPECIMN AEROBIC: CPT | Performed by: EMERGENCY MEDICINE

## 2017-10-26 PROCEDURE — 87880 STREP A ASSAY W/OPTIC: CPT | Performed by: PHYSICIAN ASSISTANT

## 2017-10-26 PROCEDURE — 74011250637 HC RX REV CODE- 250/637: Performed by: PHYSICIAN ASSISTANT

## 2017-10-26 PROCEDURE — 99283 EMERGENCY DEPT VISIT LOW MDM: CPT

## 2017-10-26 RX ORDER — PREDNISONE 5 MG/1
TABLET ORAL
Qty: 21 TAB | Refills: 0 | Status: SHIPPED | OUTPATIENT
Start: 2017-10-26 | End: 2018-07-26

## 2017-10-26 RX ORDER — NAPROXEN 500 MG/1
500 TABLET ORAL
Qty: 20 TAB | Refills: 0 | Status: SHIPPED | OUTPATIENT
Start: 2017-10-26 | End: 2018-07-26

## 2017-10-26 RX ORDER — HYDROCODONE BITARTRATE AND ACETAMINOPHEN 5; 325 MG/1; MG/1
1 TABLET ORAL
Qty: 12 TAB | Refills: 0 | Status: SHIPPED | OUTPATIENT
Start: 2017-10-26

## 2017-10-26 RX ORDER — DEXAMETHASONE SODIUM PHOSPHATE 4 MG/ML
10 INJECTION, SOLUTION INTRA-ARTICULAR; INTRALESIONAL; INTRAMUSCULAR; INTRAVENOUS; SOFT TISSUE
Status: COMPLETED | OUTPATIENT
Start: 2017-10-26 | End: 2017-10-26

## 2017-10-26 RX ORDER — HYDROCODONE BITARTRATE AND ACETAMINOPHEN 7.5; 325 MG/15ML; MG/15ML
7.5 SOLUTION ORAL
Status: COMPLETED | OUTPATIENT
Start: 2017-10-26 | End: 2017-10-26

## 2017-10-26 RX ADMIN — DEXAMETHASONE SODIUM PHOSPHATE 10 MG: 4 INJECTION, SOLUTION INTRAMUSCULAR; INTRAVENOUS at 21:50

## 2017-10-26 RX ADMIN — HYDROCODONE BITARTRATE AND ACETAMINOPHEN 7.5 MG: 2.5; 108 SOLUTION ORAL at 21:50
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
## 2017-10-27 NOTE — ED NOTES
Cece Barnett PA-C at bedside to provide discharge paperwork. Vital signs stable. Pt in no apparent distress at this time. Mental status at baseline. Ambulatory to waiting room with steady gate, discharge paperwork in hand. Accompanied by male friend.

## 2017-10-27 NOTE — DISCHARGE INSTRUCTIONS
Elevated Blood Pressure: Care Instructions  Your Care Instructions    Blood pressure is a measure of how hard the blood pushes against the walls of your arteries. It's normal for blood pressure to go up and down throughout the day. But if it stays up over time, you have high blood pressure. Two numbers tell you your blood pressure. The first number is the systolic pressure. It shows how hard the blood pushes when your heart is pumping. The second number is the diastolic pressure. It shows how hard the blood pushes between heartbeats, when your heart is relaxed and filling with blood. An ideal blood pressure in adults is less than 120/80 (say \"120 over 80\"). High blood pressure is 140/90 or higher. You have high blood pressure if your top number is 140 or higher or your bottom number is 90 or higher, or both. The main test for high blood pressure is simple, fast, and painless. To diagnose high blood pressure, your doctor will test your blood pressure at different times. After testing your blood pressure, your doctor may ask you to test it again when you are home. If you are diagnosed with high blood pressure, you can work with your doctor to make a long-term plan to manage it. Follow-up care is a key part of your treatment and safety. Be sure to make and go to all appointments, and call your doctor if you are having problems. It's also a good idea to know your test results and keep a list of the medicines you take. How can you care for yourself at home? · Do not smoke. Smoking increases your risk for heart attack and stroke. If you need help quitting, talk to your doctor about stop-smoking programs and medicines. These can increase your chances of quitting for good. · Stay at a healthy weight. · Try to limit how much sodium you eat to less than 2,300 milligrams (mg) a day. Your doctor may ask you to try to eat less than 1,500 mg a day. · Be physically active.  Get at least 30 minutes of exercise on most days of the week. Walking is a good choice. You also may want to do other activities, such as running, swimming, cycling, or playing tennis or team sports. · Avoid or limit alcohol. Talk to your doctor about whether you can drink any alcohol. · Eat plenty of fruits, vegetables, and low-fat dairy products. Eat less saturated and total fats. · Learn how to check your blood pressure at home. When should you call for help? Call your doctor now or seek immediate medical care if:  ? · Your blood pressure is much higher than normal (such as 180/110 or higher). ? · You think high blood pressure is causing symptoms such as:  ¨ Severe headache. ¨ Blurry vision. ? Watch closely for changes in your health, and be sure to contact your doctor if:  ? · You do not get better as expected. Where can you learn more? Go to http://geetaMDLIVEdana.info/. Enter E499 in the search box to learn more about \"Elevated Blood Pressure: Care Instructions. \"  Current as of: September 21, 2016  Content Version: 11.4  © 6562-7204 [employer]. Care instructions adapted under license by [employer]. (which disclaims liability or warranty for this information). If you have questions about a medical condition or this instruction, always ask your healthcare professional. [address] any warranty or liability for your use of this information. Sore Throat: Care Instructions  Your Care Instructions    Infection by bacteria or a virus causes most sore throats. Cigarette smoke, dry air, air pollution, allergies, and yelling can also cause a sore throat. Sore throats can be painful and annoying. Fortunately, most sore throats go away on their own. If you have a bacterial infection, your doctor may prescribe antibiotics. Follow-up care is a key part of your treatment and safety. Be sure to make and go to all appointments, and call your doctor if you are having problems.  It's also a good idea to know your test results and keep a list of the medicines you take. How can you care for yourself at home? · If your doctor prescribed antibiotics, take them as directed. Do not stop taking them just because you feel better. You need to take the full course of antibiotics. · Gargle with warm salt water once an hour to help reduce swelling and relieve discomfort. Use 1 teaspoon of salt mixed in 1 cup of warm water. · Take an over-the-counter pain medicine, such as acetaminophen (Tylenol), ibuprofen (Advil, Motrin), or naproxen (Aleve). Read and follow all instructions on the label. · Be careful when taking over-the-counter cold or flu medicines and Tylenol at the same time. Many of these medicines have acetaminophen, which is Tylenol. Read the labels to make sure that you are not taking more than the recommended dose. Too much acetaminophen (Tylenol) can be harmful. · Drink plenty of fluids. Fluids may help soothe an irritated throat. Hot fluids, such as tea or soup, may help decrease throat pain. · Use over-the-counter throat lozenges to soothe pain. Regular cough drops or hard candy may also help. These should not be given to young children because of the risk of choking. · Do not smoke or allow others to smoke around you. If you need help quitting, talk to your doctor about stop-smoking programs and medicines. These can increase your chances of quitting for good. · Use a vaporizer or humidifier to add moisture to your bedroom. Follow the directions for cleaning the machine. When should you call for help? Call your doctor now or seek immediate medical care if:  ? · You have new or worse trouble swallowing. ? · Your sore throat gets much worse on one side. ? Watch closely for changes in your health, and be sure to contact your doctor if you do not get better as expected. Where can you learn more? Go to http://geeta-dana.info/.   Enter D850 in the search box to learn more about \"Sore Throat: Care Instructions. \"  Current as of: May 12, 2017  Content Version: 11.4  © 4602-3406 [employer], [employer]. Care instructions adapted under license by [employer] (which disclaims liability or warranty for this information). If you have questions about a medical condition or this instruction, always ask your healthcare professional. [address] any warranty or liability for your use of this information.

## 2017-10-29 LAB
BACTERIA SPEC CULT: NORMAL
SERVICE CMNT-IMP: NORMAL

## 2018-01-31 ENCOUNTER — HOSPITAL ENCOUNTER (INPATIENT)
Age: 33
LOS: 1 days | Discharge: LEFT BEFORE DISCHARGE | DRG: 101 | End: 2018-02-01
Attending: EMERGENCY MEDICINE | Admitting: INTERNAL MEDICINE
Payer: MEDICARE

## 2018-01-31 DIAGNOSIS — G40.909 RECURRENT SEIZURES (HCC): Primary | ICD-10-CM

## 2018-01-31 DIAGNOSIS — R56.9 SEIZURE (HCC): ICD-10-CM

## 2018-01-31 DIAGNOSIS — G40.201 PARTIAL SYMPTOMATIC EPILEPSY WITH COMPLEX PARTIAL SEIZURES, NOT INTRACTABLE, WITH STATUS EPILEPTICUS (HCC): ICD-10-CM

## 2018-01-31 DIAGNOSIS — S49.92XA SHOULDER INJURY, LEFT, INITIAL ENCOUNTER: ICD-10-CM

## 2018-01-31 LAB
ALBUMIN SERPL-MCNC: 4.3 G/DL (ref 3.5–5)
ALBUMIN/GLOB SERPL: 1.2 {RATIO} (ref 1.1–2.2)
ALP SERPL-CCNC: 113 U/L (ref 45–117)
ALT SERPL-CCNC: 56 U/L (ref 12–78)
ANION GAP SERPL CALC-SCNC: 9 MMOL/L (ref 5–15)
AST SERPL-CCNC: 26 U/L (ref 15–37)
BASOPHILS # BLD: 0.1 K/UL (ref 0–0.1)
BASOPHILS NFR BLD: 0 % (ref 0–1)
BILIRUB SERPL-MCNC: 0.3 MG/DL (ref 0.2–1)
BUN SERPL-MCNC: 9 MG/DL (ref 6–20)
BUN/CREAT SERPL: 11 (ref 12–20)
CALCIUM SERPL-MCNC: 9 MG/DL (ref 8.5–10.1)
CHLORIDE SERPL-SCNC: 105 MMOL/L (ref 97–108)
CO2 SERPL-SCNC: 28 MMOL/L (ref 21–32)
CREAT SERPL-MCNC: 0.84 MG/DL (ref 0.7–1.3)
DIFFERENTIAL METHOD BLD: ABNORMAL
EOSINOPHIL # BLD: 0.1 K/UL (ref 0–0.4)
EOSINOPHIL NFR BLD: 1 % (ref 0–7)
ERYTHROCYTE [DISTWIDTH] IN BLOOD BY AUTOMATED COUNT: 12.9 % (ref 11.5–14.5)
GLOBULIN SER CALC-MCNC: 3.6 G/DL (ref 2–4)
GLUCOSE SERPL-MCNC: 111 MG/DL (ref 65–100)
HCT VFR BLD AUTO: 44.9 % (ref 36.6–50.3)
HGB BLD-MCNC: 15.3 G/DL (ref 12.1–17)
IMM GRANULOCYTES # BLD: 0.1 K/UL (ref 0–0.04)
IMM GRANULOCYTES NFR BLD AUTO: 0 % (ref 0–0.5)
LYMPHOCYTES # BLD: 2.3 K/UL (ref 0.8–3.5)
LYMPHOCYTES NFR BLD: 12 % (ref 12–49)
MCH RBC QN AUTO: 30.9 PG (ref 26–34)
MCHC RBC AUTO-ENTMCNC: 34.1 G/DL (ref 30–36.5)
MCV RBC AUTO: 90.7 FL (ref 80–99)
MONOCYTES # BLD: 0.7 K/UL (ref 0–1)
MONOCYTES NFR BLD: 4 % (ref 5–13)
NEUTS SEG # BLD: 15.7 K/UL (ref 1.8–8)
NEUTS SEG NFR BLD: 83 % (ref 32–75)
NRBC # BLD: 0 K/UL (ref 0–0.01)
NRBC BLD-RTO: 0 PER 100 WBC
PHENYTOIN SERPL-MCNC: 1.5 UG/ML (ref 10–20)
PLATELET # BLD AUTO: 253 K/UL (ref 150–400)
PMV BLD AUTO: 10.9 FL (ref 8.9–12.9)
POTASSIUM SERPL-SCNC: 4 MMOL/L (ref 3.5–5.1)
PROT SERPL-MCNC: 7.9 G/DL (ref 6.4–8.2)
RBC # BLD AUTO: 4.95 M/UL (ref 4.1–5.7)
SODIUM SERPL-SCNC: 142 MMOL/L (ref 136–145)
WBC # BLD AUTO: 18.9 K/UL (ref 4.1–11.1)

## 2018-01-31 PROCEDURE — 96375 TX/PRO/DX INJ NEW DRUG ADDON: CPT

## 2018-01-31 PROCEDURE — 74011000258 HC RX REV CODE- 258: Performed by: EMERGENCY MEDICINE

## 2018-01-31 PROCEDURE — 99285 EMERGENCY DEPT VISIT HI MDM: CPT

## 2018-01-31 PROCEDURE — 96365 THER/PROPH/DIAG IV INF INIT: CPT

## 2018-01-31 PROCEDURE — 74011250636 HC RX REV CODE- 250/636: Performed by: EMERGENCY MEDICINE

## 2018-01-31 PROCEDURE — 36415 COLL VENOUS BLD VENIPUNCTURE: CPT | Performed by: EMERGENCY MEDICINE

## 2018-01-31 PROCEDURE — 80175 DRUG SCREEN QUAN LAMOTRIGINE: CPT | Performed by: EMERGENCY MEDICINE

## 2018-01-31 PROCEDURE — 74011250637 HC RX REV CODE- 250/637: Performed by: EMERGENCY MEDICINE

## 2018-01-31 PROCEDURE — 80177 DRUG SCRN QUAN LEVETIRACETAM: CPT | Performed by: EMERGENCY MEDICINE

## 2018-01-31 PROCEDURE — 80053 COMPREHEN METABOLIC PANEL: CPT | Performed by: EMERGENCY MEDICINE

## 2018-01-31 PROCEDURE — 80185 ASSAY OF PHENYTOIN TOTAL: CPT | Performed by: EMERGENCY MEDICINE

## 2018-01-31 PROCEDURE — 85025 COMPLETE CBC W/AUTO DIFF WBC: CPT | Performed by: EMERGENCY MEDICINE

## 2018-01-31 RX ORDER — LAMOTRIGINE 100 MG/1
100 TABLET ORAL ONCE
Status: COMPLETED | OUTPATIENT
Start: 2018-01-31 | End: 2018-01-31

## 2018-01-31 RX ORDER — PHENYTOIN SODIUM 50 MG/ML
1000 INJECTION, SOLUTION INTRAMUSCULAR; INTRAVENOUS
Status: DISCONTINUED | OUTPATIENT
Start: 2018-01-31 | End: 2018-01-31 | Stop reason: SDUPTHER

## 2018-01-31 RX ADMIN — LAMOTRIGINE 100 MG: 100 TABLET ORAL at 23:56

## 2018-01-31 RX ADMIN — SODIUM CHLORIDE 1000 MG: 900 INJECTION, SOLUTION INTRAVENOUS at 23:24

## 2018-01-31 RX ADMIN — PHENYTOIN SODIUM 1000 MG: 50 INJECTION INTRAMUSCULAR; INTRAVENOUS at 23:56

## 2018-02-01 ENCOUNTER — APPOINTMENT (OUTPATIENT)
Dept: CT IMAGING | Age: 33
DRG: 101 | End: 2018-02-01
Attending: INTERNAL MEDICINE
Payer: MEDICARE

## 2018-02-01 ENCOUNTER — APPOINTMENT (OUTPATIENT)
Dept: GENERAL RADIOLOGY | Age: 33
DRG: 101 | End: 2018-02-01
Attending: INTERNAL MEDICINE
Payer: MEDICARE

## 2018-02-01 VITALS
RESPIRATION RATE: 19 BRPM | BODY MASS INDEX: 39.65 KG/M2 | DIASTOLIC BLOOD PRESSURE: 62 MMHG | OXYGEN SATURATION: 96 % | SYSTOLIC BLOOD PRESSURE: 112 MMHG | HEIGHT: 70 IN | HEART RATE: 72 BPM | TEMPERATURE: 98.4 F | WEIGHT: 277 LBS

## 2018-02-01 LAB
ALBUMIN SERPL-MCNC: 4 G/DL (ref 3.5–5)
ALBUMIN/GLOB SERPL: 1.2 {RATIO} (ref 1.1–2.2)
ALP SERPL-CCNC: 98 U/L (ref 45–117)
ALT SERPL-CCNC: 47 U/L (ref 12–78)
AMPHET UR QL SCN: NEGATIVE
ANION GAP SERPL CALC-SCNC: 9 MMOL/L (ref 5–15)
APPEARANCE UR: CLEAR
AST SERPL-CCNC: 18 U/L (ref 15–37)
BACTERIA URNS QL MICRO: NEGATIVE /HPF
BARBITURATES UR QL SCN: NEGATIVE
BASOPHILS # BLD: 0.1 K/UL (ref 0–0.1)
BASOPHILS NFR BLD: 0 % (ref 0–1)
BENZODIAZ UR QL: NEGATIVE
BILIRUB SERPL-MCNC: 0.3 MG/DL (ref 0.2–1)
BILIRUB UR QL: NEGATIVE
BUN SERPL-MCNC: 9 MG/DL (ref 6–20)
BUN/CREAT SERPL: 11 (ref 12–20)
CALCIUM SERPL-MCNC: 8.7 MG/DL (ref 8.5–10.1)
CANNABINOIDS UR QL SCN: POSITIVE
CHLORIDE SERPL-SCNC: 106 MMOL/L (ref 97–108)
CHOLEST SERPL-MCNC: 186 MG/DL
CO2 SERPL-SCNC: 26 MMOL/L (ref 21–32)
COCAINE UR QL SCN: NEGATIVE
COLOR UR: NORMAL
CREAT SERPL-MCNC: 0.81 MG/DL (ref 0.7–1.3)
CRP SERPL-MCNC: 1.13 MG/DL (ref 0–0.6)
DIFFERENTIAL METHOD BLD: ABNORMAL
DRUG SCRN COMMENT,DRGCM: ABNORMAL
EOSINOPHIL # BLD: 0.2 K/UL (ref 0–0.4)
EOSINOPHIL NFR BLD: 2 % (ref 0–7)
EPITH CASTS URNS QL MICRO: NORMAL /LPF
ERYTHROCYTE [DISTWIDTH] IN BLOOD BY AUTOMATED COUNT: 12.9 % (ref 11.5–14.5)
ERYTHROCYTE [SEDIMENTATION RATE] IN BLOOD: 6 MM/HR (ref 0–15)
GLOBULIN SER CALC-MCNC: 3.3 G/DL (ref 2–4)
GLUCOSE SERPL-MCNC: 82 MG/DL (ref 65–100)
GLUCOSE UR STRIP.AUTO-MCNC: NEGATIVE MG/DL
HCT VFR BLD AUTO: 41.6 % (ref 36.6–50.3)
HDLC SERPL-MCNC: 33 MG/DL
HDLC SERPL: 5.6 {RATIO} (ref 0–5)
HGB BLD-MCNC: 14 G/DL (ref 12.1–17)
HGB UR QL STRIP: NEGATIVE
HYALINE CASTS URNS QL MICRO: NORMAL /LPF (ref 0–5)
IMM GRANULOCYTES # BLD: 0.1 K/UL (ref 0–0.04)
IMM GRANULOCYTES NFR BLD AUTO: 0 % (ref 0–0.5)
KETONES UR QL STRIP.AUTO: NEGATIVE MG/DL
LACTATE SERPL-SCNC: 0.8 MMOL/L (ref 0.4–2)
LDLC SERPL CALC-MCNC: 121.8 MG/DL (ref 0–100)
LEUKOCYTE ESTERASE UR QL STRIP.AUTO: NEGATIVE
LIPID PROFILE,FLP: ABNORMAL
LYMPHOCYTES # BLD: 3.4 K/UL (ref 0.8–3.5)
LYMPHOCYTES NFR BLD: 26 % (ref 12–49)
MAGNESIUM SERPL-MCNC: 1.8 MG/DL (ref 1.6–2.4)
MCH RBC QN AUTO: 30.5 PG (ref 26–34)
MCHC RBC AUTO-ENTMCNC: 33.7 G/DL (ref 30–36.5)
MCV RBC AUTO: 90.6 FL (ref 80–99)
METHADONE UR QL: NEGATIVE
MONOCYTES # BLD: 0.8 K/UL (ref 0–1)
MONOCYTES NFR BLD: 6 % (ref 5–13)
NEUTS SEG # BLD: 8.5 K/UL (ref 1.8–8)
NEUTS SEG NFR BLD: 66 % (ref 32–75)
NITRITE UR QL STRIP.AUTO: NEGATIVE
NRBC # BLD: 0 K/UL (ref 0–0.01)
NRBC BLD-RTO: 0 PER 100 WBC
OPIATES UR QL: NEGATIVE
PCP UR QL: NEGATIVE
PH UR STRIP: 6 [PH] (ref 5–8)
PHENYTOIN SERPL-MCNC: 9.8 UG/ML (ref 10–20)
PHOSPHATE SERPL-MCNC: 4.3 MG/DL (ref 2.6–4.7)
PLATELET # BLD AUTO: 215 K/UL (ref 150–400)
PMV BLD AUTO: 10.8 FL (ref 8.9–12.9)
POTASSIUM SERPL-SCNC: 3.8 MMOL/L (ref 3.5–5.1)
PROT SERPL-MCNC: 7.3 G/DL (ref 6.4–8.2)
PROT UR STRIP-MCNC: NEGATIVE MG/DL
RBC # BLD AUTO: 4.59 M/UL (ref 4.1–5.7)
RBC #/AREA URNS HPF: NORMAL /HPF (ref 0–5)
SODIUM SERPL-SCNC: 141 MMOL/L (ref 136–145)
SP GR UR REFRACTOMETRY: 1.02 (ref 1–1.03)
TRIGL SERPL-MCNC: 156 MG/DL (ref ?–150)
TSH SERPL DL<=0.05 MIU/L-ACNC: 1.68 UIU/ML (ref 0.36–3.74)
UROBILINOGEN UR QL STRIP.AUTO: 1 EU/DL (ref 0.2–1)
VLDLC SERPL CALC-MCNC: 31.2 MG/DL
WBC # BLD AUTO: 13 K/UL (ref 4.1–11.1)
WBC URNS QL MICRO: NORMAL /HPF (ref 0–4)

## 2018-02-01 PROCEDURE — 71045 X-RAY EXAM CHEST 1 VIEW: CPT

## 2018-02-01 PROCEDURE — 80053 COMPREHEN METABOLIC PANEL: CPT | Performed by: INTERNAL MEDICINE

## 2018-02-01 PROCEDURE — 83605 ASSAY OF LACTIC ACID: CPT | Performed by: INTERNAL MEDICINE

## 2018-02-01 PROCEDURE — 80185 ASSAY OF PHENYTOIN TOTAL: CPT | Performed by: PSYCHIATRY & NEUROLOGY

## 2018-02-01 PROCEDURE — 85652 RBC SED RATE AUTOMATED: CPT | Performed by: INTERNAL MEDICINE

## 2018-02-01 PROCEDURE — 86140 C-REACTIVE PROTEIN: CPT | Performed by: INTERNAL MEDICINE

## 2018-02-01 PROCEDURE — 84100 ASSAY OF PHOSPHORUS: CPT | Performed by: INTERNAL MEDICINE

## 2018-02-01 PROCEDURE — 74011250637 HC RX REV CODE- 250/637: Performed by: INTERNAL MEDICINE

## 2018-02-01 PROCEDURE — 65660000000 HC RM CCU STEPDOWN

## 2018-02-01 PROCEDURE — 81003 URINALYSIS AUTO W/O SCOPE: CPT | Performed by: EMERGENCY MEDICINE

## 2018-02-01 PROCEDURE — 74011250636 HC RX REV CODE- 250/636: Performed by: INTERNAL MEDICINE

## 2018-02-01 PROCEDURE — 80307 DRUG TEST PRSMV CHEM ANLYZR: CPT | Performed by: EMERGENCY MEDICINE

## 2018-02-01 PROCEDURE — 95816 EEG AWAKE AND DROWSY: CPT | Performed by: INTERNAL MEDICINE

## 2018-02-01 PROCEDURE — 84443 ASSAY THYROID STIM HORMONE: CPT | Performed by: INTERNAL MEDICINE

## 2018-02-01 PROCEDURE — 85025 COMPLETE CBC W/AUTO DIFF WBC: CPT | Performed by: INTERNAL MEDICINE

## 2018-02-01 PROCEDURE — 83735 ASSAY OF MAGNESIUM: CPT | Performed by: INTERNAL MEDICINE

## 2018-02-01 PROCEDURE — 70450 CT HEAD/BRAIN W/O DYE: CPT

## 2018-02-01 PROCEDURE — 80061 LIPID PANEL: CPT | Performed by: INTERNAL MEDICINE

## 2018-02-01 PROCEDURE — 36415 COLL VENOUS BLD VENIPUNCTURE: CPT | Performed by: INTERNAL MEDICINE

## 2018-02-01 RX ORDER — CYCLOBENZAPRINE HCL 10 MG
10 TABLET ORAL
Status: DISCONTINUED | OUTPATIENT
Start: 2018-02-01 | End: 2018-02-01 | Stop reason: HOSPADM

## 2018-02-01 RX ORDER — IBUPROFEN 200 MG
1 TABLET ORAL EVERY 24 HOURS
Status: DISCONTINUED | OUTPATIENT
Start: 2018-02-01 | End: 2018-02-01 | Stop reason: HOSPADM

## 2018-02-01 RX ORDER — HYDRALAZINE HYDROCHLORIDE 20 MG/ML
10 INJECTION INTRAMUSCULAR; INTRAVENOUS
Status: DISCONTINUED | OUTPATIENT
Start: 2018-02-01 | End: 2018-02-01 | Stop reason: HOSPADM

## 2018-02-01 RX ORDER — ACETAMINOPHEN 325 MG/1
650 TABLET ORAL
Status: DISCONTINUED | OUTPATIENT
Start: 2018-02-01 | End: 2018-02-01 | Stop reason: HOSPADM

## 2018-02-01 RX ORDER — DOCUSATE SODIUM 100 MG/1
100 CAPSULE, LIQUID FILLED ORAL 2 TIMES DAILY
Status: DISCONTINUED | OUTPATIENT
Start: 2018-02-01 | End: 2018-02-01 | Stop reason: HOSPADM

## 2018-02-01 RX ORDER — DIPHENHYDRAMINE HYDROCHLORIDE 50 MG/ML
25 INJECTION, SOLUTION INTRAMUSCULAR; INTRAVENOUS ONCE
Status: COMPLETED | OUTPATIENT
Start: 2018-02-01 | End: 2018-02-01

## 2018-02-01 RX ORDER — LISINOPRIL 20 MG/1
20 TABLET ORAL DAILY
Status: DISCONTINUED | OUTPATIENT
Start: 2018-02-01 | End: 2018-02-01 | Stop reason: HOSPADM

## 2018-02-01 RX ORDER — FLUOXETINE HYDROCHLORIDE 40 MG/1
40 CAPSULE ORAL DAILY
COMMUNITY
End: 2018-07-26

## 2018-02-01 RX ORDER — LEVETIRACETAM 500 MG/1
1500 TABLET ORAL 2 TIMES DAILY
Status: DISCONTINUED | OUTPATIENT
Start: 2018-02-01 | End: 2018-02-01 | Stop reason: HOSPADM

## 2018-02-01 RX ORDER — HYDROCODONE BITARTRATE AND ACETAMINOPHEN 5; 325 MG/1; MG/1
1 TABLET ORAL
Status: DISCONTINUED | OUTPATIENT
Start: 2018-02-01 | End: 2018-02-01 | Stop reason: HOSPADM

## 2018-02-01 RX ORDER — ONDANSETRON 2 MG/ML
4 INJECTION INTRAMUSCULAR; INTRAVENOUS
Status: DISCONTINUED | OUTPATIENT
Start: 2018-02-01 | End: 2018-02-01 | Stop reason: HOSPADM

## 2018-02-01 RX ORDER — SODIUM CHLORIDE 0.9 % (FLUSH) 0.9 %
5-10 SYRINGE (ML) INJECTION AS NEEDED
Status: DISCONTINUED | OUTPATIENT
Start: 2018-02-01 | End: 2018-02-01 | Stop reason: HOSPADM

## 2018-02-01 RX ORDER — SODIUM CHLORIDE 0.9 % (FLUSH) 0.9 %
5-10 SYRINGE (ML) INJECTION EVERY 8 HOURS
Status: DISCONTINUED | OUTPATIENT
Start: 2018-02-01 | End: 2018-02-01 | Stop reason: HOSPADM

## 2018-02-01 RX ORDER — HYDROMORPHONE HYDROCHLORIDE 1 MG/ML
1 INJECTION, SOLUTION INTRAMUSCULAR; INTRAVENOUS; SUBCUTANEOUS
Status: DISCONTINUED | OUTPATIENT
Start: 2018-02-01 | End: 2018-02-01 | Stop reason: HOSPADM

## 2018-02-01 RX ORDER — LORAZEPAM 2 MG/ML
1 INJECTION INTRAMUSCULAR
Status: DISCONTINUED | OUTPATIENT
Start: 2018-02-01 | End: 2018-02-01 | Stop reason: HOSPADM

## 2018-02-01 RX ORDER — CLONAZEPAM 1 MG/1
1 TABLET ORAL 3 TIMES DAILY
Status: DISCONTINUED | OUTPATIENT
Start: 2018-02-01 | End: 2018-02-01 | Stop reason: HOSPADM

## 2018-02-01 RX ORDER — LOVASTATIN 20 MG/1
20 TABLET ORAL
Status: DISCONTINUED | OUTPATIENT
Start: 2018-02-01 | End: 2018-02-01 | Stop reason: HOSPADM

## 2018-02-01 RX ORDER — FLUOXETINE HYDROCHLORIDE 20 MG/1
40 CAPSULE ORAL DAILY
Status: DISCONTINUED | OUTPATIENT
Start: 2018-02-01 | End: 2018-02-01 | Stop reason: HOSPADM

## 2018-02-01 RX ORDER — SODIUM CHLORIDE 9 MG/ML
100 INJECTION, SOLUTION INTRAVENOUS CONTINUOUS
Status: DISCONTINUED | OUTPATIENT
Start: 2018-02-01 | End: 2018-02-01 | Stop reason: HOSPADM

## 2018-02-01 RX ORDER — LAMOTRIGINE 100 MG/1
200 TABLET ORAL 2 TIMES DAILY
Status: DISCONTINUED | OUTPATIENT
Start: 2018-02-01 | End: 2018-02-01 | Stop reason: HOSPADM

## 2018-02-01 RX ORDER — HYDROCHLOROTHIAZIDE 25 MG/1
25 TABLET ORAL DAILY
Status: DISCONTINUED | OUTPATIENT
Start: 2018-02-01 | End: 2018-02-01 | Stop reason: HOSPADM

## 2018-02-01 RX ORDER — PHENYTOIN SODIUM 100 MG/1
200 CAPSULE, EXTENDED RELEASE ORAL 3 TIMES DAILY
Status: DISCONTINUED | OUTPATIENT
Start: 2018-02-01 | End: 2018-02-01 | Stop reason: HOSPADM

## 2018-02-01 RX ADMIN — SODIUM CHLORIDE 100 ML/HR: 900 INJECTION, SOLUTION INTRAVENOUS at 13:49

## 2018-02-01 RX ADMIN — ONDANSETRON 4 MG: 2 INJECTION INTRAMUSCULAR; INTRAVENOUS at 09:36

## 2018-02-01 RX ADMIN — HYDROCHLOROTHIAZIDE 25 MG: 25 TABLET ORAL at 10:40

## 2018-02-01 RX ADMIN — SODIUM CHLORIDE 100 ML/HR: 900 INJECTION, SOLUTION INTRAVENOUS at 04:17

## 2018-02-01 RX ADMIN — LAMOTRIGINE 200 MG: 100 TABLET ORAL at 10:40

## 2018-02-01 RX ADMIN — Medication 10 ML: at 13:43

## 2018-02-01 RX ADMIN — HYDROMORPHONE HYDROCHLORIDE 1 MG: 1 INJECTION, SOLUTION INTRAMUSCULAR; INTRAVENOUS; SUBCUTANEOUS at 05:49

## 2018-02-01 RX ADMIN — LEVETIRACETAM 1500 MG: 500 TABLET ORAL at 10:40

## 2018-02-01 RX ADMIN — LOVASTATIN 20 MG: 20 TABLET ORAL at 04:16

## 2018-02-01 RX ADMIN — DIPHENHYDRAMINE HYDROCHLORIDE 25 MG: 50 INJECTION, SOLUTION INTRAMUSCULAR; INTRAVENOUS at 04:14

## 2018-02-01 RX ADMIN — HYDROMORPHONE HYDROCHLORIDE 1 MG: 1 INJECTION, SOLUTION INTRAMUSCULAR; INTRAVENOUS; SUBCUTANEOUS at 01:51

## 2018-02-01 RX ADMIN — HYDROMORPHONE HYDROCHLORIDE 1 MG: 1 INJECTION, SOLUTION INTRAMUSCULAR; INTRAVENOUS; SUBCUTANEOUS at 13:43

## 2018-02-01 RX ADMIN — ONDANSETRON 4 MG: 2 INJECTION INTRAMUSCULAR; INTRAVENOUS at 01:51

## 2018-02-01 RX ADMIN — LISINOPRIL 20 MG: 20 TABLET ORAL at 10:40

## 2018-02-01 RX ADMIN — PHENYTOIN SODIUM 200 MG: 100 CAPSULE ORAL at 10:40

## 2018-02-01 RX ADMIN — Medication 10 ML: at 05:49

## 2018-02-01 RX ADMIN — HYDROMORPHONE HYDROCHLORIDE 1 MG: 1 INJECTION, SOLUTION INTRAMUSCULAR; INTRAVENOUS; SUBCUTANEOUS at 09:36

## 2018-02-01 RX ADMIN — FLUOXETINE 40 MG: 20 CAPSULE ORAL at 10:40

## 2018-02-01 RX ADMIN — ONDANSETRON 4 MG: 2 INJECTION INTRAMUSCULAR; INTRAVENOUS at 13:43

## 2018-02-01 RX ADMIN — ONDANSETRON 4 MG: 2 INJECTION INTRAMUSCULAR; INTRAVENOUS at 05:49
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
## 2018-02-01 NOTE — PROGRESS NOTES
Patient requesting the following to be addressed during this admission:   1. Pt would like to have his Advanced Care Directives to be updated  2. He is requesting to be set up with a PCP prior to discharge  3.  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

## 2018-02-01 NOTE — PROGRESS NOTES
Bedside and Verbal shift change report given to Eduard Uribe (oncoming nurse) by John Moran (offgoing nurse). Report included the following information SBAR, Kardex, Intake/Output, MAR and Recent Results.

## 2018-02-01 NOTE — ED TRIAGE NOTES
Triage Note: Patient reports 4 seizures in last 2 hours. Hx of seizures. Patient has been taking medications as prescribed.

## 2018-02-01 NOTE — H&P
[address]  ACUTE CARE HISTORY AND PHYSICAL    Melisa Pierce  MR#: [id number]  : 1985  ACCOUNT #: [de-identified]   DATE OF SERVICE: 2018    PRIMARY CARE PHYSICIAN:  None. SOURCE OF INFORMATION:  Patient. CHIEF COMPLAINT:  Seizure. HISTORY OF PRESENT ILLNESS:  This is a 81-year-old man with a past medical history significant for seizure disorder, hypertension, bipolar disorder, dyslipidemia, anxiety/depression who was in his usual state of health until the day of presentation at the emergency room when the patient developed a seizure. According to report, the patient had 4 seizures before coming to the emergency room. Three episodes were witnessed by the patient's spouse. The patient has had a seizure disorder for the past 17 years following a traumatic brain injury. According to the patient for the past 1 month, he has been having seizure activity more frequently than usual.  He has seen his neurologist at [facility] and was told that the patient may require further intervention if he continues to have a seizure on present therapy. The seizure was described as tonic-clonic activity. Patient was brought to the emergency room for further evaluation. Although the patient stated that he has been taking his prescribed medication, which includes Dilantin, his Dilantin level in the emergency room was subtherapeutic. Patient also stated that he has generalized body aches following the multiple seizure activity. Patient described the body aches as severe, 10/10 in severity. No known relieving factors. Aggravated by the seizure activity. The pain is constant. The patient has no fever, no rigors and no chills. When the patient arrived at the emergency room, he was loaded with Keppra and Dilantin and was referred to the hospitalist service for evaluation for admission.     PAST MEDICAL HISTORY:  Seizure disorder, dyslipidemia, anxiety/depression, bipolar disorder, hypertension. ALLERGIES:  PATIENT IS ALLERGIC TO IODINE, SHELLFISH. MEDICATIONS:  Klonopin 1 mg 3 times daily, Flexeril 10 mg 3 times daily as needed for muscle spasm, Prozac 40 mg daily, hydrochlorothiazide 25 mg daily, Norco 5/325 one tablet every 6 hours as needed for pain, Lamictal 200 mg twice daily, Keppra 1500 mg twice daily, lisinopril 20 mg daily, Mevacor 20 mg daily, naproxen 500 mg every 12 hours as needed for pain, Dilantin 200 mg 3 times daily. FAMILY HISTORY:  This was reviewed. His father had depression and cancer. The type of cancer is not known. PAST SURGICAL HISTORY:  This is significant for right frontal lobectomy. SOCIAL HISTORY:  The patient smokes about half a pack of cigarettes daily. Admits to use of marijuana. Denies alcohol abuse. REVIEW OF SYSTEMS:    HEAD, EYES, EARS, NOSE AND THROAT:  This is positive for seizure and headache. No blurring of vision. No photophobia. RESPIRATORY:  No cough, no shortness of breath, no hemoptysis. CARDIOVASCULAR:  No chest pain, no orthopnea, no palpitation. GASTROINTESTINAL:  No nausea or vomiting. No diarrhea. No constipation. GENITOURINARY:  No dysuria, no urgency, no frequency. All other systems are reviewed and they are negative. PHYSICAL EXAMINATION:  GENERAL APPEARANCE:  Patient appeared ill, in moderate distress. VITAL SIGNS:  On arrival at the emergency room, temperature 98, pulse 102, respiratory rate 18, blood pressure 164/109, oxygen saturation 97% on room air. HEENT:  Head:  Normocephalic. Craniotomy scar noted. Eyes:  Normal eye movement. No redness, no drainage, no discharge. Ears:  Normal external ears with no evidence of drainage. Nose:  No deformity and no drainage. Mouth and throat:  No visible oral lesion. NECK:  Supple, no JVD, no thyromegaly. CHEST:  Clear breath sounds: No wheezing, no crackles. HEART:  Normal S1 and S2, regular. No clinically appreciable murmur.   ABDOMEN:  Soft, nontender, normal bowel sounds. CENTRAL NERVOUS SYSTEM:  Alert, oriented x3. No gross focal neurological deficit. EXTREMITIES:  No edema. Pulses 2+ bilaterally. MUSCULOSKELETAL:  No obvious joint deformity or swelling. SKIN:  No active skin lesion seen on the exposed part of the body. PSYCHIATRIC:  Normal mood and affect. LYMPHATIC:  No cervical lymphadenopathy. DIAGNOSTIC DATA:  None. LABORATORY DATA:  Urinary toxicology: This is positive for cannabinoids. Urinalysis: This is significant for negative nitrites, negative leukocyte esterase, negative bacteria. Chemistry:  Sodium 142, potassium 4.0, chloride 105, CO2 of 28, glucose 111, BUN 9, creatinine 0.84. Calcium of 9.0, bilirubin total 0.3, ALT 56, AST 26, alkaline phosphatase 115, total protein 7.9, albumin level 4.3, globulin 3.6. Dilantin level 1.5. Hematology:  WBC 18.9, hemoglobin 15.3, hematocrit 44.9, platelets of 813. ASSESSMENT:  1.  Seizure disorder. 2.  Marijuana use. 3.  Leukocytosis. 4.  Hypertension. 5.  Bipolar disorder. 6.  Dyslipidemia. 7.  Tobacco abuse. PLAN:  1. Seizure disorder. We will admit the patient for further evaluation and treatment. We will continue with preadmission medication. Patient has been loaded with Dilantin and Keppra in the emergency room. Patient is status post traumatic brain injury. We will obtain a CT scan of the head. We will also obtain an EEG. Inpatient neurology consult will be requested to assist in evaluation and treatment of seizure disorder. Patient will also be placed on Ativan as needed for seizure. Will await further recommendation from the neurologist.    2.  Marijuana use. Patient advised to quit. Will carry out supportive therapy. 3.  Leukocytosis. Patient is afebrile. Urinalysis is clear, has no sign of infection. We will obtain a chest x-ray. We will check a lactic acid level. We will check ESR, C-reactive protein level and repeat lab.   4. Hypertension. We will resume preadmission medication. Patient will also be placed on hydralazine as needed for blood pressure control. 5.  Bipolar disorder. Will continue with preadmission medication. Psychiatry consult will also be requested to assist in the evaluation and treatment of bipolar disorder. 6.  Dyslipidemia. Will resume home medication. 7.  Tobacco abuse. Patient advised to quit. We will place the patient on NicoDerm patch. 8.  Other issues:  CODE STATUS:  PATIENT IS A FULL CODE. We will request SCDs for DVT prophylaxis.       MD PITO Boucher / CRISSY  D: 02/01/2018 03:32     T: 02/01/2018 08:15  JOB #: [id number]

## 2018-02-01 NOTE — PROGRESS NOTES
Hospital follow-up PCP transitional care appointment has been scheduled with Dr. Diego Masters for Wednesday, 2/7/18 at 10:30 a.m. Pending patient discharge.   Lizbeth Rosenberg, Care Management Specialist.

## 2018-02-01 NOTE — ED NOTES
Verbal shift change report given to Lina (oncoming nurse) by Neris Glover (offgoing nurse). Report included the following information ED Summary and Recent Results.

## 2018-02-01 NOTE — PROGRESS NOTES
Primary Nurse Joycelyn Clayton RN and Terry Moctezuma RN performed a dual skin assessment on this patient No impairment noted  Dereck score is 23

## 2018-02-01 NOTE — PROGRESS NOTES
Pt admitted to evaluate possible seizures. He was not at all interested in seeing me and rather sharply though in control asked I leave, which I did. Pt with dx of bipolar illness but also with TBI. Brief interaction seems more like compromised affect control and erratic, impulsive behavior seems less like bipolar disorder than effects of TBI. Does not always speak cooperatively and validity of what he says is questionable. On mental status he was alert and oriented. Hygiene and grooming ok. Angry and dismissive. No unusual motor activity. An edge to normal speech pattern. Did not attempt cognitive testing. Conversation, albeit brief, did not show any cognitive difficulties. No suicidal/homicidal/psychotic sx. 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

## 2018-02-01 NOTE — PROGRESS NOTES
0235: pt arrived to unit via stretcher, without telemetry; RN informed tech Mireille Gentleman) that there was no bed in room and requested to please not leave patient alone in room, with telemetry monitoring on a stretcher, due to safety reasons. Transport tech responded that \"Nora Watson has told him it is okay to leave patient's in room on stretcher\"; Pt expressed his concerns for his safety that he was not comfortable being left in the room without telemetry or being seen by an RN. Per pt, transport dismissed pt's concerns regarding his safety and proceeded to leave the room without the patient having any form of monitoring. 46: RN called to room; pt expressing he would like to speak with a supervisor regarding the above. Supervisors notified of situation     5121: Mikki Lynch (nursing supervisor) at bedside. Patient expressed his concerns and was provided the number for patient advocacy to call in the morning. Unit manager (Era Pérez) to be notified of situation in the morning.  Will pass information along to day shift RN

## 2018-02-01 NOTE — PROCEDURES
ELECTROENCEPHALOGRAM REPORT     Patient Name: Jennifer Hilton  : 1985  Age: 33 y.o. Ordering physician: Berta Zepeda DO    Date of EE2018    Interpreting physician: Tod Vee DO      PROCEDURE: EEG. CLINICAL INDICATION: The patient is a 33 y.o. male who is being evaluated for baseline electro cerebral activities and to rule out seizure focus. DESCRIPTION OF THE RECORD:   The background of this recording contains a posteriorly-located occipital alpha rhythm of 10-11 Hz that attenuates with eye opening. There was a normal frequency-amplitude gradient. Throughout the recording, there were neither clear areas of focal slowing nor spike or spike-and-wave discharges seen. Hyperventilation and Photic stimulation were not performed. During the recording, the patient did enter prolonged states of sleep with K-complexes and symmetric sleep spindles seen in the central head regions. INTERPRETATION: This is a normal electroencephalogram with the patient   awake and asleep showing no clear focal abnormalities or epileptiform   activity. A normal EEG does not rule out seizures. Clinical correlation recommended.         Berta Zepeda DO  Diplomate, American Board of Psychiatry & Neurology (Neurology)
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
## 2018-02-01 NOTE — ED NOTES
Bedside shift change report given to Huey Richmond (oncoming nurse) by Jason Srinivasan (offgoing nurse). Report included the following information SBAR.     0154 Sprite given. 0200 Ambulated to bathroom with steady gait. Voided x 1    0215 TRANSFER - OUT REPORT:    Verbal report given to Merly(name) on Lima Arrington  being transferred to 6s(unit) for routine progression of care       Report consisted of patients Situation, Background, Assessment and   Recommendations(SBAR). Information from the following report(s) SBAR was reviewed with the receiving nurse. Lines:   Peripheral IV 01/31/18 Right Antecubital (Active)   Site Assessment Clean, dry, & intact 1/31/2018  9:01 PM   Phlebitis Assessment 0 1/31/2018  9:01 PM   Infiltration Assessment 0 1/31/2018  9:01 PM   Dressing Status Clean, dry, & intact 1/31/2018  9:01 PM   Dressing Type Tape;Transparent 1/31/2018  9:01 PM   Hub Color/Line Status Pink;Capped;Flushed;Patent 1/31/2018  9:01 PM   Action Taken Blood drawn 1/31/2018  9:01 PM        Opportunity for questions and clarification was provided. Patient transported with:   Monitor  Tech        . 46 Tech transported patient notified 6S nurse patient arrived. Tech attempt to place patient on cardiac monitor, patient requested 6S nurse to perform task.

## 2018-02-01 NOTE — PROGRESS NOTES
CM met with pt to introduce him to the role of CM and transition of care. He verbalized understanding. This pt lives at home with a roommate. He is independent with ADL's and IADL's. Per pt, he is in school as well. He does not have a PCP at the moment. CM called CM specialist and requested that an appointment be made for early next week. Will follow. ABIODUN Chaidez, CIERRA-PHYLLIS  Care Management Interventions  PCP Verified by CM: No  Palliative Care Criteria Met (RRAT>21 & CHF Dx)?: No  Transition of Care Consult (CM Consult): Discharge Planning  MyChart Signup: No  Discharge Durable Medical Equipment: No  Physical Therapy Consult: No  Occupational Therapy Consult: No  Speech Therapy Consult: No  Current Support Network:  Other (This pt lives with a roomate.)  Confirm Follow Up Transport: Self  Plan discussed with Pt/Family/Caregiver: Yes  Freedom of Choice Offered: Yes  [employer] Information Provided?: No
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
## 2018-02-01 NOTE — PROGRESS NOTES
Dr. Susanne Bob visited patient briefly for Psych consult. Patient stated that he did not want to talk to him and sent him out of the room. After Dr. Susanne Bob left the room, the patient quickly called out to the nurse's station to speak to his nurse. Before I (the RN) was able to get to the room, the patient came to the nurse's station demanding to speak to the nurse manager or someone in administration. The CCL went to the room to speak with the patient. The patient let the CCL know that he felt as if none of his results were reported to him and he was angry about the Psych consult that was placed in the ED. We then paged the hospitalist to see if he would come see the patient. The supervisor was also on the way to see the patient. Before the hospitalist was able to make it up the room, the patient signed AMA papers and walked off the floor at 1540.

## 2018-02-02 LAB — LEVETIRACETAM SERPL-MCNC: NORMAL UG/ML (ref 10–40)

## 2018-02-03 LAB — LAMOTRIGINE SERPL-MCNC: NORMAL UG/ML (ref 2–20)

## 2018-02-20 ENCOUNTER — HOSPITAL ENCOUNTER (EMERGENCY)
Dept: HOSPITAL 14 - H.ER | Age: 33
LOS: 1 days | Discharge: HOME | End: 2018-02-21
Payer: MEDICARE

## 2018-02-20 VITALS
HEART RATE: 103 BPM | TEMPERATURE: 98.1 F | DIASTOLIC BLOOD PRESSURE: 98 MMHG | SYSTOLIC BLOOD PRESSURE: 172 MMHG | RESPIRATION RATE: 18 BRPM | OXYGEN SATURATION: 98 %

## 2018-02-20 DIAGNOSIS — G40.909: Primary | ICD-10-CM

## 2018-02-20 LAB
BASOPHILS # BLD AUTO: 0.1 K/UL (ref 0–0.2)
BASOPHILS NFR BLD: 0.9 % (ref 0–2)
BILIRUB UR-MCNC: NEGATIVE MG/DL
BUN SERPL-MCNC: 14 MG/DL (ref 9–20)
CALCIUM SERPL-MCNC: 9.5 MG/DL (ref 8.4–10.2)
CARBAMAZEPINE SERPL-MCNC: < 3 UG/ML (ref 4–12)
COLOR UR: YELLOW
EOSINOPHIL # BLD AUTO: 0.1 K/UL (ref 0–0.7)
EOSINOPHIL NFR BLD: 1.6 % (ref 0–4)
ERYTHROCYTE [DISTWIDTH] IN BLOOD BY AUTOMATED COUNT: 13.5 % (ref 11.5–14.5)
GFR NON-AFRICAN AMERICAN: > 60
GLUCOSE UR STRIP-MCNC: (no result) MG/DL
HGB BLD-MCNC: 13.3 G/DL (ref 12–18)
LEUKOCYTE ESTERASE UR-ACNC: (no result) LEU/UL
LYMPHOCYTES # BLD AUTO: 2.1 K/UL (ref 1–4.3)
LYMPHOCYTES NFR BLD AUTO: 27.8 % (ref 20–40)
MCH RBC QN AUTO: 30.5 PG (ref 27–31)
MCHC RBC AUTO-ENTMCNC: 33.9 G/DL (ref 33–37)
MCV RBC AUTO: 90 FL (ref 80–94)
MONOCYTES # BLD: 0.5 K/UL (ref 0–0.8)
MONOCYTES NFR BLD: 6.3 % (ref 0–10)
NEUTROPHILS # BLD: 4.8 K/UL (ref 1.8–7)
NEUTROPHILS NFR BLD AUTO: 63.4 % (ref 50–75)
NRBC BLD AUTO-RTO: 0 % (ref 0–0)
PH UR STRIP: 6 [PH] (ref 5–8)
PHENYTOIN SERPL SCN-MCNC: 19.9 UG/ML (ref 10–20)
PLATELET # BLD: 220 K/UL (ref 130–400)
PMV BLD AUTO: 8.6 FL (ref 7.2–11.7)
PROT UR STRIP-MCNC: NEGATIVE MG/DL
RBC # BLD AUTO: 4.35 MIL/UL (ref 4.4–5.9)
RBC # UR STRIP: NEGATIVE /UL
SP GR UR STRIP: 1.03 (ref 1–1.03)
SQUAMOUS EPITHIAL: 1 /HPF (ref 0–5)
URINE CLARITY: (no result)
URINE NITRATE: NEGATIVE
UROBILINOGEN UR-MCNC: (no result) MG/DL (ref 0.2–1)
VALPROATE SERPL-MCNC: < 10 UG/ML (ref 50–100)
WBC # BLD AUTO: 7.6 K/UL (ref 4.8–10.8)

## 2018-02-20 PROCEDURE — 99284 EMERGENCY DEPT VISIT MOD MDM: CPT

## 2018-02-20 PROCEDURE — 80185 ASSAY OF PHENYTOIN TOTAL: CPT

## 2018-02-20 PROCEDURE — 96361 HYDRATE IV INFUSION ADD-ON: CPT

## 2018-02-20 PROCEDURE — 80156 ASSAY CARBAMAZEPINE TOTAL: CPT

## 2018-02-20 PROCEDURE — 96367 TX/PROPH/DG ADDL SEQ IV INF: CPT

## 2018-02-20 PROCEDURE — 81003 URINALYSIS AUTO W/O SCOPE: CPT

## 2018-02-20 PROCEDURE — 96375 TX/PRO/DX INJ NEW DRUG ADDON: CPT

## 2018-02-20 PROCEDURE — 96365 THER/PROPH/DIAG IV INF INIT: CPT

## 2018-02-20 PROCEDURE — 82550 ASSAY OF CK (CPK): CPT

## 2018-02-20 PROCEDURE — 85025 COMPLETE CBC W/AUTO DIFF WBC: CPT

## 2018-02-20 PROCEDURE — 80048 BASIC METABOLIC PNL TOTAL CA: CPT

## 2018-02-20 PROCEDURE — 80164 ASSAY DIPROPYLACETIC ACD TOT: CPT

## 2018-02-20 NOTE — ED PDOC
HPI: Seizure


Time Seen by Provider: 02/20/18 22:24


Chief Complaint (Nursing): Seizure


Chief Complaint (Provider): Seizure


History Per: Patient


History/Exam Limitations: no limitations


Number Of Seizures: Multiple (x7)


Associated Symptoms: Incontinence Of Urine (x3).  denies: Bit Tongue


Additional Complaint(s): 





Ismael Yip is a 32 year old male with a history of epilepsy that 

presents to the ED with a chief complaint of 7 seizures today. Patient reports 

that he has been diagnosed with epilepsy for the past 18 years, and that he 

takes Dilantin 200 mg TID, Keppra 1500 mg BID, and Lamictal 200 mg BID. Patient 

reports that he never misses any doses. He states that he is from [place], [place], and is currently visiting a friend, who he states witnessed all 

of his seizures. (Of Note: Friend is not available to ask for the quality of 

seizures). Patient reports that all of his seizures were tonic-clonic, denies 

any tongue biting, and reports that he was incontinent of urine for 3 of the 

seizures. 





Of Note: Patient reports that his neurologist is currently out of town. 





Past Medical History


Reviewed: Historical Data, Nursing Documentation, Vital Signs


Vital Signs: 


 Last Vital Signs











Temp  98.1 F   02/20/18 22:05


 


Pulse  103 H  02/20/18 22:05


 


Resp  18   02/20/18 22:05


 


BP  172/98 H  02/20/18 22:05


 


Pulse Ox  98   02/21/18 05:23














- Medical History


PMH: Seizures





- Family History


Family History: States: Unknown Family Hx





- Social History


Current smoker - smoking cessation education provided: Yes


Alcohol: None


Drugs: Denies





- Immunization History


Hx Tetanus Toxoid Vaccination: No


Hx Influenza Vaccination: No


Hx Pneumococcal Vaccination: No





- Allergies


Allergies/Adverse Reactions: 


 Allergies











Allergy/AdvReac Type Severity Reaction Status Date / Time


 


iodine Allergy  ANAPHYLAXIS Verified 02/20/18 22:05














Review of Systems


ROS Statement: Except As Marked, All Systems Reviewed And Found Negative


Neurological: Positive for: Seizures





Physical Exam





- Reviewed


Nursing Documentation Reviewed: Yes


Vital Signs Reviewed: Yes





- Physical Exam


Appears: Positive for: Non-toxic, No Acute Distress


Head Exam: Positive for: ATRAUMATIC, NORMOCEPHALIC


Skin: Positive for: Normal Color, Warm


Eye Exam: Positive for: Other (Patient has chronic disconjugate gaze and 

chronic blindness in right eye.)


Cardiovascular/Chest: Positive for: Regular Rate, Rhythm.  Negative for: Murmur


Respiratory: Positive for: Normal Breath Sounds.  Negative for: Wheezing


Gastrointestinal/Abdominal: Positive for: Normal Exam, Soft.  Negative for: 

Tenderness


Back: Positive for: Normal Inspection.  Negative for: L CVA Tenderness, R CVA 

Tenderness


Extremity: Positive for: Normal ROM.  Negative for: Deformity, Swelling


Neurologic/Psych: Positive for: Alert, CNs II-XII (Normal), Oriented, 

Cerebellar Tests (Normal), Gait (Steady).  Negative for: Motor/Sensory Deficits

, Aphasia, Facial Droop





- Laboratory Results


Result Diagrams: 


 02/20/18 23:15





 02/20/18 23:15





- ECG


O2 Sat by Pulse Oximetry: 98 (RA)


Pulse Ox Interpretation: Normal





Medical Decision Making


Medical Decision Making: 





Impression: Seizure Disorder; patient states he gets loaded with both cerebyx 

and keppra usually





Plan:


* Cerebryx 1G


* Keppra 1500mg


* NaCl 1000 mLs at 1000 mL/hr


* Reevaluation


3:00


Pt. requested pain medication.  toradol given.  Pt. became irate and started 

cursing at staff saying "this shit doesn't work for me".  Pt. became verbally 

abusive.  Pt. then fell asleep








5:22


Upon provider reevaluation patient is feeling better, is medically stable, and 

requires no further treatment in the ED at this time.   Patient was monitored 

for 7 hours without any seizure like activity.  Patient is stable for 

discharge. Counseling was provided and all questions were answered regarding 

diagnosis and need for follow up. There is agreement to discharge plan. Return 

if symptoms persist or worsen.





Clinical Impression: Seizure disorder


--------------------------------------------------------------------------------

----------------- 


Scribe Attestation:


Documented by Imelda Servin, acting as a scribe for Song Reyes MD.





Provider Scribe Attestation:


All medical record entries made by the Scribe were at my direction and 

personally dictated by me. I have reviewed the chart and agree that the record 

accurately reflects my personal performance of the history, physical exam, 

medical decision making, and the department course for this patient. I have 

also personally directed, reviewed, and agree with the discharge instructions 

and disposition.





Disposition





- Clinical Impression


Clinical Impression: 


 Seizure disorder








- Patient ED Disposition


Is Patient to be Admitted: No





- Disposition


Referrals: 


[facility] [Outside]


Disposition: Routine/Home


Disposition Time: 05:22


Condition: STABLE


Instructions:  Epilepsy in Adults


Forms:  [employer] (English)

## 2018-07-26 ENCOUNTER — HOSPITAL ENCOUNTER (EMERGENCY)
Age: 33
Discharge: HOME | End: 2018-07-26
Attending: EMERGENCY MEDICINE | Admitting: EMERGENCY MEDICINE
Payer: MEDICAID

## 2018-07-26 VITALS
HEIGHT: 69 IN | SYSTOLIC BLOOD PRESSURE: 124 MMHG | DIASTOLIC BLOOD PRESSURE: 77 MMHG | HEART RATE: 77 BPM | RESPIRATION RATE: 16 BRPM | OXYGEN SATURATION: 95 % | BODY MASS INDEX: 38.51 KG/M2 | TEMPERATURE: 97.7 F | WEIGHT: 260 LBS

## 2018-07-26 DIAGNOSIS — Z79.899 SEIZURE SECONDARY TO SUBTHERAPEUTIC ANTICONVULSANT MEDICATION (HCC): ICD-10-CM

## 2018-07-26 DIAGNOSIS — R56.9 SEIZURE SECONDARY TO SUBTHERAPEUTIC ANTICONVULSANT MEDICATION (HCC): ICD-10-CM

## 2018-07-26 DIAGNOSIS — F12.90 MARIJUANA USE: ICD-10-CM

## 2018-07-26 DIAGNOSIS — F17.200 SMOKER: ICD-10-CM

## 2018-07-26 DIAGNOSIS — Z51.81 SUBTHERAPEUTIC PHENYTOIN LEVEL: ICD-10-CM

## 2018-07-26 DIAGNOSIS — Z91.14 NONCOMPLIANCE WITH MEDICATION REGIMEN: ICD-10-CM

## 2018-07-26 DIAGNOSIS — G40.909 SEIZURE DISORDER (HCC): Primary | ICD-10-CM

## 2018-07-26 LAB
ALBUMIN SERPL-MCNC: 4 G/DL (ref 3.5–5)
ALBUMIN/GLOB SERPL: 1.3 {RATIO} (ref 1.1–2.2)
ALP SERPL-CCNC: 101 U/L (ref 45–117)
ALT SERPL-CCNC: 46 U/L (ref 12–78)
AMPHET UR QL SCN: NEGATIVE
ANION GAP SERPL CALC-SCNC: 7 MMOL/L (ref 5–15)
APPEARANCE UR: CLEAR
AST SERPL-CCNC: 19 U/L (ref 15–37)
ATRIAL RATE: 95 BPM
BACTERIA URNS QL MICRO: NEGATIVE /HPF
BARBITURATES UR QL SCN: NEGATIVE
BASOPHILS # BLD: 0.1 K/UL (ref 0–0.1)
BASOPHILS NFR BLD: 1 % (ref 0–1)
BENZODIAZ UR QL: NEGATIVE
BILIRUB SERPL-MCNC: 0.2 MG/DL (ref 0.2–1)
BILIRUB UR QL: NEGATIVE
BUN SERPL-MCNC: 11 MG/DL (ref 6–20)
BUN/CREAT SERPL: 13 (ref 12–20)
CALCIUM SERPL-MCNC: 9.2 MG/DL (ref 8.5–10.1)
CALCULATED P AXIS, ECG09: 55 DEGREES
CALCULATED R AXIS, ECG10: 32 DEGREES
CALCULATED T AXIS, ECG11: 5 DEGREES
CANNABINOIDS UR QL SCN: POSITIVE
CAOX CRY URNS QL MICRO: ABNORMAL
CHLORIDE SERPL-SCNC: 108 MMOL/L (ref 97–108)
CO2 SERPL-SCNC: 27 MMOL/L (ref 21–32)
COCAINE UR QL SCN: NEGATIVE
COLOR UR: ABNORMAL
CREAT SERPL-MCNC: 0.83 MG/DL (ref 0.7–1.3)
DIAGNOSIS, 93000: NORMAL
DIFFERENTIAL METHOD BLD: ABNORMAL
DRUG SCRN COMMENT,DRGCM: ABNORMAL
EOSINOPHIL # BLD: 0.3 K/UL (ref 0–0.4)
EOSINOPHIL NFR BLD: 2 % (ref 0–7)
EPITH CASTS URNS QL MICRO: ABNORMAL /LPF
ERYTHROCYTE [DISTWIDTH] IN BLOOD BY AUTOMATED COUNT: 13.7 % (ref 11.5–14.5)
GLOBULIN SER CALC-MCNC: 3.2 G/DL (ref 2–4)
GLUCOSE SERPL-MCNC: 122 MG/DL (ref 65–100)
GLUCOSE UR STRIP.AUTO-MCNC: NEGATIVE MG/DL
HCT VFR BLD AUTO: 42.2 % (ref 36.6–50.3)
HGB BLD-MCNC: 14.1 G/DL (ref 12.1–17)
HGB UR QL STRIP: NEGATIVE
IMM GRANULOCYTES # BLD: 0 K/UL (ref 0–0.04)
IMM GRANULOCYTES NFR BLD AUTO: 0 % (ref 0–0.5)
KETONES UR QL STRIP.AUTO: ABNORMAL MG/DL
LACTATE SERPL-SCNC: 1.1 MMOL/L (ref 0.4–2)
LEUKOCYTE ESTERASE UR QL STRIP.AUTO: NEGATIVE
LYMPHOCYTES # BLD: 2.9 K/UL (ref 0.8–3.5)
LYMPHOCYTES NFR BLD: 26 % (ref 12–49)
MCH RBC QN AUTO: 29.2 PG (ref 26–34)
MCHC RBC AUTO-ENTMCNC: 33.4 G/DL (ref 30–36.5)
MCV RBC AUTO: 87.4 FL (ref 80–99)
METHADONE UR QL: NEGATIVE
MONOCYTES # BLD: 0.7 K/UL (ref 0–1)
MONOCYTES NFR BLD: 6 % (ref 5–13)
MUCOUS THREADS URNS QL MICRO: ABNORMAL /LPF
NEUTS SEG # BLD: 7.3 K/UL (ref 1.8–8)
NEUTS SEG NFR BLD: 65 % (ref 32–75)
NITRITE UR QL STRIP.AUTO: NEGATIVE
NRBC # BLD: 0 K/UL (ref 0–0.01)
NRBC BLD-RTO: 0 PER 100 WBC
OPIATES UR QL: NEGATIVE
P-R INTERVAL, ECG05: 142 MS
PCP UR QL: NEGATIVE
PH UR STRIP: 6 [PH] (ref 5–8)
PHENYTOIN SERPL-MCNC: 0.6 UG/ML (ref 10–20)
PLATELET # BLD AUTO: 280 K/UL (ref 150–400)
PMV BLD AUTO: 10.9 FL (ref 8.9–12.9)
POTASSIUM SERPL-SCNC: 3.9 MMOL/L (ref 3.5–5.1)
PROT SERPL-MCNC: 7.2 G/DL (ref 6.4–8.2)
PROT UR STRIP-MCNC: NEGATIVE MG/DL
Q-T INTERVAL, ECG07: 342 MS
QRS DURATION, ECG06: 90 MS
QTC CALCULATION (BEZET), ECG08: 429 MS
RBC # BLD AUTO: 4.83 M/UL (ref 4.1–5.7)
RBC #/AREA URNS HPF: ABNORMAL /HPF (ref 0–5)
SODIUM SERPL-SCNC: 142 MMOL/L (ref 136–145)
SP GR UR REFRACTOMETRY: 1.02 (ref 1–1.03)
UA: UC IF INDICATED,UAUC: ABNORMAL
UROBILINOGEN UR QL STRIP.AUTO: 0.2 EU/DL (ref 0.2–1)
VENTRICULAR RATE, ECG03: 95 BPM
WBC # BLD AUTO: 11.3 K/UL (ref 4.1–11.1)
WBC URNS QL MICRO: ABNORMAL /HPF (ref 0–4)

## 2018-07-26 PROCEDURE — 81001 URINALYSIS AUTO W/SCOPE: CPT | Performed by: EMERGENCY MEDICINE

## 2018-07-26 PROCEDURE — 74011250636 HC RX REV CODE- 250/636: Performed by: EMERGENCY MEDICINE

## 2018-07-26 PROCEDURE — 80177 DRUG SCRN QUAN LEVETIRACETAM: CPT | Performed by: EMERGENCY MEDICINE

## 2018-07-26 PROCEDURE — 80185 ASSAY OF PHENYTOIN TOTAL: CPT | Performed by: EMERGENCY MEDICINE

## 2018-07-26 PROCEDURE — 83605 ASSAY OF LACTIC ACID: CPT | Performed by: EMERGENCY MEDICINE

## 2018-07-26 PROCEDURE — 85025 COMPLETE CBC W/AUTO DIFF WBC: CPT | Performed by: EMERGENCY MEDICINE

## 2018-07-26 PROCEDURE — 80053 COMPREHEN METABOLIC PANEL: CPT | Performed by: EMERGENCY MEDICINE

## 2018-07-26 PROCEDURE — 96361 HYDRATE IV INFUSION ADD-ON: CPT

## 2018-07-26 PROCEDURE — 36415 COLL VENOUS BLD VENIPUNCTURE: CPT | Performed by: EMERGENCY MEDICINE

## 2018-07-26 PROCEDURE — 80307 DRUG TEST PRSMV CHEM ANLYZR: CPT | Performed by: EMERGENCY MEDICINE

## 2018-07-26 PROCEDURE — 74011000258 HC RX REV CODE- 258: Performed by: EMERGENCY MEDICINE

## 2018-07-26 PROCEDURE — 93005 ELECTROCARDIOGRAM TRACING: CPT

## 2018-07-26 PROCEDURE — 96365 THER/PROPH/DIAG IV INF INIT: CPT

## 2018-07-26 PROCEDURE — 80175 DRUG SCREEN QUAN LAMOTRIGINE: CPT | Performed by: EMERGENCY MEDICINE

## 2018-07-26 PROCEDURE — 99285 EMERGENCY DEPT VISIT HI MDM: CPT

## 2018-07-26 PROCEDURE — 74011250637 HC RX REV CODE- 250/637: Performed by: EMERGENCY MEDICINE

## 2018-07-26 RX ORDER — LAMOTRIGINE 100 MG/1
100 TABLET ORAL DAILY
Status: DISCONTINUED | OUTPATIENT
Start: 2018-07-26 | End: 2018-07-26 | Stop reason: HOSPADM

## 2018-07-26 RX ORDER — TOPIRAMATE 100 MG/1
TABLET, FILM COATED ORAL 2 TIMES DAILY
COMMUNITY
End: 2018-07-26

## 2018-07-26 RX ORDER — LEVETIRACETAM 500 MG/1
1000 TABLET ORAL
Status: COMPLETED | OUTPATIENT
Start: 2018-07-26 | End: 2018-07-26

## 2018-07-26 RX ORDER — LEVETIRACETAM 750 MG/1
1500 TABLET ORAL 2 TIMES DAILY
Qty: 120 TAB | Refills: 0 | Status: SHIPPED | OUTPATIENT
Start: 2018-07-26 | End: 2018-08-25

## 2018-07-26 RX ORDER — TOPIRAMATE 100 MG/1
100 TABLET, FILM COATED ORAL 2 TIMES DAILY
Qty: 60 TAB | Refills: 0 | Status: SHIPPED | OUTPATIENT
Start: 2018-07-26

## 2018-07-26 RX ORDER — LEVETIRACETAM 500 MG/1
500 TABLET ORAL
Status: COMPLETED | OUTPATIENT
Start: 2018-07-26 | End: 2018-07-26

## 2018-07-26 RX ORDER — PHENYTOIN SODIUM 100 MG/1
200 CAPSULE, EXTENDED RELEASE ORAL 3 TIMES DAILY
Qty: 180 CAP | Refills: 0 | Status: SHIPPED | OUTPATIENT
Start: 2018-07-26 | End: 2018-08-25

## 2018-07-26 RX ORDER — LAMOTRIGINE 100 MG/1
100 TABLET ORAL
Status: COMPLETED | OUTPATIENT
Start: 2018-07-26 | End: 2018-07-26

## 2018-07-26 RX ORDER — LAMOTRIGINE 100 MG/1
100 TABLET ORAL DAILY
Status: DISCONTINUED | OUTPATIENT
Start: 2018-07-26 | End: 2018-07-26

## 2018-07-26 RX ORDER — LAMOTRIGINE 200 MG/1
200 TABLET ORAL 2 TIMES DAILY
Qty: 60 TAB | Refills: 0 | Status: SHIPPED | OUTPATIENT
Start: 2018-07-26

## 2018-07-26 RX ADMIN — SODIUM CHLORIDE 1000 ML: 900 INJECTION, SOLUTION INTRAVENOUS at 02:55

## 2018-07-26 RX ADMIN — LAMOTRIGINE 100 MG: 100 TABLET ORAL at 05:46

## 2018-07-26 RX ADMIN — LEVETIRACETAM 1000 MG: 500 TABLET ORAL at 05:00

## 2018-07-26 RX ADMIN — LEVETIRACETAM 500 MG: 500 TABLET ORAL at 05:46

## 2018-07-26 RX ADMIN — LAMOTRIGINE 100 MG: 100 TABLET ORAL at 05:00

## 2018-07-26 RX ADMIN — PHENYTOIN SODIUM 1000 MG: 50 INJECTION INTRAMUSCULAR; INTRAVENOUS at 05:00

## 2018-07-26 NOTE — ED NOTES
.Discharge instructions reviewed with patient and given to pt per MD lenz. Pt able to return/verbalize discharge instructions. Copy of discharge instructions given. RX given to pt. Pt condition stable, no further complaints. Pt out of ER, accompanied by self. Ambulatory, steady gait. Wheelchair offered & pt declined. Belongings & discharge paperwork out of ED with patient. Pt stated he is going to wait for ride. Pt educated on temporary floor of ED, as lobby was being cleaned at this time. Pt to wait for ride in rooms 2 or 4. Pt began to yell and become agitated at staff. [facility] [employer] pays for me to wait in the lobby. This is ridiculous! i've been here for a long time. 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 I'm going to call my fiance to find out how long before he gets here because this is ridiculous. Every time I speak, I've gotten rebuttal from someone, not one particular person but I want to talk to the charge nurse. No one is letting me speak. \"     8143: Pt left Room 18, demanding to talk to charge nurse. \"i'm going to step out and make this call. \" Staff directed him out the EMS door, as that is the door to use at this time. Charge nurse called-will come to speak with patient shortly. 2020: Pt has not returned to speak with charge nurse.

## 2018-07-26 NOTE — ED NOTES
Bedside and Verbal shift change report given to JR Quiroz (oncoming nurse) by Guanako Pizarro (offgoing nurse). Report included the following information SBAR, ED Summary, Intake/Output, MAR and Recent Results. Resting at this time, given pillow for comfort.

## 2018-07-26 NOTE — ED NOTES
Patient notes the dosages on his meds are not right & talked with Dr Tara Ramires about what hw was scheduled to take @ present & she will adjust the meds
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
## 2018-07-26 NOTE — ED TRIAGE NOTES
Neuro MD Bony Slaughter- last seen 7 months ago. Last reported seizure a week ago per patient. From [place], lives in [address] now.

## 2018-07-26 NOTE — DISCHARGE INSTRUCTIONS
Seizure: Care Instructions  Your Care Instructions    Seizures are caused by abnormal patterns of electrical signals in the brain. They are different for each person. Seizures can affect movement, speech, vision, or awareness. Some people have only slight shaking of a hand and do not pass out. Other people may pass out and have violent shaking of the whole body. Some people appear to stare into space. They are awake, but they can't respond normally. Later, they may not remember what happened. You may need tests to identify the type and cause of the seizures. A seizure may occur only once, or you may have them more than one time. Taking medicines as directed and following up with your doctor may help keep you from having more seizures. The doctor has checked you carefully, but problems can develop later. If you notice any problems or new symptoms, get medical treatment right away. Follow-up care is a key part of your treatment and safety. Be sure to make and go to all appointments, and call your doctor if you are having problems. It's also a good idea to know your test results and keep a list of the medicines you take. How can you care for yourself at home? · Be safe with medicines. Take your medicines exactly as prescribed. Call your doctor if you think you are having a problem with your medicine. · Do not do any activity that could be dangerous to you or others until your doctor says it is safe to do so. For example, do not drive a car, operate machinery, swim, or climb ladders. · Be sure that anyone treating you for any health problem knows that you have had a seizure and what medicines you are taking for it. · Identify and avoid things that may make you more likely to have a seizure. These may include lack of sleep, alcohol or drug use, stress, or not eating. · Make sure you go to your follow-up appointment. When should you call for help? Call 911 anytime you think you may need emergency care. For example, call if:    · You have another seizure.     · You have more than one seizure in 24 hours.     · You have new symptoms, such as trouble walking, speaking, or thinking clearly.    Call your doctor now or seek immediate medical care if:    · You are not acting normally.    Watch closely for changes in your health, and be sure to contact your doctor if you have any problems. Where can you learn more? Go to http://geeta-dana.info/. Enter J318 in the search box to learn more about \"Seizure: Care Instructions. \"  Current as of: October 9, 2017  Content Version: 11.7  © 8657-7842 [employer]. Care instructions adapted under license by [employer] (which disclaims liability or warranty for this information). If you have questions about a medical condition or this instruction, always ask your healthcare professional. [address] any warranty or liability for your use of this information. Marijuana Use: Care Instructions  Your Care Instructions  During your exam, traces of marijuana were found in your body. The active chemical in marijuana is THC. THC usually can be found in urine for a few days after marijuana is used. If use is heavy, THC may be found for weeks after use has stopped. In the [place], marijuana laws vary widely. The drug is legal in some states, mainly as a medical treatment. But marijuana possession is still a crime under federal law. Many employers have strict rules about drug use. Many do drug testing. A positive drug test might cause you to lose your job. Or it might keep you from getting hired. Follow-up care is a key part of your treatment and safety. Be sure to make and go to all appointments, and call your doctor if you are having problems. It's also a good idea to know your test results and keep a list of the medicines you take. How can you care for yourself at home?   · If you use marijuana, use it safely. Do not drive or operate heavy equipment. Using marijuana may affect your judgment and coordination. It can also increase your risk of being in a car crash. · Make sure you understand the laws in your area. Using marijuana may cause legal problems. · Know your employer's policies. When should you call for help? Watch closely for changes in your health, and contact your doctor if you think you have a problem with marijuana use. Where can you learn more? Go to http://geeta-dana.info/. Enter F199 in the search box to learn more about \"Marijuana Use: Care Instructions. \"  Current as of: October 9, 2017  Content Version: 11.7  © 5645-9864 [employer]. Care instructions adapted under license by [employer] (which disclaims liability or warranty for this information). If you have questions about a medical condition or this instruction, always ask your healthcare professional. [place] [address] any warranty or liability for your use of this information.

## 2018-07-26 NOTE — ED NOTES
Pt in hallway bedside security yelling that he requested ride and no one is willing to help, transportation set up for patient.  Confirmation # [id number] eta 1 hr

## 2018-07-27 LAB
LAMOTRIGINE SERPL-MCNC: NORMAL UG/ML (ref 2–20)
LEVETIRACETAM SERPL-MCNC: NORMAL UG/ML (ref 10–40)

## 2018-10-07 ENCOUNTER — APPOINTMENT (OUTPATIENT)
Dept: CT IMAGING | Age: 33
End: 2018-10-07
Attending: HOSPITALIST
Payer: SELF-PAY

## 2018-10-07 ENCOUNTER — HOSPITAL ENCOUNTER (OUTPATIENT)
Age: 33
Setting detail: OBSERVATION
Discharge: LEFT BEFORE DISCHARGE | End: 2018-10-07
Attending: EMERGENCY MEDICINE | Admitting: HOSPITALIST
Payer: SELF-PAY

## 2018-10-07 ENCOUNTER — HOSPITAL ENCOUNTER (EMERGENCY)
Age: 33
Discharge: LEFT BEFORE DISCHARGE | End: 2018-10-07
Attending: EMERGENCY MEDICINE
Payer: SELF-PAY

## 2018-10-07 VITALS
OXYGEN SATURATION: 99 % | HEART RATE: 77 BPM | RESPIRATION RATE: 18 BRPM | BODY MASS INDEX: 36.69 KG/M2 | DIASTOLIC BLOOD PRESSURE: 78 MMHG | TEMPERATURE: 98.6 F | SYSTOLIC BLOOD PRESSURE: 149 MMHG | WEIGHT: 248.46 LBS

## 2018-10-07 DIAGNOSIS — G40.901 STATUS EPILEPTICUS (HCC): Primary | ICD-10-CM

## 2018-10-07 DIAGNOSIS — R56.9 SEIZURES (HCC): Primary | ICD-10-CM

## 2018-10-07 DIAGNOSIS — R56.9 SEIZURE (HCC): ICD-10-CM

## 2018-10-07 LAB
ALBUMIN SERPL-MCNC: 3.8 G/DL (ref 3.5–5)
ALBUMIN/GLOB SERPL: 1.2 {RATIO} (ref 1.1–2.2)
ALP SERPL-CCNC: 96 U/L (ref 45–117)
ALT SERPL-CCNC: 30 U/L (ref 12–78)
AMPHET UR QL SCN: NEGATIVE
ANION GAP SERPL CALC-SCNC: 11 MMOL/L (ref 5–15)
AST SERPL-CCNC: 24 U/L (ref 15–37)
BARBITURATES UR QL SCN: NEGATIVE
BASOPHILS # BLD: 0 K/UL (ref 0–0.1)
BASOPHILS NFR BLD: 0 % (ref 0–1)
BENZODIAZ UR QL: NEGATIVE
BILIRUB SERPL-MCNC: 0.4 MG/DL (ref 0.2–1)
BUN SERPL-MCNC: 10 MG/DL (ref 6–20)
BUN/CREAT SERPL: 10 (ref 12–20)
CALCIUM SERPL-MCNC: 8.8 MG/DL (ref 8.5–10.1)
CANNABINOIDS UR QL SCN: POSITIVE
CHLORIDE SERPL-SCNC: 107 MMOL/L (ref 97–108)
CO2 SERPL-SCNC: 24 MMOL/L (ref 21–32)
COCAINE UR QL SCN: NEGATIVE
COMMENT, HOLDF: NORMAL
CREAT SERPL-MCNC: 0.96 MG/DL (ref 0.7–1.3)
DIFFERENTIAL METHOD BLD: NORMAL
DRUG SCRN COMMENT,DRGCM: ABNORMAL
EOSINOPHIL # BLD: 0.2 K/UL (ref 0–0.4)
EOSINOPHIL NFR BLD: 2 % (ref 0–7)
ERYTHROCYTE [DISTWIDTH] IN BLOOD BY AUTOMATED COUNT: 13 % (ref 11.5–14.5)
ETHANOL SERPL-MCNC: <10 MG/DL
GLOBULIN SER CALC-MCNC: 3.3 G/DL (ref 2–4)
GLUCOSE SERPL-MCNC: 130 MG/DL (ref 65–100)
HCT VFR BLD AUTO: 39.5 % (ref 36.6–50.3)
HGB BLD-MCNC: 13.2 G/DL (ref 12.1–17)
IMM GRANULOCYTES # BLD: 0 K/UL (ref 0–0.04)
IMM GRANULOCYTES NFR BLD AUTO: 0 % (ref 0–0.5)
LYMPHOCYTES # BLD: 2.3 K/UL (ref 0.8–3.5)
LYMPHOCYTES NFR BLD: 22 % (ref 12–49)
MCH RBC QN AUTO: 29.7 PG (ref 26–34)
MCHC RBC AUTO-ENTMCNC: 33.4 G/DL (ref 30–36.5)
MCV RBC AUTO: 89 FL (ref 80–99)
METHADONE UR QL: NEGATIVE
MONOCYTES # BLD: 0.6 K/UL (ref 0–1)
MONOCYTES NFR BLD: 5 % (ref 5–13)
NEUTS SEG # BLD: 7.2 K/UL (ref 1.8–8)
NEUTS SEG NFR BLD: 70 % (ref 32–75)
NRBC # BLD: 0 K/UL (ref 0–0.01)
NRBC BLD-RTO: 0 PER 100 WBC
OPIATES UR QL: POSITIVE
PCP UR QL: NEGATIVE
PLATELET # BLD AUTO: 220 K/UL (ref 150–400)
PMV BLD AUTO: 11.4 FL (ref 8.9–12.9)
POTASSIUM SERPL-SCNC: 3.3 MMOL/L (ref 3.5–5.1)
PROT SERPL-MCNC: 7.1 G/DL (ref 6.4–8.2)
RBC # BLD AUTO: 4.44 M/UL (ref 4.1–5.7)
SAMPLES BEING HELD,HOLD: NORMAL
SODIUM SERPL-SCNC: 142 MMOL/L (ref 136–145)
WBC # BLD AUTO: 10.3 K/UL (ref 4.1–11.1)

## 2018-10-07 PROCEDURE — 74011000258 HC RX REV CODE- 258: Performed by: NURSE PRACTITIONER

## 2018-10-07 PROCEDURE — 94760 N-INVAS EAR/PLS OXIMETRY 1: CPT

## 2018-10-07 PROCEDURE — 96375 TX/PRO/DX INJ NEW DRUG ADDON: CPT

## 2018-10-07 PROCEDURE — 75810000275 HC EMERGENCY DEPT VISIT NO LEVEL OF CARE

## 2018-10-07 PROCEDURE — 80053 COMPREHEN METABOLIC PANEL: CPT | Performed by: EMERGENCY MEDICINE

## 2018-10-07 PROCEDURE — 96365 THER/PROPH/DIAG IV INF INIT: CPT

## 2018-10-07 PROCEDURE — 80307 DRUG TEST PRSMV CHEM ANLYZR: CPT | Performed by: EMERGENCY MEDICINE

## 2018-10-07 PROCEDURE — 74011250637 HC RX REV CODE- 250/637: Performed by: HOSPITALIST

## 2018-10-07 PROCEDURE — 74011250636 HC RX REV CODE- 250/636: Performed by: EMERGENCY MEDICINE

## 2018-10-07 PROCEDURE — 99218 HC RM OBSERVATION: CPT

## 2018-10-07 PROCEDURE — 96361 HYDRATE IV INFUSION ADD-ON: CPT

## 2018-10-07 PROCEDURE — 74011250637 HC RX REV CODE- 250/637: Performed by: EMERGENCY MEDICINE

## 2018-10-07 PROCEDURE — 70450 CT HEAD/BRAIN W/O DYE: CPT

## 2018-10-07 PROCEDURE — 74011250636 HC RX REV CODE- 250/636: Performed by: HOSPITALIST

## 2018-10-07 PROCEDURE — 74011250636 HC RX REV CODE- 250/636: Performed by: NURSE PRACTITIONER

## 2018-10-07 PROCEDURE — 36415 COLL VENOUS BLD VENIPUNCTURE: CPT | Performed by: EMERGENCY MEDICINE

## 2018-10-07 PROCEDURE — 74011000258 HC RX REV CODE- 258: Performed by: EMERGENCY MEDICINE

## 2018-10-07 PROCEDURE — 85025 COMPLETE CBC W/AUTO DIFF WBC: CPT | Performed by: EMERGENCY MEDICINE

## 2018-10-07 PROCEDURE — 80307 DRUG TEST PRSMV CHEM ANLYZR: CPT | Performed by: HOSPITALIST

## 2018-10-07 PROCEDURE — 99285 EMERGENCY DEPT VISIT HI MDM: CPT

## 2018-10-07 RX ORDER — FENTANYL CITRATE 50 UG/ML
25 INJECTION, SOLUTION INTRAMUSCULAR; INTRAVENOUS
Status: DISCONTINUED | OUTPATIENT
Start: 2018-10-07 | End: 2018-10-07 | Stop reason: HOSPADM

## 2018-10-07 RX ORDER — IBUPROFEN 200 MG
1 TABLET ORAL EVERY 24 HOURS
Status: DISCONTINUED | OUTPATIENT
Start: 2018-10-07 | End: 2018-10-07 | Stop reason: HOSPADM

## 2018-10-07 RX ORDER — NYSTATIN 100000 U/G
CREAM TOPICAL 2 TIMES DAILY
Status: DISCONTINUED | OUTPATIENT
Start: 2018-10-07 | End: 2018-10-07 | Stop reason: HOSPADM

## 2018-10-07 RX ORDER — LAMOTRIGINE 100 MG/1
200 TABLET ORAL 2 TIMES DAILY
Status: DISCONTINUED | OUTPATIENT
Start: 2018-10-07 | End: 2018-10-07 | Stop reason: HOSPADM

## 2018-10-07 RX ORDER — ACETAMINOPHEN 325 MG/1
650 TABLET ORAL
Status: DISCONTINUED | OUTPATIENT
Start: 2018-10-07 | End: 2018-10-07 | Stop reason: HOSPADM

## 2018-10-07 RX ORDER — HYDROCODONE BITARTRATE AND ACETAMINOPHEN 5; 325 MG/1; MG/1
1 TABLET ORAL
Status: DISCONTINUED | OUTPATIENT
Start: 2018-10-07 | End: 2018-10-07 | Stop reason: HOSPADM

## 2018-10-07 RX ORDER — POTASSIUM CHLORIDE 750 MG/1
40 TABLET, FILM COATED, EXTENDED RELEASE ORAL
Status: COMPLETED | OUTPATIENT
Start: 2018-10-07 | End: 2018-10-07

## 2018-10-07 RX ORDER — SODIUM CHLORIDE 0.9 % (FLUSH) 0.9 %
5-10 SYRINGE (ML) INJECTION EVERY 8 HOURS
Status: DISCONTINUED | OUTPATIENT
Start: 2018-10-07 | End: 2018-10-07 | Stop reason: HOSPADM

## 2018-10-07 RX ORDER — LORAZEPAM 2 MG/ML
1 INJECTION INTRAMUSCULAR
Status: DISCONTINUED | OUTPATIENT
Start: 2018-10-07 | End: 2018-10-07

## 2018-10-07 RX ORDER — LORAZEPAM 2 MG/ML
2 INJECTION INTRAMUSCULAR
Status: COMPLETED | OUTPATIENT
Start: 2018-10-07 | End: 2018-10-07

## 2018-10-07 RX ORDER — CYCLOBENZAPRINE HCL 10 MG
10 TABLET ORAL
Status: DISCONTINUED | OUTPATIENT
Start: 2018-10-07 | End: 2018-10-07 | Stop reason: HOSPADM

## 2018-10-07 RX ORDER — PHENYTOIN 50 MG/1
200 TABLET, CHEWABLE ORAL 3 TIMES DAILY
Status: DISCONTINUED | OUTPATIENT
Start: 2018-10-07 | End: 2018-10-07 | Stop reason: HOSPADM

## 2018-10-07 RX ORDER — SODIUM CHLORIDE 0.9 % (FLUSH) 0.9 %
5-10 SYRINGE (ML) INJECTION AS NEEDED
Status: DISCONTINUED | OUTPATIENT
Start: 2018-10-07 | End: 2018-10-07 | Stop reason: HOSPADM

## 2018-10-07 RX ORDER — LORAZEPAM 2 MG/ML
1 INJECTION INTRAMUSCULAR
Status: DISCONTINUED | OUTPATIENT
Start: 2018-10-07 | End: 2018-10-07 | Stop reason: HOSPADM

## 2018-10-07 RX ADMIN — HYDROCODONE BITARTRATE AND ACETAMINOPHEN 1 TABLET: 5; 325 TABLET ORAL at 19:01

## 2018-10-07 RX ADMIN — SODIUM CHLORIDE 1000 MG: 900 INJECTION, SOLUTION INTRAVENOUS at 15:45

## 2018-10-07 RX ADMIN — LORAZEPAM 2 MG: 2 INJECTION INTRAMUSCULAR; INTRAVENOUS at 09:31

## 2018-10-07 RX ADMIN — SODIUM CHLORIDE 1500 MG: 900 INJECTION, SOLUTION INTRAVENOUS at 10:48

## 2018-10-07 RX ADMIN — LAMOTRIGINE 200 MG: 100 TABLET ORAL at 19:01

## 2018-10-07 RX ADMIN — SODIUM CHLORIDE 2250 MG PE: 9 INJECTION, SOLUTION INTRAVENOUS at 09:38

## 2018-10-07 RX ADMIN — FENTANYL CITRATE 25 MCG: 50 INJECTION, SOLUTION INTRAMUSCULAR; INTRAVENOUS at 15:46

## 2018-10-07 RX ADMIN — SODIUM CHLORIDE 1000 ML: 900 INJECTION, SOLUTION INTRAVENOUS at 09:30

## 2018-10-07 RX ADMIN — POTASSIUM CHLORIDE 40 MEQ: 750 TABLET, FILM COATED, EXTENDED RELEASE ORAL at 10:44

## 2018-10-07 RX ADMIN — Medication 5 ML: at 14:00
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
## 2018-10-07 NOTE — ROUTINE PROCESS
TRANSFER - OUT REPORT: 
 
Verbal report given to Francia Phelps RN(name) on Airam Charles  being transferred to ICU(unit) for routine progression of care Report consisted of patients Situation, Background, Assessment and  
Recommendations(SBAR). Information from the following report(s) SBAR, ED Summary and Recent Results was reviewed with the receiving nurse. Lines:  
Peripheral IV 10/07/18 Right Antecubital (Active) Opportunity for questions and clarification was provided. Patient transported with: 
Monitor, RN, Officer

## 2018-10-07 NOTE — H&P
[address] HISTORY AND PHYSICAL Louise Gomez 
MR#: [id number] : 1985 ACCOUNT #: [de-identified] ADMIT DATE: 10/07/2018 PRIMARY CARE PHYSICIAN:  [place]. REASON FOR  ADMISSION:  ? seizures. HISTORY OF PRESENT ILLNESS:  Patient is a pleasant 27-year-old  gentleman with past medical history significant for seizure disorder, hypertension, bipolar disorder, anxiety, depression, who was in his usual state of health yesterday morning. He said he went into [facility]. At that time, he was given Keppra as well as Robaxin for his complaints and was discharged to home from the ER. I have reviewed the discharge paperwork that he brought in from [facility]. He reports that he went home and he has not filled up his prescriptions, which were given to him. He was still feeling anxious that he was going to have a seizure and came into the emergency room. In the ER, the ER doctor has reviewed the case and given the patient Keppra as well as fosphenytoin bolus loading and requested hospitalist to admit the patient for observation. The patient himself  when I went to see him. Denied any seizures at this time. He reports that he thinks he might have had seizures. He has a  who he thinks might have seen him, but I do not have the spouse at his bedside to verify if he already had a seizure. The patient himself denies that he could remember any seizure, but he thinks he might have had a seizure as mentioned earlier. He denies any fevers or chills. As mentioned earlier, he has not taken any seizure medicines and he still has his prescriptions with him on hand. He reports that he had electroconvulsive therapy at [facility] in the past and has not been on any seizure medicine since then. When I saw the patient, he had a foul smelling odor like somebody who has not taken a shower for a few days now.   He also reported that he was having itching around his groin area, but he was refusing examination of that area. He denies any fevers or chills. He denies any bloody stool. Denies hematemesis or hematochezia. He reports that he has a neurologist locally but he cannot remember the name. In the ER, he was loaded up with Keppra and fosphenytoin and was referred to hospitalist for admission. PAST MEDICAL HISTORY:  As mentioned, seizure disorder, anxiety, bipolar disorder,  hypertension. ALLERGIES:  THE PATIENT REPORTS THAT HE IS ALLERGIC TO IODINE AND SHELLFISH. HOME MEDICATIONS:  As per my review includes Keppra as well as Robaxin. ALLERGIES:  I MENTIONED EARLIER. FAMILY HISTORY:  Father had depression and cancer. PAST SURGICAL HISTORY:  He reports that he had a history of right frontal lobectomy. SOCIAL HISTORY:  He smokes cigarettes. Denies illicit drug use. Denies alcohol use. Lives at home with his . REVIEW OF SYSTEMS: 
HEENT:  He is positive for seizure and headaches. Denies any postictal paralysis. RESPIRATORY:  No cough, no shortness of breath, no hemoptysis. CARDIOVASCULAR:  No chest pain, no orthopnea, no palpitations. GASTROINTESTINAL:  No nausea, vomiting, no diarrhea, no constipation. GENITOURINARY:  No dysuria, no urgency, no frequency. All other review of systems have been performed and are negative. PHYSICAL EXAMINATION: 
VITAL SIGNS:  Show temperature 98.2, pulse rate 83, respiratory rate of 16, blood pressure 144/87, O2 sats are 98% on room air. GENERAL:  Reveals obese 27-year-old  gentleman, unkempt looking. He appeared to be in mild distress. HEENT:  Normocephalic, atraumatic. Craniotomy scar noted. EYES:  Normal movement. Extraocular movements intact. EARS:  Normal with no evidence of drainage. NOSE:  No deformity, no drainage. MOUTH:  No visible oral lesion. NECK:  Supple, no jugular venous distention. CHEST:  Clear breath sounds. No wheezing, no crackles. HEART:  S1, S2 regular. No appreciable murmur. ABDOMEN:  Soft, nontender, normal bowel sounds. NEUROLOGIC:  Cranial nerve system:  Alert, oriented x3. No gross focal neurologic  deficits. EXTREMITIES:  No edema. Pulses 2+ bilaterally. MUSCULOSKELETAL:  No gross joint deformity or swelling. SKIN:  No lesions noted on his exposed part of the body. GENITOURINARY:  As mentioned earlier, he is refusing examination of genitourinary area even after offering a chaperone. PSYCHIATRIC:  Normal mood and affect. LYMPHATIC:  No cervical lymphadenopathy. LABORATORY DATA:  In the ER were white cells 10,000, hemoglobin 13.2, hematocrit 39.5, platelets 516. Sodium was 142, potassium was 3.3, his BUN is 10, creatinine 0.96, glucose was 130. Urine drug screen was ordered and is pending at this time. Alcohol screen was less than 10. No imaging studies were performed in the ER. A CT scan of the head was ordered and is pending at this time. ASSESSMENT AND PLAN: 
1. This is a patient who presents with what he reports to be breakthrough seizures. 2.  Bipolar disorder. 3.  History of right frontal lobectomy in the past. 
4.  Tobacco abuse disorder. 5.  Questionable dyslipidemia; however, the patient reports he is not on any medications. 6.  History of substance abuse in the past.  Patient denies. We will check urine drug screen. 7.  Bipolar disorder. We will request psychiatric consultation for evaluation and management of his bipolar disorder. 8.  Dyslipidemia. Check lipid panel in a.m. tomorrow. 9.  Deep vein thrombosis prophylaxis. SCD. 10.  CODE STATUS:  FULL CODE. 11.  Tobacco abuse disorder. Patient advised to quit, place on nicotine patch. PLAN:  The patient will be admitted under observation to the neurologic floor. Patient has received Keppra as well as Dilantin in the ER.   We will request a neurology evaluation for further recommendations on plan of care. I will defer EEG to neurology. We will obtain CT scan of the head at this time. The patient will also be placed on Ativan as needed for seizures. We will await further recommendations from neurologist.  He will stay in hospital 1-2 midnights. Followup testing while in the hospital.  Patient was taken in agreement with the plan. We will continue to follow the patient while in the hospital. 
 
 
Pam Maurer MD 
 
  
JVK/HANNAH 
D: 10/07/2018 11:15    
T: 10/07/2018 12:11 JOB #: [id number]

## 2018-10-07 NOTE — PROGRESS NOTES
Return visit to pt in ICU. He was the focus of a Code [place] prior to this visit. He attempted to explain what happened. Pt had put on his personal clothes. When asked if he was leaving he said that he put them on because he was cold. He continues to have difficulty maintaining a subject in conversation. One main concern he voiced was that of transportation upon discharge. This was passed on the the Nurse Supervisor. Chaplains will follow as needed. Chaplain Joss, MDiv, MS, [facility] 
[phone] PRAY [phone])
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
## 2018-10-07 NOTE — PROGRESS NOTES
1200: Patient arrived to ICU. VSS. Neurology and pysc consults called. 1330: Patient impulsive and getting out of bed, RN attempted to put bed alarm on. Patient became irrate. Nursing supervisor called to bedside. Patient was explained the importance of a bed alarm. Patient still refusing bed alarm. RN will keep a close eye on patient. Charge nurse is aware.
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
## 2018-10-07 NOTE — ED NOTES
Pt refusing to sit in bed, worried about getting belongings out of bag in chair, unable to obtain repeat VS at this time. Pt escorted to unit with paramedic and HPD to de-escalate patient and remove from stimulation in ED.

## 2018-10-07 NOTE — PROGRESS NOTES
Called into his pharmacy and reordered his home medications. Ativan Prn for agitation. Hold for sedation.

## 2018-10-07 NOTE — PROGRESS NOTES
Spiritual Care Assessment/Progress Note [address] 
 
 
NAME: Wen Cortez      MRN: [id number] AGE: 29 y.o. SEX: male Shinto Affiliation: Ziyadi Language: English  
 
10/7/2018     Total Time (in minutes): 25 Spiritual Assessment begun in [facility] 7S1 INTENSIVE CARE through conversation with: 
  
    [x]Patient        [] Family    [] Friend(s) Reason for Consult: Request by patient Spiritual beliefs: (Please include comment if needed) [x] Identifies with a rosa tradition:     
   [] Supported by a rosa community:        
   [] Claims no spiritual orientation:       
   [] Seeking spiritual identity:            
   [] Adheres to an individual form of spirituality:       
   [] Not able to assess:                   
 
    
Identified resources for coping:  
   [] Prayer                           
   [] Music                  [] Guided Imagery 
   [] Family/friends                 [] Pet visits [] Devotional reading                         [x] Unknown 
   [] Other:                                         
 
 
Interventions offered during this visit: (See comments for more details) Patient Interventions: Affirmation of emotions/emotional suffering, Affirmation of rosa, Coping skills reviewed/reinforced Plan of Care: 
 
 [] Support spiritual and/or cultural needs  
 [] Support AMD and/or advance care planning process    
 [] Support grieving process 
 [] Coordinate Rites and/or Rituals  
 [] Coordination with community clergy [] No spiritual needs identified at this time 
 [] Detailed Plan of Care below (See Comments)  [] Make referral to Music Therapy 
[] Make referral to Pet Therapy    
[] Make referral to Addiction services 
[] Make referral to [facility] 
[] Make referral to Spiritual Care Partner 
[] No future visits requested       
[x] Follow up visits as needed Visited pt on ICU at his request. He called the 's office indicating that he wanted to continue a conversation he had with a  last night. This did not occur as he was not a patient until this morning and no  has visited with him today. At the very onset of the visit he immediately launched into a disjointed account of where he lives and those with whom he lives. He interspersed his current (?) situation with past situations and people without the benefit of transitions. Consulted with pt's nurse, Grey Hadley. She indicated that a psych evaluation was working. Chaplains will follow as needed. Chaplain Aneesh, MDiv, MS, [facility] 
[phone] PRAY [phone])
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
## 2018-10-07 NOTE — ED NOTES
Pt c/o of itching, observed resting in bed talking on phone. Dr. Kemar Hidalgo  Updated. 10:23 AM 
Pt heard from the nursing station yelling into the phone. [employer] reported to bedside. Pt witnessed throwing backpack from stretcher to the floor. Pt agitated about getting ahold of emergency contact to update. Pt assisted with making phone call and agreed to settled down. Pt belongings placed into patient belonging back out of reach. 11:13 AM 
Pt undressing self from gown and changed self back into street clothes. Pt assisted back into bed and instructed not to get back up without calling for help. Call bell in reach and pt re-instructed on appropriate us, pt demonstrates and verbalizes understanding. Pt states, \"I'm sorry, I won't do it again. I don't give these out often, but you can slap me. Just don't do it too hard because I'm sadistic. \" Pt asked to be respectful and to cease use of any and all inappropriate language. Pt placed back on cardiac monitor x 4. Curtain open, visible from nursing station.

## 2018-10-07 NOTE — ED NOTES
Hospitalist at bedside. Pt removed all ED monitoring equipment stating that he took them off because he went to XR. Note that patient did not go, nor have any orders to go to xray. Pt changed initial statement of not taking epilepsy meds for months to stating to MD that he had just \"missed a few doses\". Pt also appears disoriented and manic. Pt accused nurse of \"putting something in my IV\". RN always verified pt was aware of meds being received. 11:27 AM 
Pt rang call bell and said \"something spilled all over the computer\". Computer was pulled closer to bedside and drink used to take PO meds was spilled all over keyboard, mouse, etc. Pt denies pulling computer closer to him. 11:54 AM 
Pt removed entire pump from bedside along with lines because he states that he \"has a brain tumor. \"

## 2018-10-08 NOTE — ED NOTES
Pt escorted to triage room 2, discussed Pt's current situation. Pt was previous admitted and signed out AMA around 1900 this evening. Pt states he was discouraged by nursing care in ICU and states he was not being adequately managed for his seizures. Pt  Informed by staff that he will need to check back into the ED. Pt also informed will need to remain calm and cooperative with staff. Pt agrees to remain calm and cooperative with staff.

## 2018-10-08 NOTE — PROGRESS NOTES
[address] at bedside to secure patient's personal knife. Patient verbally aggressive towards bedside nurse and charge nurse. Security escorted patient back to room. Patient continued to be verbally aggressive towards nursing staff. Patient exits patient room stating Ritesh Mcknight is sick of this\" and he's leaving AMA. IV removed. AMA papers signed by patient. Patient escorted off unit by security.

## 2018-10-08 NOTE — DISCHARGE SUMMARY
Discharge Summary       PATIENT ID: Toribio Ace  MRN: [id number]   YOB: 1985    DATE OF ADMISSION: 10/7/2018  8:48 AM    DATE OF DISCHARGE: 10/08/18  PRIMARY CARE PROVIDER: None       DISCHARGING PROVIDER: Macario Rosales MD    To contact this individual call [phone] and ask the  to page. If unavailable ask to be transferred the Adult Hospitalist Department. CONSULTATIONS: IP CONSULT TO NEUROLOGY      PROCEDURES/SURGERIES: * No surgery found *    IMAGING  Ct Head Wo Cont    Result Date: 10/7/2018  IMPRESSION: 1. No evidence of acute intracranial abnormality. 2. Unchanged right anterior frontal encephalomalacia. HPI:  Patient is a pleasant 27-year-old  gentleman with past medical history significant for seizure disorder, hypertension, bipolar disorder, anxiety, depression, who was in his usual state of health yesterday morning. He said he went into [facility]. At that time, he was given Keppra as well as Robaxin for his complaints and was discharged to home from the ER. I have reviewed the discharge paperwork that he brought in [place] [facility]. He reports that he went home and he has not filled up his prescriptions, which were given to him. He was still feeling anxious that he was going to have a seizure and came into the emergency room. In the ER, the ER doctor has reviewed the case and given the patient Keppra as well as fosphenytoin bolus loading and requested hospitalist to admit the patient for observation. The patient himself  when I went to see him. Denied any seizures at this time. He reports that he thinks he might have had seizures. He has a  who he thinks might have seen him, but I do not have the spouse at his bedside to verify if he already had a seizure.   The patient himself denies that he could remember any seizure, but he thinks he might have had a seizure as mentioned earlier. He denies any fevers or chills. As mentioned earlier, he has not taken any seizure medicines and he still has his prescriptions with him on hand. He reports that he had electroconvulsive therapy at [facility] in the past and has not been on any seizure medicine since then. When I saw the patient, he had a foul smelling odor like somebody who has not taken a shower for a few days now. He also reported that he was having itching around his groin area, but he was refusing examination of that area. He denies any fevers or chills. He denies any bloody stool. Denies hematemesis or hematochezia. He reports that he has a neurologist locally but he cannot remember the name. In the ER, he was loaded up with Keppra and fosphenytoin and was referred to hospitalist for admission.  [address]:   patient was admitted to telemetry. Ct scan head was negative for acute abnormality. Neurology evaluated and recommended him keppra 1000mg po bid. He was advised to be under observation, Psychiatry evaluated and recommended him to be on same medications for mood control as Op, - this was reported to me by nurse(Consult note pending at the time of this note). Overnight patient decided to leave ama despite staff appearing to advise him of the risk of seizures and death if he leaves. He went ama.          DISCHARGE DIAGNOSES / PLAN:      Patient Active Problem List   Diagnosis Code    Adjustment disorder with depressed mood F43.21    Borderline personality disorder ([address] F60.3    Polysubstance overdose T50.901A    Non-compliance with treatment Z91.19    Malingering Z76.5    Polysubstance dependence ([address] F19.20    Localization-related (focal) (partial) epilepsy and epileptic syndromes with complex partial seizures, with intractable epilepsy G40.219    History of suicidal ideation Z86.59    Psychosis ([address] F29    Bipolar disorder with severe depression ([address] F31.4    Dilantin toxicity T42.0X1A  Bipolar disorder, unspecified ([facility] F31.9    Accidental phenytoin poisoning T42. 0X1A    Unsteady gait R26.81    History of craniotomy Z98.890    Symptomatic generalized epilepsy ([facility] G40.409    Marijuana use F12.90    Nicotine dependence F17.200    Seizure (HCC) R56.9    HTN (hypertension) I10    TBI (traumatic brain injury) ([facility] S06. 9X9A    Hyperlipidemia E78.5    Epilepsy (HCC) G40.909    Seizures ([facility] R56.9    Non compliance w medication regimen Z91.14    Left rotator cuff tear M75.102           Recent Results (from the past 24 hour(s))   SAMPLES BEING HELD    Collection Time: 10/07/18  9:04 AM   Result Value Ref Range    SAMPLES BEING HELD 1RED 1BLU 1UC     COMMENT        Add-on orders for these samples will be processed based on acceptable specimen integrity and analyte stability, which may vary by analyte. CBC WITH AUTOMATED DIFF    Collection Time: 10/07/18  9:05 AM   Result Value Ref Range    WBC 10.3 4.1 - 11.1 K/uL    RBC 4.44 4.10 - 5.70 M/uL    HGB 13.2 12.1 - 17.0 g/dL    HCT 39.5 36.6 - 50.3 %    MCV 89.0 80.0 - 99.0 FL    MCH 29.7 26.0 - 34.0 PG    MCHC 33.4 30.0 - 36.5 g/dL    RDW 13.0 11.5 - 14.5 %    PLATELET 014 851 - 207 K/uL    MPV 11.4 8.9 - 12.9 FL    NRBC 0.0 0  WBC    ABSOLUTE NRBC 0.00 0.00 - 0.01 K/uL    NEUTROPHILS 70 32 - 75 %    LYMPHOCYTES 22 12 - 49 %    MONOCYTES 5 5 - 13 %    EOSINOPHILS 2 0 - 7 %    BASOPHILS 0 0 - 1 %    IMMATURE GRANULOCYTES 0 0.0 - 0.5 %    ABS. NEUTROPHILS 7.2 1.8 - 8.0 K/UL    ABS. LYMPHOCYTES 2.3 0.8 - 3.5 K/UL    ABS. MONOCYTES 0.6 0.0 - 1.0 K/UL    ABS. EOSINOPHILS 0.2 0.0 - 0.4 K/UL    ABS. BASOPHILS 0.0 0.0 - 0.1 K/UL    ABS. IMM.  GRANS. 0.0 0.00 - 0.04 K/UL    DF AUTOMATED     METABOLIC PANEL, COMPREHENSIVE    Collection Time: 10/07/18  9:05 AM   Result Value Ref Range    Sodium 142 136 - 145 mmol/L    Potassium 3.3 (L) 3.5 - 5.1 mmol/L    Chloride 107 97 - 108 mmol/L    CO2 24 21 - 32 mmol/L    Anion gap 11 5 - 15 mmol/L Glucose 130 (H) 65 - 100 mg/dL    BUN 10 6 - 20 MG/DL    Creatinine 0.96 0.70 - 1.30 MG/DL    BUN/Creatinine ratio 10 (L) 12 - 20      GFR est AA >60 >60 ml/min/1.73m2    GFR est non-AA >60 >60 ml/min/1.73m2    Calcium 8.8 8.5 - 10.1 MG/DL    Bilirubin, total 0.4 0.2 - 1.0 MG/DL    ALT (SGPT) 30 12 - 78 U/L    AST (SGOT) 24 15 - 37 U/L    Alk. phosphatase 96 45 - 117 U/L    Protein, total 7.1 6.4 - 8.2 g/dL    Albumin 3.8 3.5 - 5.0 g/dL    Globulin 3.3 2.0 - 4.0 g/dL    A-G Ratio 1.2 1.1 - 2.2     ETHYL ALCOHOL    Collection Time: 10/07/18  9:05 AM   Result Value Ref Range    ALCOHOL(ETHYL),SERUM <10 <10 MG/DL   DRUG SCREEN, URINE    Collection Time: 10/07/18 10:55 AM   Result Value Ref Range    AMPHETAMINES NEGATIVE  NEG      BARBITURATES NEGATIVE  NEG      BENZODIAZEPINES NEGATIVE  NEG      COCAINE NEGATIVE  NEG      METHADONE NEGATIVE  NEG      OPIATES POSITIVE (A) NEG      PCP(PHENCYCLIDINE) NEGATIVE  NEG      THC (TH-CANNABINOL) POSITIVE (A) NEG      Drug screen comment (NOTE)            CHRONIC MEDICAL DIAGNOSES:  Problem List as of 10/7/2018  Date Reviewed: 2/1/2018          Codes Class Noted - Resolved    Left rotator cuff tear ICD-10-CM: M75.102  ICD-9-CM: 840.4  10/10/2017 - Present        Epilepsy ([facility] (Chronic) ICD-10-CM: G40.909  ICD-9-CM: 345.90  10/9/2017 - Present        Seizures ([facility] ICD-10-CM: R56.9  ICD-9-CM: 780.39  10/9/2017 - Present        Non compliance w medication regimen ICD-10-CM: Z91.14  ICD-9-CM: V15.81  10/9/2017 - Present        Seizure ([facility] ICD-10-CM: R56.9  ICD-9-CM: 780.39  9/28/2017 - Present        HTN (hypertension) ICD-10-CM: I10  ICD-9-CM: 401.9  9/28/2017 - Present        TBI (traumatic brain injury) ([address] (Chronic) ICD-10-CM: S06. 9X9A  ICD-9-CM: 854.00  9/28/2017 - Present        Hyperlipidemia ICD-10-CM: E78.5  ICD-9-CM: 272.4  9/28/2017 - Present        Marijuana use ICD-10-CM: F12.90  ICD-9-CM: 305.20  6/25/2017 - Present        Nicotine dependence (Chronic) ICD-10-CM: F17.200  ICD-9-CM: 305.1  6/25/2017 - Present        Symptomatic generalized epilepsy ([facility] ICD-10-CM: G40.409  ICD-9-CM: 345.90  6/24/2017 - Present        History of craniotomy ICD-10-CM: Z98.890  ICD-9-CM: V45.89  6/23/2017 - Present        Unsteady gait ICD-10-CM: R26.81  ICD-9-CM: 781.2  12/24/2016 - Present        Accidental phenytoin poisoning ICD-10-CM: T42.0X1A  ICD-9-CM: 966.1, E855.0  12/23/2016 - Present        Bipolar disorder, unspecified ([facility] ICD-10-CM: F31.9  ICD-9-CM: 296.80  9/17/2016 - Present        Dilantin toxicity ICD-10-CM: T42.0X1A  ICD-9-CM: 966.1, E980.4  9/6/2016 - Present        Bipolar disorder with severe depression ([facility] ICD-10-CM: F31.4  ICD-9-CM: 296.53  7/22/2014 - Present        History of suicidal ideation ICD-10-CM: Z86.59  ICD-9-CM: V11.8  7/19/2014 - Present        Psychosis ([facility] ICD-10-CM: F29  ICD-9-CM: 298.9  7/19/2014 - Present        Localization-related (focal) (partial) epilepsy and epileptic syndromes with complex partial seizures, with intractable epilepsy ICD-10-CM: G40.219  ICD-9-CM: 345.41  7/1/2013 - Present        Non-compliance with treatment (Chronic) ICD-10-CM: Z91.19  ICD-9-CM: V15.81  3/7/2012 - Present        Malingering ICD-10-CM: Z76.5  ICD-9-CM: V65.2  3/7/2012 - Present    Overview Signed 3/7/2012  6:21 PM by Adrianna Corona MD     Vs fictitious disorder             Polysubstance dependence ([facility] (Chronic) ICD-10-CM: S12.78  ICD-9-CM: 304.80  3/7/2012 - Present        Polysubstance overdose ICD-10-CM: T50.901A  ICD-9-CM: 977.9, E980.5 Chronic 1/27/2012 - Present        Adjustment disorder with depressed mood ICD-10-CM: F43.21  ICD-9-CM: 309.0  1/3/2012 - Present        Borderline personality disorder ([facility] ICD-10-CM: F60.3  ICD-9-CM: 301.83  1/3/2012 - Present        RESOLVED: Seizures ([facility] ICD-10-CM: R56.9  ICD-9-CM: 780.39  7/29/2014 - 6/24/2017        RESOLVED: Opioid overdose ([facility] ICD-10-CM: N33.7F0Z  ICD-9-CM: 965.09, E980.0  7/18/2014 - 7/19/2014        RESOLVED: Narcotic overdose ([facility] ICD-10-CM: T40.601A  ICD-9-CM: 967.9, E980.2  7/18/2014 - 7/19/2014                Signed:   Charmaine Scott MD  10/8/2018  7:40 AM

## 2018-10-08 NOTE — ED NOTES
Called to treatment area; no answer. Security reports pt. In Hospital waiting area and does not want to come back to the ED.

## 2018-10-26 ENCOUNTER — HOSPITAL ENCOUNTER (EMERGENCY)
Dept: HOSPITAL 31 - C.ER | Age: 33
Discharge: HOME | End: 2018-10-26
Payer: MEDICAID

## 2018-10-26 VITALS — DIASTOLIC BLOOD PRESSURE: 80 MMHG | SYSTOLIC BLOOD PRESSURE: 140 MMHG | HEART RATE: 80 BPM | RESPIRATION RATE: 14 BRPM

## 2018-10-26 VITALS — TEMPERATURE: 98.8 F

## 2018-10-26 VITALS — OXYGEN SATURATION: 99 %

## 2018-10-26 DIAGNOSIS — G40.909: Primary | ICD-10-CM

## 2018-10-26 NOTE — C.PDOC
History Of Present Illness


34 y/o male presents to the ED for evaluation after experiencing a seizure 

earlier today. The patient states he has a PMHx of seizure and usually has one 

about once a month. After this seizure the patient reports feeling light-headed,

which is not a usual symptom he experiences after his seizures, prompting ED 

visit. He denies any headache or LOC. 


Time Seen by Provider: 10/26/18 00:21


Chief Complaint (Nursing): Altered Mental Status


History Per: Patient


History/Exam Limitations: None


Onset/Duration Of Symptoms: Hrs


Recent travel outside of the [place]: No


Associated Symptoms: denies: Headache





Past Medical History


Reviewed: Historical Data, Nursing Documentation, Vital Signs


Vital Signs: 





                                Last Vital Signs











Temp  98.8 F   10/26/18 00:08


 


Pulse  64   10/26/18 00:08


 


Resp  18   10/26/18 00:08


 


BP  158/97 H  10/26/18 00:08


 


Pulse Ox  99   10/26/18 00:08














- Medical History


PMH: Seizures


Other Surgeries: Orthopedic surgery


Family History: States: Unknown Family Hx





- Social History


Hx Alcohol Use: No


Hx Substance Use: No





- Immunization History


Hx Tetanus Toxoid Vaccination: No


Hx Influenza Vaccination: No


Hx Pneumococcal Vaccination: No





Review Of Systems


Except As Marked, All Systems Reviewed And Found Negative.


Constitutional: Negative for: Fever, Chills


Neurological: Positive for: Other (light-headed).  Negative for: Headache





Physical Exam





- Physical Exam


Appears: Non-toxic, No Acute Distress, Other (Anxious, verbal)


Skin: Warm, Dry


Head: Atraumatic, Normacephalic


Eye(s): right: Other (stable right eye strabismus)


Neck: Supple


Chest: Symmetrical


Cardiovascular: Rhythm Regular, No Murmur


Respiratory: No Rales, No Rhonchi, No Wheezing


Gastrointestinal/Abdominal: Soft, No Tenderness, No Distention


Back: No CVA Tenderness


Extremity: Normal ROM


Extremity: Bilateral: Normal Color And Temperature, Normal ROM


Neurological/Psych: Other (Awake, alert and oriented )





ED Course And Treatment


O2 Sat by Pulse Oximetry: 99 (RA)


Pulse Ox Interpretation: Normal





Medical Decision Making


Medical Decision Making: 





typical seizure, usually once/month


good compliance with 3 meds


defers w/u for Xanax PO for anxiety as he is far from home 


reassured.


normal exam.





Disposition


Doctor Will See Patient In The: Office


Counseled Patient/Family Regarding: Studies Performed, Diagnosis





- Disposition


Referrals: 


[facility] [Outside]


[facility] [Outside]


[facility] [Outside]


Disposition: HOME/ ROUTINE


Disposition Time: 00:58


Condition: GOOD


Additional Instructions: 


continue normal anti-seizure meds


outpatient follow up with out Clinic as needed. 


Instructions:  Epilepsy in Adults


Forms:  Vapotherm (English)





- Clinical Impression


Clinical Impression: 


 Seizure disorder








- Scribe Statement


The provider has reviewed the documentation as recorded by the Scribe


Provider Attestation: 


Kiesha Gallegos





All medical record entries made by the Scribe were at my direction and 

personally dictated by me. I have reviewed the chart and agree that the record 

accurately reflects my personal performance of the history, physical exam, medic

al decision making, and the department course for this patient. I have also 

personally directed, reviewed, and agree with the discharge instructions and 

disposition.

## 2018-12-12 ENCOUNTER — INPATIENT (INPATIENT)
Facility: HOSPITAL | Age: 33
LOS: 4 days | Discharge: HOME | End: 2018-12-17
Attending: PSYCHIATRY & NEUROLOGY | Admitting: PSYCHIATRY & NEUROLOGY

## 2018-12-12 VITALS
RESPIRATION RATE: 18 BRPM | DIASTOLIC BLOOD PRESSURE: 78 MMHG | TEMPERATURE: 98 F | SYSTOLIC BLOOD PRESSURE: 144 MMHG | HEART RATE: 74 BPM | OXYGEN SATURATION: 100 %

## 2018-12-12 DIAGNOSIS — F33.1 MAJOR DEPRESSIVE DISORDER, RECURRENT, MODERATE: ICD-10-CM

## 2018-12-12 LAB
ALBUMIN SERPL ELPH-MCNC: 4.8 G/DL — SIGNIFICANT CHANGE UP (ref 3.5–5.2)
ALP SERPL-CCNC: 98 U/L — SIGNIFICANT CHANGE UP (ref 30–115)
ALT FLD-CCNC: 30 U/L — SIGNIFICANT CHANGE UP (ref 0–41)
ANION GAP SERPL CALC-SCNC: 15 MMOL/L — HIGH (ref 7–14)
APAP SERPL-MCNC: <5 UG/ML — LOW (ref 10–30)
APPEARANCE UR: ABNORMAL
AST SERPL-CCNC: 20 U/L — SIGNIFICANT CHANGE UP (ref 0–41)
BACTERIA # UR AUTO: ABNORMAL /HPF
BASOPHILS # BLD AUTO: 0.05 K/UL — SIGNIFICANT CHANGE UP (ref 0–0.2)
BASOPHILS NFR BLD AUTO: 0.6 % — SIGNIFICANT CHANGE UP (ref 0–1)
BILIRUB SERPL-MCNC: 0.2 MG/DL — SIGNIFICANT CHANGE UP (ref 0.2–1.2)
BILIRUB UR-MCNC: NEGATIVE — SIGNIFICANT CHANGE UP
BUN SERPL-MCNC: 15 MG/DL — SIGNIFICANT CHANGE UP (ref 10–20)
CALCIUM SERPL-MCNC: 9.7 MG/DL — SIGNIFICANT CHANGE UP (ref 8.5–10.1)
CHLORIDE SERPL-SCNC: 100 MMOL/L — SIGNIFICANT CHANGE UP (ref 98–110)
CO2 SERPL-SCNC: 24 MMOL/L — SIGNIFICANT CHANGE UP (ref 17–32)
COLOR SPEC: YELLOW — SIGNIFICANT CHANGE UP
CREAT SERPL-MCNC: 0.8 MG/DL — SIGNIFICANT CHANGE UP (ref 0.7–1.5)
DIFF PNL FLD: NEGATIVE — SIGNIFICANT CHANGE UP
EOSINOPHIL # BLD AUTO: 0.11 K/UL — SIGNIFICANT CHANGE UP (ref 0–0.7)
EOSINOPHIL NFR BLD AUTO: 1.4 % — SIGNIFICANT CHANGE UP (ref 0–8)
ETHANOL SERPL-MCNC: <10 MG/DL — HIGH
GLUCOSE SERPL-MCNC: 90 MG/DL — SIGNIFICANT CHANGE UP (ref 70–99)
GLUCOSE UR QL: NEGATIVE MG/DL — SIGNIFICANT CHANGE UP
HCT VFR BLD CALC: 41.2 % — LOW (ref 42–52)
HGB BLD-MCNC: 14 G/DL — SIGNIFICANT CHANGE UP (ref 14–18)
IMM GRANULOCYTES NFR BLD AUTO: 0.4 % — HIGH (ref 0.1–0.3)
KETONES UR-MCNC: NEGATIVE — SIGNIFICANT CHANGE UP
LEUKOCYTE ESTERASE UR-ACNC: NEGATIVE — SIGNIFICANT CHANGE UP
LYMPHOCYTES # BLD AUTO: 2.24 K/UL — SIGNIFICANT CHANGE UP (ref 1.2–3.4)
LYMPHOCYTES # BLD AUTO: 27.8 % — SIGNIFICANT CHANGE UP (ref 20.5–51.1)
MCHC RBC-ENTMCNC: 29.7 PG — SIGNIFICANT CHANGE UP (ref 27–31)
MCHC RBC-ENTMCNC: 34 G/DL — SIGNIFICANT CHANGE UP (ref 32–37)
MCV RBC AUTO: 87.3 FL — SIGNIFICANT CHANGE UP (ref 80–94)
MONOCYTES # BLD AUTO: 0.54 K/UL — SIGNIFICANT CHANGE UP (ref 0.1–0.6)
MONOCYTES NFR BLD AUTO: 6.7 % — SIGNIFICANT CHANGE UP (ref 1.7–9.3)
NEUTROPHILS # BLD AUTO: 5.08 K/UL — SIGNIFICANT CHANGE UP (ref 1.4–6.5)
NEUTROPHILS NFR BLD AUTO: 63.1 % — SIGNIFICANT CHANGE UP (ref 42.2–75.2)
NITRITE UR-MCNC: NEGATIVE — SIGNIFICANT CHANGE UP
NRBC # BLD: 0 /100 WBCS — SIGNIFICANT CHANGE UP (ref 0–0)
PH UR: 7 — SIGNIFICANT CHANGE UP (ref 5–8)
PLATELET # BLD AUTO: 253 K/UL — SIGNIFICANT CHANGE UP (ref 130–400)
POTASSIUM SERPL-MCNC: 3.9 MMOL/L — SIGNIFICANT CHANGE UP (ref 3.5–5)
POTASSIUM SERPL-SCNC: 3.9 MMOL/L — SIGNIFICANT CHANGE UP (ref 3.5–5)
PROT SERPL-MCNC: 6.8 G/DL — SIGNIFICANT CHANGE UP (ref 6–8)
PROT UR-MCNC: NEGATIVE MG/DL — SIGNIFICANT CHANGE UP
RBC # BLD: 4.72 M/UL — SIGNIFICANT CHANGE UP (ref 4.7–6.1)
RBC # FLD: 13.2 % — SIGNIFICANT CHANGE UP (ref 11.5–14.5)
SALICYLATES SERPL-MCNC: <0.3 MG/DL — LOW (ref 4–30)
SODIUM SERPL-SCNC: 139 MMOL/L — SIGNIFICANT CHANGE UP (ref 135–146)
SP GR SPEC: 1.02 — SIGNIFICANT CHANGE UP (ref 1.01–1.03)
UROBILINOGEN FLD QL: 1 MG/DL (ref 0.2–0.2)
WBC # BLD: 8.05 K/UL — SIGNIFICANT CHANGE UP (ref 4.8–10.8)
WBC # FLD AUTO: 8.05 K/UL — SIGNIFICANT CHANGE UP (ref 4.8–10.8)

## 2018-12-12 RX ORDER — CLONAZEPAM 1 MG
1 TABLET ORAL
Qty: 0 | Refills: 0 | Status: DISCONTINUED | OUTPATIENT
Start: 2018-12-13 | End: 2018-12-17

## 2018-12-12 RX ORDER — TRAZODONE HCL 50 MG
100 TABLET ORAL AT BEDTIME
Qty: 0 | Refills: 0 | Status: DISCONTINUED | OUTPATIENT
Start: 2018-12-13 | End: 2018-12-17

## 2018-12-12 RX ORDER — SODIUM CHLORIDE 9 MG/ML
3 INJECTION INTRAMUSCULAR; INTRAVENOUS; SUBCUTANEOUS ONCE
Qty: 0 | Refills: 0 | Status: DISCONTINUED | OUTPATIENT
Start: 2018-12-12 | End: 2018-12-12

## 2018-12-12 RX ORDER — LANOLIN ALCOHOL/MO/W.PET/CERES
3 CREAM (GRAM) TOPICAL AT BEDTIME
Qty: 0 | Refills: 0 | Status: DISCONTINUED | OUTPATIENT
Start: 2018-12-13 | End: 2018-12-17

## 2018-12-12 RX ORDER — MIRTAZAPINE 45 MG/1
7.5 TABLET, ORALLY DISINTEGRATING ORAL AT BEDTIME
Qty: 0 | Refills: 0 | Status: DISCONTINUED | OUTPATIENT
Start: 2018-12-13 | End: 2018-12-17

## 2018-12-12 RX ORDER — NICOTINE POLACRILEX 2 MG
2 GUM BUCCAL
Qty: 0 | Refills: 0 | Status: DISCONTINUED | OUTPATIENT
Start: 2018-12-13 | End: 2018-12-17

## 2018-12-12 NOTE — ED BEHAVIORAL HEALTH ASSESSMENT NOTE - RISK ASSESSMENT
Patient's suicide risk factors of prior suicide attempt and worsening depression is mitigated by his willingess to seek hospitalization for safety. Risk may be further mitigated with inpatient treatment for safety and medication optimization.

## 2018-12-12 NOTE — ED PROVIDER NOTE - SHIFT CHANGE DETAILS
34 y/o me with worsening depressions, evaluated by psych, admitted, pending bed, [facility], currently in nad, will continue to monitor and reassess.

## 2018-12-12 NOTE — ED BEHAVIORAL HEALTH ASSESSMENT NOTE - OTHER PAST PSYCHIATRIC HISTORY (INCLUDE DETAILS REGARDING ONSET, COURSE OF ILLNESS, INPATIENT/OUTPATIENT TREATMENT)
Multiple prior IPP admissions since 2001 when he was first diagnosed with depression. Initial admission following suicide attempt via pill ingestion. Most recent a few months ago he was admitted to IPP in [place] for depression. Patient currently follows outpatient with therapist Priscilla Jensen and Dr. Mckeon (last seen 2-3 weeks ago). He reports that he has an appt with Dr. Springer at [place] on 12/20.
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
## 2018-12-12 NOTE — ED BEHAVIORAL HEALTH ASSESSMENT NOTE - CURRENT MEDICATION
Medications confirmed with [facility] ([phone])  clonazepam 1mg bid (12/04), Trazodone 100mg qhs, Buspar 20mg bid, Remeron 7.5mg qhs, Lamotrigine 25mg daily  Dr. Early [phone]  No information on [employer].

## 2018-12-12 NOTE — ED PROVIDER NOTE - OBJECTIVE STATEMENT
33yM presents with depression X 1 month, progressively worsening. patient would like to be evaluated for inpatient admission. currently denies avh. no si/hi.

## 2018-12-12 NOTE — ED PROVIDER NOTE - NS ED ROS FT
Review of Systems    Constitutional: (-) fever  Cardiovascular: (-) chest pain, (-) syncope  Respiratory: (-) cough, (-) shortness of breath  Gastrointestinal: (-) vomiting, (-) diarrhea, (-) abdominal pain  Musculoskeletal: (-) neck pain, (-) back pain, (-) joint pain  Integumentary: (-) rash, (-) edema  Neurological: (-) headache, (-) altered mental status  Psych: (-) SI, (-) HI, (-) AVH
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
## 2018-12-12 NOTE — ED PROVIDER NOTE - PROGRESS NOTE DETAILS
psych at bedside recommending admission for further eval. pt pending bed assignment. s/o Dr. Melton Pt evaluated after sign out. Comfortable. Will continue to observe.

## 2018-12-12 NOTE — ED PROVIDER NOTE - ATTENDING CONTRIBUTION TO CARE
33 yr old male previous IPP admissions, hx of substance abuse depression, presents for eval of feeling sad about upcoming/recent family deaths and birthdays, feeling that he is heading in mind to wanting to hurt himself. The pt has had previous suicide attempt. on exam pt calm, cooperative, s1s2 ctab soft nt/nd. pt seen by psych will admit.

## 2018-12-13 DIAGNOSIS — R56.9 UNSPECIFIED CONVULSIONS: ICD-10-CM

## 2018-12-13 DIAGNOSIS — F32.9 MAJOR DEPRESSIVE DISORDER, SINGLE EPISODE, UNSPECIFIED: ICD-10-CM

## 2018-12-13 LAB
T3 SERPL-MCNC: 109 NG/DL — SIGNIFICANT CHANGE UP (ref 80–200)
T4 AB SER-ACNC: 6.7 UG/DL — SIGNIFICANT CHANGE UP (ref 4.6–12)
TSH SERPL-MCNC: 1.81 UIU/ML — SIGNIFICANT CHANGE UP (ref 0.27–4.2)

## 2018-12-13 RX ORDER — LAMOTRIGINE 25 MG/1
25 TABLET, ORALLY DISINTEGRATING ORAL DAILY
Qty: 0 | Refills: 0 | Status: DISCONTINUED | OUTPATIENT
Start: 2018-12-13 | End: 2018-12-13

## 2018-12-13 RX ORDER — LAMOTRIGINE 25 MG/1
100 TABLET, ORALLY DISINTEGRATING ORAL
Qty: 0 | Refills: 0 | Status: DISCONTINUED | OUTPATIENT
Start: 2018-12-13 | End: 2018-12-17

## 2018-12-13 RX ADMIN — LAMOTRIGINE 100 MILLIGRAM(S): 25 TABLET, ORALLY DISINTEGRATING ORAL at 20:19

## 2018-12-13 RX ADMIN — Medication 1 MILLIGRAM(S): at 09:03

## 2018-12-13 RX ADMIN — Medication 3 MILLIGRAM(S): at 22:19

## 2018-12-13 RX ADMIN — Medication 20 MILLIGRAM(S): at 20:22

## 2018-12-13 RX ADMIN — Medication 1 MILLIGRAM(S): at 20:20

## 2018-12-13 RX ADMIN — Medication 20 MILLIGRAM(S): at 09:03

## 2018-12-13 RX ADMIN — LAMOTRIGINE 25 MILLIGRAM(S): 25 TABLET, ORALLY DISINTEGRATING ORAL at 09:03

## 2018-12-13 NOTE — H&P ADULT - NSHPREVIEWOFSYSTEMS_GEN_ALL_CORE
General:	no fever, weight loss,  chills  Skin: no rash, ulcers  Ophthalmologic: no visual changes  ENMT:	no sore throat  Respiratory and Thorax: no cough, wheeze,  sob  Cardiovascular:	no chest pain, palpitations, dizziness  Gastrointestinal:	no nausea, vomiting, diarrhea, abd pain  Genitourinary:	no dysuria, hematuria  Musculoskeletal:	no joint pains  Neurological:	 no speech disturbance, focal weakness, numbness  Psychiatric:	as above  Hematology/Lymphatics:	no anemia  Endocrine:	no polyuria, polydipsia General:	no fever, weight loss,  chills  Skin: no rash, ulcers  Ophthalmologic: no visual changes  ENMT:	no sore throat  Respiratory and Thorax: no cough, wheeze,  sob  Cardiovascular:	no chest pain, palpitations, dizziness  Gastrointestinal:	no nausea, vomiting, diarrhea, abd pain  Genitourinary:	no dysuria, hematuria  Musculoskeletal:	no joint pains  Neurological:	 no speech disturbance, focal weakness, numbness   Patient states he had a small seizure 2 days ago witnessed by his significant other which was reportedly a brief generalized seizure.   Psychiatric:	as above  Hematology/Lymphatics:	no anemia  Endocrine:	no polyuria, polydipsia

## 2018-12-13 NOTE — PATIENT PROFILE BEHAVIORAL HEALTH - NSBHSCDSCRN_PSY_A_CORE
psychiatric illness/history of suicide attempt at 16 years old with pills via overdose/ages [age]/recent loss

## 2018-12-13 NOTE — ED ADULT NURSE NOTE - CHIEF COMPLAINT QUOTE
Pt came c/o being depressed x 1 month that is getting progressively worse, pt has chronic depression and wants to be admitted. Pt denies suicidal or homicidal ideations at this time.

## 2018-12-13 NOTE — PROGRESS NOTE BEHAVIORAL HEALTH - MODIFICATIONS
seen/discussed with resident.  No s/h ideation. will continue to discuss and explore treatment options

## 2018-12-13 NOTE — ED ADULT NURSE NOTE - OBJECTIVE STATEMENT
33Y M hx of depression complaining of worsening depression x 1 month. Pt had a previous suicide attempt when he was 16 years old by OD. pt states " I would like to be admitted". pt cooperative and behaving appropriate, Pt denies suicidal ideations and plan at this time.

## 2018-12-13 NOTE — H&P ADULT - HISTORY OF PRESENT ILLNESS
32yo with a hx of depression with 1 suicide attempt and multiple IPP admissions, presented to ED for worsening anxiety with associated amotivation, depressed mood, decreased appetite, difficulty maintaining sleep despite a sleep regimen, and hopelessness.  He denies suicidal ideation, HI/AH. Patient medically cleared in ED for psych admission.

## 2018-12-13 NOTE — H&P ADULT - NSHPLABSRESULTS_GEN_ALL_CORE
14.0   8.05  )-----------( 253      ( 12 Dec 2018 21:30 )             41.2           139  |  100  |  15  ----------------------------<  90  3.9   |  24  |  0.8    Ca    9.7      12 Dec 2018 21:30    TPro  6.8  /  Alb  4.8  /  TBili  0.2  /  DBili  x   /  AST  20  /  ALT  30  /  AlkPhos  98                Urinalysis Basic - ( 12 Dec 2018 20:55 )    Color: Yellow / Appearance: Turbid / S.025 / pH: x  Gluc: x / Ketone: Negative  / Bili: Negative / Urobili: 1.0 mg/dL   Blood: x / Protein: Negative mg/dL / Nitrite: Negative   Leuk Esterase: Negative / RBC: x / WBC x   Sq Epi: x / Non Sq Epi: x / Bacteria: Few /HPF

## 2018-12-13 NOTE — H&P ADULT - NSHPPHYSICALEXAM_GEN_ALL_CORE
Vital Signs Last 24 Hrs  T(C): 36.2 (13 Dec 2018 12:15), Max: 36.4 (12 Dec 2018 19:29)  T(F): 97.2 (13 Dec 2018 12:15), Max: 97.6 (12 Dec 2018 19:29)  HR: 74 (12 Dec 2018 19:29) (74 - 74)  BP: 144/78 (12 Dec 2018 19:29) (144/78 - 144/78)  BP(mean): --  RR: 18 (12 Dec 2018 19:29) (18 - 18)  SpO2: 100% (12 Dec 2018 19:29) (100% - 100%)    PHYSICAL EXAM:      Constitutional: A&Ox4  Respiratory: cta b/l  Cardiovascular: s1 s2 rrr  Gastrointestinal: soft nt  nd + bs no rebound or guarding  Genitourinary: no cva tenderness  Extremities: normal rom, no edema, calf tenderness  Neurological: no focal deficits  Skin: no rash

## 2018-12-13 NOTE — H&P ADULT - PROBLEM SELECTOR PLAN 2
c/w home meds--pt states prescribed lamictal 100mg bid and filling rx at [facility] [address] [facility] and so I called,  and there record indicate that the last rx was 25mg daily but was not even filled at that location. D/w with chucho attending to see pt today.

## 2018-12-14 LAB
CHOLEST SERPL-MCNC: 160 MG/DL — SIGNIFICANT CHANGE UP (ref 100–200)
ESTIMATED AVERAGE GLUCOSE: 103 MG/DL — SIGNIFICANT CHANGE UP (ref 68–114)
HBA1C BLD-MCNC: 5.2 % — SIGNIFICANT CHANGE UP (ref 4–5.6)
HDLC SERPL-MCNC: 38 MG/DL — LOW
LIPID PNL WITH DIRECT LDL SERPL: 116 MG/DL — SIGNIFICANT CHANGE UP (ref 4–129)
TOTAL CHOLESTEROL/HDL RATIO MEASUREMENT: 4.2 RATIO — SIGNIFICANT CHANGE UP (ref 4–5.5)
TRIGL SERPL-MCNC: 117 MG/DL — SIGNIFICANT CHANGE UP (ref 10–149)
TSH SERPL-MCNC: 1.89 UIU/ML — SIGNIFICANT CHANGE UP (ref 0.27–4.2)

## 2018-12-14 RX ORDER — FLUOXETINE HCL 10 MG
20 CAPSULE ORAL DAILY
Qty: 0 | Refills: 0 | Status: DISCONTINUED | OUTPATIENT
Start: 2018-12-14 | End: 2018-12-15

## 2018-12-14 RX ADMIN — Medication 1 MILLIGRAM(S): at 06:09

## 2018-12-14 RX ADMIN — MIRTAZAPINE 7.5 MILLIGRAM(S): 45 TABLET, ORALLY DISINTEGRATING ORAL at 20:21

## 2018-12-14 RX ADMIN — Medication 20 MILLIGRAM(S): at 06:04

## 2018-12-14 RX ADMIN — LAMOTRIGINE 100 MILLIGRAM(S): 25 TABLET, ORALLY DISINTEGRATING ORAL at 08:48

## 2018-12-14 RX ADMIN — LAMOTRIGINE 100 MILLIGRAM(S): 25 TABLET, ORALLY DISINTEGRATING ORAL at 20:23

## 2018-12-14 RX ADMIN — Medication 100 MILLIGRAM(S): at 20:21

## 2018-12-14 RX ADMIN — Medication 1 MILLIGRAM(S): at 20:23

## 2018-12-14 RX ADMIN — Medication 20 MILLIGRAM(S): at 20:23

## 2018-12-14 NOTE — DISCHARGE NOTE BEHAVIORAL HEALTH - NSBHDCMEDSFT_PSY_A_CORE
Prozac 20 mg PO Qdaily was started on 12/14. The patient previously had a response with this medication from [id number], however, he stopped it because "it stopped working." Despite this, he states that this is the medication that helped it most. He has tried numerous other SSRIs and didn't feel they helped. Prozac 20 mg PO Qdaily was started on 12/14. The patient previously had a response with this medication from [id number], however, he stopped it because "it stopped working." Despite this, he states that this is the medication that helped it most. He has tried numerous other SSRIs and didn't feel they helped. On 12/15, the patient refused Prozac stating that "it stopped working" over and over, so the weekend psychiatrist switched to Escitalopram 10 mg PO Qdaily. He will be discharged on 10 mg escitalopram Qdaily. Home medications were continued including, lamictal, buspar, vistaril, remeron, klonopin, and melatonin.

## 2018-12-14 NOTE — DISCHARGE NOTE BEHAVIORAL HEALTH - PAST PSYCHIATRIC HISTORY
Multiple prior IPP admissions since 2001 when he was first diagnosed with depression. Initial admission following suicide attempt via pill ingestion. Most recent a few months ago he was admitted to IPP in [place] for depression. Patient currently follows outpatient with therapist Priscilla Jensen and Dr. Mckeon (last seen 2-3 weeks ago). He reports that he has an appt with Dr. Springer at [place] on 12/20. Multiple prior IPP admissions since 2001 when he was first diagnosed with depression. Initial admission following suicide attempt via pill ingestion. Most recent a few months ago he was admitted to IPP in [place] for depression. Patient currently follows outpatient with therapist Priscilla Jensen and Dr. Mckeon (last seen 2-3 weeks ago). He reports that he has an appt with Dr. Springer at [place] on 12/20.    . He states he has completed CBT and DBT courses and that he has been in psychiatric and psychologic care for the last 17 years.

## 2018-12-14 NOTE — DISCHARGE NOTE BEHAVIORAL HEALTH - NSBHDCSWCOMMENTSFT_PSY_A_CORE
Discharge summary faxed to patient's outpatient providers at [facility] on this date, December 17, 2018 at # [phone]

## 2018-12-14 NOTE — DISCHARGE NOTE BEHAVIORAL HEALTH - NSBHDCADDR1FT_A_CORE
[facility]  [facility]   [address] [place] [id number]

## 2018-12-14 NOTE — DISCHARGE NOTE BEHAVIORAL HEALTH - NSBHDCRESPONSEFT_PSY_A_CORE
Juan was admitted on a 9.13 status for worsening of depressive sx. He consistently denied suicidal ideations throughout the hospitalization telling us that he "was not there yet" but that he wanted to come in preemptively to get help. He is in good communication with his outpatient providers and stated that they knew that he was coming in. On 12/14, the day after our initial assessment, he wrote a 72 hour letter saying that he wanted to leave the hospital. He showed some improvement in his mood throughout his stay, but mostly, he displayed an increase in his insight into the problematic nature of his,. Juan was admitted on a 9.13 status for worsening of depressive sx. He consistently denied suicidal ideations throughout the hospitalization telling us that he "was not there yet" but that he wanted to come in preemptively to get help. He is in good communication with his outpatient providers and stated that they knew that he was coming in. On 12/14, the day after our initial assessment, he wrote a 72 hour letter saying that he wanted to leave the hospital. He showed some improvement in his mood throughout his stay, but mostly, he displayed an increase in his insight into the need for follow up.

## 2018-12-14 NOTE — CONSULT NOTE ADULT - ASSESSMENT
seizure cont lamicta      Imp psych check b12 folate tsh  psych to follow up    moniter for signs/symptoms of constipation and then consider adding mom prn

## 2018-12-14 NOTE — CONSULT NOTE ADULT - SUBJECTIVE AND OBJECTIVE BOX
ROXANA BOWEN  33y  Male      Patient is a 33y old  Male who presents with a chief complaint of       PAST MEDICAL/SURGICAL HISTORY  PAST MEDICAL & SURGICAL HISTORY:  Seizures  No significant past surgical history      REVIEW OF SYSTEMS:  CONSTITUTIONAL: No fever, weight loss, or fatigue  EYES: No eye pain, visual disturbances, or discharge  ENMT:  No difficulty hearing, tinnitus, vertigo; No sinus or throat pain  NECK: No pain or stiffness  RESPIRATORY: No cough, wheezing, chills or hemoptysis; No shortness of breath  CARDIOVASCULAR: No chest pain, palpitations, dizziness, or leg swelling  GASTROINTESTINAL: No abdominal or epigastric pain. No nausea, vomiting, or hematemesis; No diarrhea or constipation. No melena or hematochezia.  GENITOURINARY: No dysuria, frequency, hematuria, or incontinence    ALLERY AND IMMUNOLOGIC: No hives or eczema    busPIRone 20 milliGRAM(s) Oral two times a day  clonazePAM Tablet 1 milliGRAM(s) Oral two times a day  lamoTRIgine 100 milliGRAM(s) Oral two times a day  melatonin 3 milliGRAM(s) Oral at bedtime PRN  mirtazapine 7.5 milliGRAM(s) Oral at bedtime  nicotine  Polacrilex Gum 2 milliGRAM(s) Oral every 2 hours PRN  traZODone 100 milliGRAM(s) Oral at bedtime      T(C): 36.4 (12-14-18 @ 11:06), Max: 36.8 (12-13-18 @ 19:39)  HR: 72 (12-14-18 @ 11:06) (60 - 83)  BP: 105/61 (12-14-18 @ 11:06) (99/48 - 131/60)  RR: 17 (12-14-18 @ 11:06) (16 - 18)  SpO2: --  Wt(kg): --Vital Signs Last 24 Hrs  T(C): 36.4 (14 Dec 2018 11:06), Max: 36.8 (13 Dec 2018 19:39)  T(F): 97.5 (14 Dec 2018 11:06), Max: 98.2 (13 Dec 2018 19:39)  HR: 72 (14 Dec 2018 11:06) (60 - 83)  BP: 105/61 (14 Dec 2018 11:06) (99/48 - 131/60)  BP(mean): --  RR: 17 (14 Dec 2018 11:06) (16 - 18)  SpO2: --    PHYSICAL EXAM:  GENERAL: NAD, well-groomed, well-developed  HEAD:  Atraumatic, Normocephalic  EYES: EOMI, PERRLA, conjunctiva and sclera clear  ENMT: No tonsillar erythema, exudates, or enlargement; Moist mucous membranes, Good dentition, No lesions  NECK: Supple, No JVD, Normal thyroid  NERVOUS SYSTEM:  Alert & Oriented X3, Good concentration; Motor Strength 5/5 B/L upper and lower extremities; DTRs 2+ intact and symmetric  CHEST/LUNG: Clear to percussion bilaterally; No rales, rhonchi, wheezing, or rubs  HEART: Regular rate and rhythm; No murmurs, rubs, or gallops  ABDOMEN: Soft, Nontender, Nondistended; Bowel sounds present  EXTREMITIES:  2+ Peripheral Pulses, No clubbing, cyanosis, or edema  LYMPH: No lymphadenopathy noted  SKIN: No rashes or lesions      LABS:  CBC       BMP  12-12-18 @ 21:30  Anion Gap. Serum 15  Blood Urea Nitrogen,Serm 15  Calcium, Total Serum 9.7  Carbon Dioxide, Serum 24  Chloride, Serum 100  Creatinine, Serum 0.8  eGFR in  137  eGFR in Non Afican American 118  Gloucose, serum 90  Potassium, Serum 3.9  Sodium, Serum 139                  CMP  12-12-18 @ 21:30  Kelli Aminotransferase(ALT/SGPT)30  Albumin, Serum 4.8  Alkaline Phosphatase, Serum 98  Anion Gap, Serum 15  Aspartate Aminotransferase (AST/SGOT)20  Bilirubin Total, Serum 0.2  Blood Urea Nitrogen, Serum 15  Calcium,Total Serum 9.7  Carbon Dioxide, Serum 24  Chloride, Serum 100  Creatinine, Serum 0.8  eGFR if  137  eGFR if Non African American 118  Glucose, Serum 90  Potassium, Serum 3.9  Protein Total, Serum 6.8  Sodium, Serum 139                          PT/INR      Amylase/Lipase            RADIOLOGY & ADDITIONAL TESTS:    Imaging Personally Reviewed:  [ ] YES  [ ] NO

## 2018-12-14 NOTE — DISCHARGE NOTE BEHAVIORAL HEALTH - CARE PROVIDER_API CALL
[facility], [facility] [place] [address]  Phone: [phone]  Fax: [phone]    Priscilla Jensen  [facility] [address] [place] [address]  Phone: [phone]  Fax: [phone]    Goldberg, Dr.  [facility] [address] [place], [place] [address]  Phone: [phone]  Fax: [phone]

## 2018-12-14 NOTE — DISCHARGE NOTE BEHAVIORAL HEALTH - PROVIDER TOKENS
FREE:[LAST:[[facility]],FIRST:[[facility]],PHONE:[phone]],FAX:[phone]],ADDRESS:[[address] [place] [address]]],FREE:[LAST:[Mikey],FIRST:[Priscilla],PHONE:[phone]],FAX:[phone]],ADDRESS:[[facility] [address] [place] [address]]],FREE:[LAST:[Goldberg],FIRST:[],PHONE:[phone]],FAX:[phone]],ADDRESS:[[facility] [address] [place], [place] [address]]]

## 2018-12-14 NOTE — DISCHARGE NOTE BEHAVIORAL HEALTH - NSBHDCADDR2FT_A_CORE
[facility]  [facility]   [address] [place] [id number]
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
## 2018-12-14 NOTE — CHART NOTE - NSCHARTNOTEFT_GEN_A_CORE
Pt. is a 33 year old,  male who was admitted to the unit due to his struggle with depression.  He denies having any suicidal or homicidal ideations.  Pt. currently attends outpatient mental health services.  He had difficulty recalling the name of his therapist, "Miss Holder".  It appears he may be attending the [facility] clinic.  He reports having an appointment scheduled for 12/18 and a psychiatrist appointment scheduled as well.  Pt. refused to allow any member of the treatment team consent to contact his long-time partner or any other family member.  After meeting with pt., he submitted a 72-hour letter for discharge.    Pt. is not yet stable enough for discharge at this time.

## 2018-12-14 NOTE — DISCHARGE NOTE BEHAVIORAL HEALTH - MEDICATION SUMMARY - MEDICATIONS TO TAKE
I will START or STAY ON the medications listed below when I get home from the hospital:    lamoTRIgine 100 mg oral tablet  -- 1 tab(s) by mouth 2 times a day  -- Indication: For Seizure disorder    clonazePAM 1 mg oral tablet  -- 1 tab(s) by mouth 2 times a day  -- Indication: For anxiety    escitalopram 10 mg oral tablet  -- 1 tab(s) by mouth once a day  -- Indication: For Major depressive disorder    mirtazapine 7.5 mg oral tablet  -- 1 tab(s) by mouth once a day (at bedtime)  -- Indication: For Major Depressive disorder and insomnia    traZODone 100 mg oral tablet  -- 1 tab(s) by mouth once a day (at bedtime)  -- Indication: For Insomnia    busPIRone 10 mg oral tablet  -- 2 tab(s) by mouth 2 times a day  -- Indication: For anxiety    hydrOXYzine hydrochloride 50 mg oral tablet  -- 1 tab(s) by mouth every 6 hours, As needed, Anxiety  -- Indication: For anxiety    sodium chloride 0.65% nasal spray  --  into nose   -- Indication: For allergies    melatonin 3 mg oral tablet  -- 1 tab(s) by mouth once a day (at bedtime), As needed, Insomnia  -- Indication: For insomnia

## 2018-12-14 NOTE — PROGRESS NOTE ADULT - SUBJECTIVE AND OBJECTIVE BOX
pt submitted 72 hr letter; will proceed with treatment and d/c on 12/17, as pt does not meet criteria for involuntary hospitalization

## 2018-12-15 LAB
FOLATE SERPL-MCNC: 7.6 NG/ML — SIGNIFICANT CHANGE UP
VIT B12 SERPL-MCNC: 462 PG/ML — SIGNIFICANT CHANGE UP (ref 232–1245)

## 2018-12-15 RX ORDER — ESCITALOPRAM OXALATE 10 MG/1
10 TABLET, FILM COATED ORAL DAILY
Qty: 0 | Refills: 0 | Status: DISCONTINUED | OUTPATIENT
Start: 2018-12-15 | End: 2018-12-17

## 2018-12-15 RX ADMIN — LAMOTRIGINE 100 MILLIGRAM(S): 25 TABLET, ORALLY DISINTEGRATING ORAL at 20:06

## 2018-12-15 RX ADMIN — Medication 20 MILLIGRAM(S): at 08:20

## 2018-12-15 RX ADMIN — Medication 20 MILLIGRAM(S): at 08:17

## 2018-12-15 RX ADMIN — Medication 1 MILLIGRAM(S): at 08:20

## 2018-12-15 RX ADMIN — Medication 1 MILLIGRAM(S): at 20:06

## 2018-12-15 RX ADMIN — LAMOTRIGINE 100 MILLIGRAM(S): 25 TABLET, ORALLY DISINTEGRATING ORAL at 08:17

## 2018-12-15 RX ADMIN — Medication 20 MILLIGRAM(S): at 20:07

## 2018-12-15 RX ADMIN — ESCITALOPRAM OXALATE 10 MILLIGRAM(S): 10 TABLET, FILM COATED ORAL at 14:10

## 2018-12-15 RX ADMIN — Medication 100 MILLIGRAM(S): at 20:07

## 2018-12-15 RX ADMIN — MIRTAZAPINE 7.5 MILLIGRAM(S): 45 TABLET, ORALLY DISINTEGRATING ORAL at 20:07

## 2018-12-16 RX ORDER — SODIUM CHLORIDE 0.65 %
2 AEROSOL, SPRAY (ML) NASAL EVERY 4 HOURS
Qty: 0 | Refills: 0 | Status: DISCONTINUED | OUTPATIENT
Start: 2018-12-16 | End: 2018-12-17

## 2018-12-16 RX ORDER — HYDROXYZINE HCL 10 MG
50 TABLET ORAL EVERY 6 HOURS
Qty: 0 | Refills: 0 | Status: DISCONTINUED | OUTPATIENT
Start: 2018-12-16 | End: 2018-12-17

## 2018-12-16 RX ADMIN — ESCITALOPRAM OXALATE 10 MILLIGRAM(S): 10 TABLET, FILM COATED ORAL at 10:04

## 2018-12-16 RX ADMIN — MIRTAZAPINE 7.5 MILLIGRAM(S): 45 TABLET, ORALLY DISINTEGRATING ORAL at 20:12

## 2018-12-16 RX ADMIN — Medication 20 MILLIGRAM(S): at 20:12

## 2018-12-16 RX ADMIN — Medication 2 SPRAY(S): at 12:52

## 2018-12-16 RX ADMIN — LAMOTRIGINE 100 MILLIGRAM(S): 25 TABLET, ORALLY DISINTEGRATING ORAL at 10:04

## 2018-12-16 RX ADMIN — LAMOTRIGINE 100 MILLIGRAM(S): 25 TABLET, ORALLY DISINTEGRATING ORAL at 20:11

## 2018-12-16 RX ADMIN — Medication 100 MILLIGRAM(S): at 20:11

## 2018-12-16 RX ADMIN — Medication 20 MILLIGRAM(S): at 07:06

## 2018-12-16 RX ADMIN — Medication 1 MILLIGRAM(S): at 20:12

## 2018-12-16 RX ADMIN — Medication 50 MILLIGRAM(S): at 17:06

## 2018-12-16 RX ADMIN — Medication 1 MILLIGRAM(S): at 07:06

## 2018-12-16 RX ADMIN — Medication 3 MILLIGRAM(S): at 02:12

## 2018-12-16 NOTE — PROGRESS NOTE BEHAVIORAL HEALTH - NSBHFUPINTERVALHXFT_PSY_A_CORE
Nursing reports no acute events overnight. Juan reports that he is being seen for depression but emphasizes that he was not ever suicidal or homicidal, rather relating to us that he just came in preemptively because he felt like he might get worse. In fact, he states that "I'm depressed, but not as bad as it has been." He states that he has been reflecting on the question I asked him which is "how can we help you get better?" States that he isn't sure but that he feels a medication adjustment would help. We discuss prozac 20 mg PO Qdaily, and although he is skeptical, he agrees as this was the most successful trial from 2001 - 2015, he stopped because he felt it stopped working, but given the past efficacy we agree it is reasonable. He states he has completed CBT and DBT courses and that he has been in psychiatric and psychologic care for the last 17 years.     Collateral: BRENT Barnhart [phone] - spoke with Therapist Priscilla Jensen who stated that she can't talk right now as she is with a patient, however, she can talk on Monday with direct extension [phone].
Pt was seen, and evaluated, and chart was reviewed. No acute events overnight.  Patient is alert, calm, cooperative, compliant with treatment. Patient is in good behavioral control.  Pt refuses Prozac. has agreed to start Lexapro or zoloft , we agreed on Lexapro.      ***Pt denies and presents no evidence for suicidal or homicidal ideas plans or intentions.    ***Pt slept well, and reports normal appetite and regular bowel movements. Pt to start Lexapro 10 mg po /d today.
Upon approach, Nathan is laying in bed with the lights off, shades closed. Throughout the interview he is hostile stating "look, I already talked to many people and they documented everything, if you want to know something just read the chart."     He states he has been feeling depressed so he came in. Confirms that his therapist is Priscilla Jensen and his psychiatrist is Dr. Mckeon, both of which he told he is coming to the hospital. He states that he is here for "a medication change", when asked what might help him he sarcastically states "If I knew I wouldn't be here would I?" States he recently moved to [place] with "my partner" who he has been with for the last 17 years. He states he has been depressed since his teenage years. Does endorse a history of response to Prozac for several years which is the medication which worked the best for him.  Does endorse a shellfish allergy.     Psycho educated on time it takes for medication to work, importance of being active on the unit, importance of sleep hygiene.   Denies all suicidal ideation.     Collateral - Called Dr. Mckeon at [facility], several transfers from [facility] number, left message requested call back.  Could not find Priscilla Jensen number (therapist), called one in [place] but the line was not working.
Pt was seen, and evaluated, and chart was reviewed. No acute events overnight.  Patient is alert, calm, cooperative, compliant with treatment. Patient is in good behavioral control.  Pt refuses Prozac. has agreed to start Lexapro or zoloft , we agreed on Lexapro.      ***Pt denies and presents no evidence for suicidal or homicidal ideas plans or intentions.    ***Pt slept well, and reports normal appetite and regular bowel movements. Pt is on  Lexapro 10 mg po. He put 72 hrs letter  and as per staff , is going to be discharged

## 2018-12-16 NOTE — DIETITIAN INITIAL EVALUATION ADULT. - OTHER INFO
pt admitted with depression, tolerating po diet well consumes > 75% of all meals. pt not at nutritional risk at this time, please reconsult as needed
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
## 2018-12-16 NOTE — PROGRESS NOTE BEHAVIORAL HEALTH - PROBLEM SELECTOR PROBLEM 2
Moderate episode of recurrent major depressive disorder

## 2018-12-16 NOTE — DIETITIAN INITIAL EVALUATION ADULT. - PROBLEM SELECTOR PLAN 2
c/w home meds--pt states prescribed lamictal 100mg bid and filling rx at [facility] [address] [facility] and so I called,  and there record indicate that the last rx was 25mg daily but was not even filled at that location. D/w with chucho attending to see pt today.

## 2018-12-16 NOTE — PROGRESS NOTE BEHAVIORAL HEALTH - NSBHFUPINTERVALCCFT_PSY_A_CORE
"I feel depressed"
"I'm better. I can't take prozac"
"I'm depressed, what else can I tell you"
"I'm better."

## 2018-12-16 NOTE — PROGRESS NOTE BEHAVIORAL HEALTH - NSBHATTESTSEENBY_PSY_A_CORE
attending Psychiatrist without NP/Trainee
Attending Psychiatrist supervising NP/Trainee, meeting pt...
attending Psychiatrist without NP/Trainee
Attending Psychiatrist supervising NP/Trainee, meeting pt...

## 2018-12-16 NOTE — PROGRESS NOTE BEHAVIORAL HEALTH - NSBHCHARTREVIEWVS_PSY_A_CORE FT
Vital Signs Last 24 Hrs  T(C): 36.2 (13 Dec 2018 12:15), Max: 36.4 (12 Dec 2018 19:29)  T(F): 97.2 (13 Dec 2018 12:15), Max: 97.6 (12 Dec 2018 19:29)  HR: 74 (12 Dec 2018 19:29) (74 - 74)  BP: 144/78 (12 Dec 2018 19:29) (144/78 - 144/78)  BP(mean): --  RR: 18 (12 Dec 2018 19:29) (18 - 18)  SpO2: 100% (12 Dec 2018 19:29) (100% - 100%)
Vital Signs Last 24 Hrs  T(C): 36.4 (14 Dec 2018 11:06), Max: 36.8 (13 Dec 2018 19:39)  T(F): 97.5 (14 Dec 2018 11:06), Max: 98.2 (13 Dec 2018 19:39)  HR: 72 (14 Dec 2018 11:06) (60 - 83)  BP: 105/61 (14 Dec 2018 11:06) (99/48 - 131/60)  BP(mean): --  RR: 17 (14 Dec 2018 11:06) (16 - 18)  SpO2: --
T(C): 36 (12-15-18 @ 10:00), Max: 36.3 (12-14-18 @ 17:05)  HR: 92 (12-15-18 @ 10:00) (69 - 92)  BP: 131/66 (12-15-18 @ 10:00) (107/56 - 131/66)  RR: 20 (12-15-18 @ 10:00) (18 - 20)  SpO2: --
Vital Signs Last 24 Hrs  T(C): 37 (16 Dec 2018 06:42), Max: 37 (16 Dec 2018 06:42)  T(F): 98.6 (16 Dec 2018 06:42), Max: 98.6 (16 Dec 2018 06:42)  HR: 52 (16 Dec 2018 06:42) (52 - 52)  BP: 106/56 (16 Dec 2018 06:42) (106/56 - 106/56)  BP(mean): --  RR: 18 (16 Dec 2018 06:42) (18 - 18)  SpO2: --

## 2018-12-16 NOTE — PROGRESS NOTE BEHAVIORAL HEALTH - RISK ASSESSMENT
Risk factors: Acute mood episode, insomnia, feelings of depression   Protective factors: Social support from boyfriend, no suicidal ideations, no homicidal ideations, strong therapeutic support from Priscilla Jensen therapist at [facility] and Dr. Mckeon, Psychiatrist at [facility], future oriented toward relationship with boyfriend    Based on the above the patient has a latent risk which is mitigated by protective factors. Low risk for dangerous behavior. Discharge once outpatient follow up is established and patient shows he is tolerating Prozac well is reasonable.
Risk factors: Acute mood episode, insomnia, feelings of depression   Protective factors: Social support from boyfriend, no suicidal ideations, no homicidal ideations, strong therapeutic support from Priscilla Jensen therapist at [facility] and Dr. Mckeon, Psychiatrist at [facility], future oriented toward relationship with boyfriend    Based on the above the patient has a latent risk which is mitigated by protective factors. Low risk for dangerous behavior. Discharge once outpatient follow up is established and patient shows he is tolerating Prozac well is reasonable.
Risk factors: Acute mood episode, insomnia, feelings of depression   Protective factors: Social support from boyfriend, no suicidal ideations, no homicidal ideations, strong therapeutic support from Priscilla Jensen therapist at [facility] and Dr. Mckeon, Psychiatrist at [facility], future oriented toward relationship with boyfriend    Based on the above the patient has a latent risk which is mitigated by protective factors. Low risk for dangerous behavior. Discharge once outpatient follow up is established and patient shows he is tolerating Prozac well is reasonable.

## 2018-12-17 VITALS
HEART RATE: 67 BPM | TEMPERATURE: 97 F | DIASTOLIC BLOOD PRESSURE: 63 MMHG | SYSTOLIC BLOOD PRESSURE: 124 MMHG | RESPIRATION RATE: 19 BRPM

## 2018-12-17 RX ORDER — MIRTAZAPINE 45 MG/1
1 TABLET, ORALLY DISINTEGRATING ORAL
Qty: 0 | Refills: 0 | COMMUNITY
Start: 2018-12-17

## 2018-12-17 RX ORDER — SODIUM CHLORIDE 0.65 %
0 AEROSOL, SPRAY (ML) NASAL
Qty: 0 | Refills: 0 | COMMUNITY
Start: 2018-12-17

## 2018-12-17 RX ORDER — ESCITALOPRAM OXALATE 10 MG/1
1 TABLET, FILM COATED ORAL
Qty: 7 | Refills: 0 | OUTPATIENT
Start: 2018-12-17 | End: 2018-12-23

## 2018-12-17 RX ORDER — CLONAZEPAM 1 MG
1 TABLET ORAL
Qty: 0 | Refills: 0 | COMMUNITY
Start: 2018-12-17

## 2018-12-17 RX ORDER — LANOLIN ALCOHOL/MO/W.PET/CERES
1 CREAM (GRAM) TOPICAL
Qty: 0 | Refills: 0 | COMMUNITY
Start: 2018-12-17

## 2018-12-17 RX ORDER — LAMOTRIGINE 25 MG/1
1 TABLET, ORALLY DISINTEGRATING ORAL
Qty: 0 | Refills: 0 | COMMUNITY
Start: 2018-12-17

## 2018-12-17 RX ORDER — HYDROXYZINE HCL 10 MG
1 TABLET ORAL
Qty: 0 | Refills: 0 | COMMUNITY
Start: 2018-12-17

## 2018-12-17 RX ORDER — TRAZODONE HCL 50 MG
1 TABLET ORAL
Qty: 0 | Refills: 0 | COMMUNITY
Start: 2018-12-17

## 2018-12-17 RX ADMIN — Medication 1 MILLIGRAM(S): at 06:13

## 2018-12-17 RX ADMIN — ESCITALOPRAM OXALATE 10 MILLIGRAM(S): 10 TABLET, FILM COATED ORAL at 08:05

## 2018-12-17 RX ADMIN — Medication 20 MILLIGRAM(S): at 08:05

## 2018-12-17 RX ADMIN — LAMOTRIGINE 100 MILLIGRAM(S): 25 TABLET, ORALLY DISINTEGRATING ORAL at 08:05

## 2018-12-17 NOTE — CHART NOTE - NSCHARTNOTEFT_GEN_A_CORE
Social Work Discharge Note:    Patient is cleared for discharge today. Patient was admitted on 9.13 status for worsening symptoms of depression.  On 12/14, patient wrote 72 hour letter saying he wanted to leave the hospital.  Today, patient is alert and oriented x3.  Patient presents with cooperative behavior, and demonstrated improvement in his mood and has increased in insight/judgment.  Patient denies any SI, HI, or AV hallucinations, and reports no acute symptoms.  Patient has been compliant with treatment interventions, and will continue to follow up with outpatient behavioral health services at the [facility] at the [facility] (#[phone]).    Patient was provided with psycho-education about diagnosis, current medications, course of treatment, and follow up appointments.  Clinical team discussed risks, benefits, alternatives discussed, all questions and concerns addressed and answered.  Clinical team reviewed crisis intervention, and patient verbalized understanding.     Patient reports that he will be returning back to a residence in the community, listed as [address] [place] [address].    Patient is aware of discharge plan with continued treatment with outpatient behavioral health providers. Patient has appointment scheduled on December 18th 2018 at 3:00 pm with his outpatient therapist Priscilla Jensen of [facility] (#[phone]).  Patient has an appointment scheduled on December 20th, 2018 at 1:00 pm with his outpatient psychiatrist Dr. Goldberg at the [facility] at [facility].  Discharge summary will be faxed to behavioral health providers (#[phone]).     Patient is aware of discharge plan. Patient is agreeable to discharge today.

## 2018-12-17 NOTE — PROGRESS NOTE ADULT - SUBJECTIVE AND OBJECTIVE BOX
pt d/c'd today to home with f/u in place. No psychosis or s/h ideation. Mood pleasant. Pt had written 72hr letter and did not meet criteria for continued stay/emergency status

## 2018-12-19 DIAGNOSIS — H54.413A BLINDNESS RIGHT EYE CATEGORY 3, NORMAL VISION LEFT EYE: ICD-10-CM

## 2018-12-19 DIAGNOSIS — G40.909 EPILEPSY, UNSPECIFIED, NOT INTRACTABLE, WITHOUT STATUS EPILEPTICUS: ICD-10-CM

## 2018-12-19 DIAGNOSIS — Z87.820 PERSONAL HISTORY OF TRAUMATIC BRAIN INJURY: ICD-10-CM

## 2018-12-19 DIAGNOSIS — F12.99 CANNABIS USE, UNSPECIFIED WITH UNSPECIFIED CANNABIS-INDUCED DISORDER: ICD-10-CM

## 2018-12-19 DIAGNOSIS — F32.9 MAJOR DEPRESSIVE DISORDER, SINGLE EPISODE, UNSPECIFIED: ICD-10-CM

## 2018-12-19 DIAGNOSIS — F41.1 GENERALIZED ANXIETY DISORDER: ICD-10-CM

## 2018-12-19 DIAGNOSIS — F33.1 MAJOR DEPRESSIVE DISORDER, RECURRENT, MODERATE: ICD-10-CM

## 2019-01-29 NOTE — ED BEHAVIORAL HEALTH ASSESSMENT NOTE - AXIS IV
[FreeTextEntry1] : Impression/Plan:\par \par Dysphagia due to tonsillar lymphoma, resolved dysphagia on chemotherapy, s/p tracheostomy/PEG placement in early Dec 2018, now tolerating oral nutrition and hydration, pt requests G-tube removal.\par \par Pt explained risks of bleeding, infection, persistent gastrocutaneous fistula, abdominal pain, possible need for further procedures including endoscopic and surgical procedures. Patient understands agrees to proceed.\par \par Gastrostomy tube removed successfully at the bedside by traction.\par Area cleaned with wet gauze and dried.\par Covered with dry gauze and tape.\par \par Pt instructed to avoid eating/drinking for 1 hour.\par Keep area covered and dry for 48 hours.\par Call GI office for problems.
Problem related to social environment
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
## 2020-06-23 NOTE — PROGRESS NOTE BEHAVIORAL HEALTH - MUSCLE TONE / STRENGTH
Normal muscle tone/strength
Normal muscle tone/strength
done
Normal muscle tone/strength
Normal muscle tone/strength

## 2020-10-03 NOTE — PATIENT PROFILE BEHAVIORAL HEALTH - VISION (WITH CORRECTIVE LENSES IF THE PATIENT USUALLY WEARS THEM):
Normal vision: sees adequately in most situations; can see medication labels, newsprint
DR. BLOCH, ATTENDING MD-  I performed a face to face bedside interview with patient regarding history of present illness, review of symptoms and past medical history. I completed an independent physical exam.  I have discussed patient's plan of care with the resident.  Patient examined, well appearing NAD no CVA tenderness, abd soft nontender, pelvic supervised, no injuries noted, copious clear d/c. nuvaring intact no adnexal tenderness.

## 2021-10-25 NOTE — PROGRESS NOTE BEHAVIORAL HEALTH - EYE CONTACT
----- Message from Chayo Winkler sent at 10/25/2021  2:45 PM EDT -----  Subject: Message to Provider    QUESTIONS  Information for Provider? Pt is needing an in hand referral to see an   OBGYN she has an appt on 10/29/2021 she would like to know when she could   pick that up  ---------------------------------------------------------------------------  --------------  [address]  What is the best way for the office to contact you? OK to leave message on   voicemail  Preferred Call Back Phone Number? [phone]  ---------------------------------------------------------------------------  --------------  SCRIPT ANSWERS  Relationship to Patient?  Self
LVM earlier advising that referral would be at the  for . Please see last note. Please reach out to pt and let her know the Referral is ready for  at the  now.
Good

## 2022-03-18 PROBLEM — R56.9 SEIZURE (HCC): Status: ACTIVE | Noted: 2017-09-28

## 2022-03-18 PROBLEM — M75.102 LEFT ROTATOR CUFF TEAR: Status: ACTIVE | Noted: 2017-10-10

## 2022-03-19 PROBLEM — F17.200 NICOTINE DEPENDENCE: Status: ACTIVE | Noted: 2017-06-25

## 2022-03-19 PROBLEM — F12.90 MARIJUANA USE: Status: ACTIVE | Noted: 2017-06-25

## 2022-03-19 PROBLEM — I10 HTN (HYPERTENSION): Status: ACTIVE | Noted: 2017-09-28

## 2022-03-19 PROBLEM — Z98.890 HISTORY OF CRANIOTOMY: Status: ACTIVE | Noted: 2017-06-23

## 2022-03-19 PROBLEM — S06.9XAA TBI (TRAUMATIC BRAIN INJURY) (HCC): Status: ACTIVE | Noted: 2017-09-28

## 2022-03-19 PROBLEM — G40.409 SYMPTOMATIC GENERALIZED EPILEPSY (HCC): Status: ACTIVE | Noted: 2017-06-24

## 2022-03-19 PROBLEM — R56.9 SEIZURES (HCC): Status: ACTIVE | Noted: 2017-10-09

## 2022-03-19 PROBLEM — E78.5 HYPERLIPIDEMIA: Status: ACTIVE | Noted: 2017-09-28

## 2022-03-20 PROBLEM — G40.909 EPILEPSY (HCC): Status: ACTIVE | Noted: 2017-10-09

## 2022-03-20 PROBLEM — Z91.148 NON COMPLIANCE W MEDICATION REGIMEN: Status: ACTIVE | Noted: 2017-10-09

## 2022-09-30 NOTE — ED ADULT NURSE NOTE - AS [facility]
----- Message from Malu Simon sent at 2022  9:16 AM CDT -----  Carolee Cerrato  2021  Kevin Cerrato  2011  Edwin Cerrato  2014    Mom said she had some questions about a rash and some meds, call back number [phone].      Mom states Shashank and Carolee were supposed to have [facility] but [facility] states they don't have it. [facility] but was on hold, will try again later.     (4) no limitation

## 2023-05-16 NOTE — ED NOTES
MD does not suspect septic work up. Pt is cleared to go up.    Pt A&Ox4, slow & steady gait, following commands. Prefers to sit in chair outside of room #1 inside of lay in bed (rom #1) until bed available.

## 2023-06-22 ENCOUNTER — HOSPITAL ENCOUNTER (EMERGENCY)
Facility: HOSPITAL | Age: 38
Discharge: HOME | End: 2023-06-22
Attending: EMERGENCY MEDICINE

## 2023-06-22 VITALS
HEART RATE: 87 BPM | SYSTOLIC BLOOD PRESSURE: 143 MMHG | DIASTOLIC BLOOD PRESSURE: 96 MMHG | TEMPERATURE: 98.4 F | OXYGEN SATURATION: 95 %

## 2023-06-22 DIAGNOSIS — F43.21 ADJUSTMENT DISORDER WITH DEPRESSED MOOD: ICD-10-CM

## 2023-06-22 DIAGNOSIS — R45.851 SUICIDAL IDEATIONS: Primary | ICD-10-CM

## 2023-06-22 DIAGNOSIS — Z53.29 LEFT AGAINST MEDICAL ADVICE: ICD-10-CM

## 2023-06-22 PROCEDURE — 99285 EMERGENCY DEPT VISIT HI MDM: CPT

## 2023-06-22 RX ORDER — LORAZEPAM 1 MG/1
1 TABLET ORAL ONCE
Status: DISCONTINUED | OUTPATIENT
Start: 2023-06-22 | End: 2023-06-23 | Stop reason: HOSPADM

## 2023-06-22 ASSESSMENT — LIFESTYLE VARIABLES
HOW MANY STANDARD DRINKS CONTAINING ALCOHOL DO YOU HAVE ON A TYPICAL DAY: 1 OR 2
HOW OFTEN DO YOU HAVE A DRINK CONTAINING ALCOHOL: 2-4 TIMES A MONTH

## 2023-06-23 ASSESSMENT — ENCOUNTER SYMPTOMS
ABDOMINAL PAIN: 0
DIARRHEA: 0
SHORTNESS OF BREATH: 0
NAUSEA: 0
VOMITING: 0
EYE PAIN: 0
RHINORRHEA: 0
SORE THROAT: 0
COUGH: 0

## 2023-06-23 NOTE — DISCHARGE INSTRUCTIONS
[address] [facility],  [address] Providers    Accepts Insured Patients Only:  [facility]  [address]       [phone]   Near the corner of [facility] and [address] in the near [facility]. Accepts most insurance including Medicaid/Medicare. No psychiatry. On the [facility] bus line. [phone]  [address] [facility] ([address]  [address] [facility], [address] [place])     [phone]   Accepts most major insurances. Psychiatry available. Some DBT groups. [facility] [address] [facility])    [phone]   Mixture of psychologists and psychiatrists. They do not accept Medicaid or Medicare. The [facility] SPECIALTY \[facility] [place] [facility]\"" Group  [address] [place] [address]       [phone]   Mixture of clinical social workers and psychologists. Sliding Scale/Financial Aid/Differing Payment Options  [facility]  [address] [facility]      [phone]   Our own Kiafrica Mccabe and Jerryl Severs. Variety of treatment options, including DBT. [address]       [phone]   Provides a variety of group and individual counseling options. Insurance, Medicaid, Medicare and sliding scale      Medicaid/Medicare providers in the [address] area  [address] [place] [address]. [address]       [phone]    [facility]         [address]       [phone]    [address], [facility]    [phone] ex.  [address]     [phone]    [address]    [address] [facility]      [phone]      Services for patients without Medicaid, [place] or Insurance  The  [address]       [phone]   See handout in separate folder    An Jared Martinez [address]

## 2023-06-23 NOTE — ED PROVIDER NOTES
death). I have attempted to engage the patient and spouse in decision-making. I offered further follow-up here as needed. The patient was instructed to follow up with his Primary Care Provider within 24 hours. The patient understands that he is able to return here for further evaluation at anytime or if symptoms worsen. PLAN  1. Return precautions as discussed with patient and available family/caregiver. 2. DISCHARGE MEDICATIONS:     Medication List      You have not been prescribed any medications. 3. PATIENT REFERRED TO:  [facility] EM[facility]Y DEPT  [address] [place] [address] Drive  [address] [facility]  [phone]    As needed, If symptoms worsen                            CLINICAL IMPRESSION     1. Suicidal ideations    2. Left against medical advice    3. Adjustment disorder with depressed mood      Attestation:  I am the attending of record for this patient. I personally performed the services described in this documentation on this date, 6/22/2023 for patientPerri. I have reviewed the chart and verified that the record is accurate and complete.       Tessa Brown MD (Electronic Signature)         Tessa Brown MD  06/25/23 1640

## 2023-06-23 NOTE — ED NOTES
Called to bedside by patient for concerns of what the process is and what he is waiting for. This writer explained the next steps and patient agreed then stated never mind he would just go to [facility] and check himself in. During this interaction the patient had someone on FT and refused to get them off. This writer went to obtain the provider and told him the story. He advised they would come and speak w the patient and to reach out to crisis to see if the patient meets ECO criteria. Spoke w Cruz Ibarra and he advised if we thought he was going to harm himself then we would need to proceed w the ECO. MD made aware and patient is leaving AMA at this time.      Danae Murrell, RN  06/22/23 6001

## 2023-06-23 NOTE — ED NOTES
Patient requested to speak with  after speaking with ED charge RN. This RN along with attending ED provider Bharti Lang both spoke to patient, as patient voiced concerns. Patient stated he was not happy with the care that has been provided to him and that he was upset that he had been here for 1.5 hours and no lab work or anything had been done. Per primary RN, patient initially did not want IV for blood work and requested butterfly stick. Primary RN states she made MD aware of this who stated to wait for blood work until patient spoke with [facility]. Pt requesting to be discharged to be seen at another facility stating, \"it is my right\". MD explained the risks of leaving to patient. This RN apologized for patient not receiving the appropriate level of care. Patient was provided discharge paperwork and signed appropriate AMA paperwork with MD and this RN as witness. Patient was discharged with friend/family member safely out of facility.          Sweetie Boykin RN  06/22/23 1256

## 2023-06-23 NOTE — ED NOTES
Around 2215 the primary RN went to the patient's room to collect all lab work. The patient states that the primary RN can only attempt to stick one vein on his left hand on between the 4th and 5th fingers. Patient states he is an ultrasound IV stick and that he does not want the primary RN to look at any other veins. Primary RN went to start the IV in the specified vein and the patient pulls his hand back, stating he does not want an IV \"just for blood. \" Primary RN explains that we use an IV in case the patient was to need any medications/fluids while here, and that we could avoid future needlestick pokes. Patient makes wishes clear, RN offered to do a straight stick needle to obtain blood. Primary RN checked in with MD to update on patient status, and was advised by the MD to hold off on lab work until [facility] was able to talk with the patient. The primary RN also asked if MD wanted the patient to go ahead and receive ativan order at that time or if he wanted to wait until [facility] could talk with the patient. MD advised the primary RN to hold off on the ativan as to not disrupt the psych exam for [facility]. Upon time for the primary RN to check back in with the patient to update on the plan of care, and saw ED charge RN at bedside speaking with the patient. 2300: Patient has left AMA at this time.       Leopoldo Sapp RN  06/22/23 5440

## 2023-07-03 NOTE — ED ADULT TRIAGE NOTE - CHIEF COMPLAINT QUOTE
Pt came c/o being depressed x 1 month that is getting progressively worse, pt has chronic depression and wants to be admitted. Pt denies suicidal or homicidal ideations at this time. 03-Jul-2023 11:11

## 2023-07-13 ENCOUNTER — HOSPITAL ENCOUNTER (INPATIENT)
Facility: HOSPITAL | Age: 38
LOS: 4 days | Discharge: ANOTHER HOSPITAL | DRG: 751 | End: 2023-07-17
Attending: STUDENT IN AN ORGANIZED HEALTH CARE EDUCATION/TRAINING PROGRAM | Admitting: PSYCHIATRY & NEUROLOGY
Payer: MEDICAID

## 2023-07-13 DIAGNOSIS — F33.2 SEVERE EPISODE OF RECURRENT MAJOR DEPRESSIVE DISORDER, WITHOUT PSYCHOTIC FEATURES (HCC): ICD-10-CM

## 2023-07-13 DIAGNOSIS — R45.851 SUICIDAL IDEATIONS: Primary | ICD-10-CM

## 2023-07-13 PROBLEM — F32.9 MAJOR DEPRESSIVE DISORDER WITHOUT PSYCHOTIC FEATURES: Status: ACTIVE | Noted: 2023-07-13

## 2023-07-13 LAB
ALBUMIN SERPL-MCNC: 4 G/DL (ref 3.5–5)
ALBUMIN/GLOB SERPL: 1.1 (ref 1.1–2.2)
ALP SERPL-CCNC: 90 U/L (ref 45–117)
ALT SERPL-CCNC: 38 U/L (ref 12–78)
AMPHET UR QL SCN: NEGATIVE
ANION GAP SERPL CALC-SCNC: 4 MMOL/L (ref 5–15)
AST SERPL-CCNC: 16 U/L (ref 15–37)
BARBITURATES UR QL SCN: NEGATIVE
BASOPHILS # BLD: 0 K/UL (ref 0–0.1)
BASOPHILS NFR BLD: 1 % (ref 0–1)
BENZODIAZ UR QL: NEGATIVE
BILIRUB SERPL-MCNC: 0.4 MG/DL (ref 0.2–1)
BUN SERPL-MCNC: 13 MG/DL (ref 6–20)
BUN/CREAT SERPL: 16 (ref 12–20)
CALCIUM SERPL-MCNC: 9.1 MG/DL (ref 8.5–10.1)
CANNABINOIDS UR QL SCN: POSITIVE
CHLORIDE SERPL-SCNC: 108 MMOL/L (ref 97–108)
CO2 SERPL-SCNC: 28 MMOL/L (ref 21–32)
COCAINE UR QL SCN: NEGATIVE
COMMENT:: NORMAL
CREAT SERPL-MCNC: 0.83 MG/DL (ref 0.7–1.3)
DIFFERENTIAL METHOD BLD: NORMAL
EOSINOPHIL # BLD: 0.2 K/UL (ref 0–0.4)
EOSINOPHIL NFR BLD: 2 % (ref 0–7)
ERYTHROCYTE [DISTWIDTH] IN BLOOD BY AUTOMATED COUNT: 12.8 % (ref 11.5–14.5)
ETHANOL SERPL-MCNC: <10 MG/DL (ref 0–0.08)
FLUAV RNA SPEC QL NAA+PROBE: NOT DETECTED
FLUBV RNA SPEC QL NAA+PROBE: NOT DETECTED
GLOBULIN SER CALC-MCNC: 3.5 G/DL (ref 2–4)
GLUCOSE SERPL-MCNC: 100 MG/DL (ref 65–100)
HCT VFR BLD AUTO: 43.3 % (ref 36.6–50.3)
HGB BLD-MCNC: 14.5 G/DL (ref 12.1–17)
IMM GRANULOCYTES # BLD AUTO: 0 K/UL (ref 0–0.04)
IMM GRANULOCYTES NFR BLD AUTO: 0 % (ref 0–0.5)
LYMPHOCYTES # BLD: 2.8 K/UL (ref 0.8–3.5)
LYMPHOCYTES NFR BLD: 32 % (ref 12–49)
Lab: ABNORMAL
MCH RBC QN AUTO: 29.7 PG (ref 26–34)
MCHC RBC AUTO-ENTMCNC: 33.5 G/DL (ref 30–36.5)
MCV RBC AUTO: 88.5 FL (ref 80–99)
METHADONE UR QL: NEGATIVE
MONOCYTES # BLD: 0.8 K/UL (ref 0–1)
MONOCYTES NFR BLD: 9 % (ref 5–13)
NEUTS SEG # BLD: 5 K/UL (ref 1.8–8)
NEUTS SEG NFR BLD: 56 % (ref 32–75)
NRBC # BLD: 0 K/UL (ref 0–0.01)
NRBC BLD-RTO: 0 PER 100 WBC
OPIATES UR QL: NEGATIVE
PCP UR QL: NEGATIVE
PLATELET # BLD AUTO: 251 K/UL (ref 150–400)
PMV BLD AUTO: 10.3 FL (ref 8.9–12.9)
POTASSIUM SERPL-SCNC: 4 MMOL/L (ref 3.5–5.1)
PROT SERPL-MCNC: 7.5 G/DL (ref 6.4–8.2)
RBC # BLD AUTO: 4.89 M/UL (ref 4.1–5.7)
SARS-COV-2 RNA RESP QL NAA+PROBE: NOT DETECTED
SODIUM SERPL-SCNC: 140 MMOL/L (ref 136–145)
SPECIMEN HOLD: NORMAL
SPECIMEN HOLD: NORMAL
WBC # BLD AUTO: 8.8 K/UL (ref 4.1–11.1)

## 2023-07-13 PROCEDURE — 6360000002 HC RX W HCPCS: Performed by: STUDENT IN AN ORGANIZED HEALTH CARE EDUCATION/TRAINING PROGRAM

## 2023-07-13 PROCEDURE — 90791 PSYCH DIAGNOSTIC EVALUATION: CPT

## 2023-07-13 PROCEDURE — 99285 EMERGENCY DEPT VISIT HI MDM: CPT

## 2023-07-13 PROCEDURE — 1100000000 HC RM PRIVATE

## 2023-07-13 PROCEDURE — 87636 SARSCOV2 & INF A&B AMP PRB: CPT

## 2023-07-13 PROCEDURE — 80175 DRUG SCREEN QUAN LAMOTRIGINE: CPT

## 2023-07-13 PROCEDURE — 80177 DRUG SCRN QUAN LEVETIRACETAM: CPT

## 2023-07-13 PROCEDURE — 85025 COMPLETE CBC W/AUTO DIFF WBC: CPT

## 2023-07-13 PROCEDURE — 36415 COLL VENOUS BLD VENIPUNCTURE: CPT

## 2023-07-13 PROCEDURE — 82077 ASSAY SPEC XCP UR&BREATH IA: CPT

## 2023-07-13 PROCEDURE — 96374 THER/PROPH/DIAG INJ IV PUSH: CPT

## 2023-07-13 PROCEDURE — 6370000000 HC RX 637 (ALT 250 FOR IP): Performed by: STUDENT IN AN ORGANIZED HEALTH CARE EDUCATION/TRAINING PROGRAM

## 2023-07-13 PROCEDURE — 80053 COMPREHEN METABOLIC PANEL: CPT

## 2023-07-13 PROCEDURE — 80307 DRUG TEST PRSMV CHEM ANLYZR: CPT

## 2023-07-13 RX ORDER — LEVETIRACETAM 500 MG/5ML
1000 INJECTION, SOLUTION, CONCENTRATE INTRAVENOUS EVERY 12 HOURS
Status: DISCONTINUED | OUTPATIENT
Start: 2023-07-13 | End: 2023-07-14

## 2023-07-13 RX ORDER — LAMOTRIGINE 25 MG/1
50 TABLET ORAL
Status: COMPLETED | OUTPATIENT
Start: 2023-07-13 | End: 2023-07-13

## 2023-07-13 RX ADMIN — LAMOTRIGINE 50 MG: 25 TABLET ORAL at 16:07

## 2023-07-13 RX ADMIN — LEVETIRACETAM 1000 MG: 100 INJECTION, SOLUTION INTRAVENOUS at 16:07

## 2023-07-13 ASSESSMENT — PAIN DESCRIPTION - LOCATION: LOCATION: NECK

## 2023-07-13 ASSESSMENT — PAIN DESCRIPTION - DESCRIPTORS: DESCRIPTORS: POUNDING

## 2023-07-13 ASSESSMENT — LIFESTYLE VARIABLES
HOW MANY STANDARD DRINKS CONTAINING ALCOHOL DO YOU HAVE ON A TYPICAL DAY: 1 OR 2
HOW OFTEN DO YOU HAVE A DRINK CONTAINING ALCOHOL: MONTHLY OR LESS

## 2023-07-13 ASSESSMENT — PAIN SCALES - GENERAL: PAINLEVEL_OUTOF10: 9

## 2023-07-13 ASSESSMENT — PAIN DESCRIPTION - ORIENTATION: ORIENTATION: POSTERIOR

## 2023-07-14 LAB — LAMOTRIGINE SERPL-MCNC: 2.7 UG/ML (ref 2–20)

## 2023-07-14 PROCEDURE — 6370000000 HC RX 637 (ALT 250 FOR IP)

## 2023-07-14 PROCEDURE — 6370000000 HC RX 637 (ALT 250 FOR IP): Performed by: NURSE PRACTITIONER

## 2023-07-14 PROCEDURE — 1240000000 HC EMOTIONAL WELLNESS R&B

## 2023-07-14 PROCEDURE — 6370000000 HC RX 637 (ALT 250 FOR IP): Performed by: PSYCHIATRY & NEUROLOGY

## 2023-07-14 RX ORDER — ARIPIPRAZOLE 5 MG/1
5 TABLET ORAL DAILY
Status: DISCONTINUED | OUTPATIENT
Start: 2023-07-15 | End: 2023-07-15

## 2023-07-14 RX ORDER — MAGNESIUM HYDROXIDE/ALUMINUM HYDROXICE/SIMETHICONE 120; 1200; 1200 MG/30ML; MG/30ML; MG/30ML
30 SUSPENSION ORAL EVERY 6 HOURS PRN
Status: DISCONTINUED | OUTPATIENT
Start: 2023-07-14 | End: 2023-07-17 | Stop reason: HOSPADM

## 2023-07-14 RX ORDER — POLYETHYLENE GLYCOL 3350 17 G/17G
17 POWDER, FOR SOLUTION ORAL DAILY PRN
Status: DISCONTINUED | OUTPATIENT
Start: 2023-07-14 | End: 2023-07-17 | Stop reason: HOSPADM

## 2023-07-14 RX ORDER — TRAZODONE HYDROCHLORIDE 50 MG/1
50 TABLET ORAL NIGHTLY PRN
Status: DISCONTINUED | OUTPATIENT
Start: 2023-07-14 | End: 2023-07-16

## 2023-07-14 RX ORDER — LAMOTRIGINE 100 MG/1
200 TABLET ORAL 2 TIMES DAILY
Status: DISCONTINUED | OUTPATIENT
Start: 2023-07-14 | End: 2023-07-17 | Stop reason: HOSPADM

## 2023-07-14 RX ORDER — LEVETIRACETAM 500 MG/1
1000 TABLET ORAL 2 TIMES DAILY
Status: DISCONTINUED | OUTPATIENT
Start: 2023-07-14 | End: 2023-07-17 | Stop reason: HOSPADM

## 2023-07-14 RX ORDER — LAMOTRIGINE 100 MG/1
100 TABLET ORAL ONCE
Status: COMPLETED | OUTPATIENT
Start: 2023-07-14 | End: 2023-07-14

## 2023-07-14 RX ORDER — HALOPERIDOL 5 MG/ML
5 INJECTION INTRAMUSCULAR EVERY 4 HOURS PRN
Status: DISCONTINUED | OUTPATIENT
Start: 2023-07-14 | End: 2023-07-17 | Stop reason: HOSPADM

## 2023-07-14 RX ORDER — LEVETIRACETAM 1000 MG/1
1000 TABLET ORAL 2 TIMES DAILY
Status: ON HOLD | COMMUNITY
End: 2023-07-18 | Stop reason: SDUPTHER

## 2023-07-14 RX ORDER — LAMOTRIGINE 25 MG/1
50 TABLET ORAL DAILY
Status: DISCONTINUED | OUTPATIENT
Start: 2023-07-14 | End: 2023-07-14

## 2023-07-14 RX ORDER — HYDROXYZINE 50 MG/1
100 TABLET, FILM COATED ORAL 3 TIMES DAILY PRN
Status: DISCONTINUED | OUTPATIENT
Start: 2023-07-14 | End: 2023-07-17 | Stop reason: HOSPADM

## 2023-07-14 RX ORDER — ARIPIPRAZOLE 2 MG/1
2 TABLET ORAL ONCE
Status: COMPLETED | OUTPATIENT
Start: 2023-07-14 | End: 2023-07-14

## 2023-07-14 RX ORDER — HALOPERIDOL 5 MG/1
5 TABLET ORAL EVERY 4 HOURS PRN
Status: DISCONTINUED | OUTPATIENT
Start: 2023-07-14 | End: 2023-07-17 | Stop reason: HOSPADM

## 2023-07-14 RX ORDER — HYDROXYZINE 50 MG/1
50 TABLET, FILM COATED ORAL 3 TIMES DAILY PRN
Status: DISCONTINUED | OUTPATIENT
Start: 2023-07-14 | End: 2023-07-14

## 2023-07-14 RX ORDER — DIPHENHYDRAMINE HYDROCHLORIDE 50 MG/ML
50 INJECTION INTRAMUSCULAR; INTRAVENOUS EVERY 4 HOURS PRN
Status: DISCONTINUED | OUTPATIENT
Start: 2023-07-14 | End: 2023-07-17 | Stop reason: HOSPADM

## 2023-07-14 RX ORDER — SENNA PLUS 8.6 MG/1
1 TABLET ORAL DAILY PRN
Status: DISCONTINUED | OUTPATIENT
Start: 2023-07-14 | End: 2023-07-17 | Stop reason: HOSPADM

## 2023-07-14 RX ORDER — LAMOTRIGINE 200 MG/1
200 TABLET ORAL 2 TIMES DAILY
Status: ON HOLD | COMMUNITY
End: 2023-07-18 | Stop reason: SDUPTHER

## 2023-07-14 RX ORDER — POLYETHYLENE GLYCOL 3350 17 G
4 POWDER IN PACKET (EA) ORAL
Status: DISCONTINUED | OUTPATIENT
Start: 2023-07-14 | End: 2023-07-17 | Stop reason: HOSPADM

## 2023-07-14 RX ADMIN — LEVETIRACETAM 1000 MG: 500 TABLET, FILM COATED ORAL at 20:47

## 2023-07-14 RX ADMIN — HALOPERIDOL 5 MG: 5 TABLET ORAL at 20:47

## 2023-07-14 RX ADMIN — LAMOTRIGINE 50 MG: 25 TABLET ORAL at 10:18

## 2023-07-14 RX ADMIN — LAMOTRIGINE 100 MG: 100 TABLET ORAL at 13:57

## 2023-07-14 RX ADMIN — LAMOTRIGINE 200 MG: 100 TABLET ORAL at 20:47

## 2023-07-14 RX ADMIN — ARIPIPRAZOLE 2 MG: 2 TABLET ORAL at 13:58

## 2023-07-14 RX ADMIN — LEVETIRACETAM 1000 MG: 500 TABLET, FILM COATED ORAL at 10:18

## 2023-07-14 RX ADMIN — NICOTINE POLACRILEX 4 MG: 4 LOZENGE ORAL at 13:59

## 2023-07-14 RX ADMIN — HYDROXYZINE HYDROCHLORIDE 50 MG: 50 TABLET, FILM COATED ORAL at 10:21

## 2023-07-14 RX ADMIN — NICOTINE POLACRILEX 4 MG: 4 LOZENGE ORAL at 10:21

## 2023-07-14 ASSESSMENT — PAIN - FUNCTIONAL ASSESSMENT: PAIN_FUNCTIONAL_ASSESSMENT: NONE - DENIES PAIN

## 2023-07-14 ASSESSMENT — LIFESTYLE VARIABLES
HOW OFTEN DO YOU HAVE A DRINK CONTAINING ALCOHOL: NEVER
HOW MANY STANDARD DRINKS CONTAINING ALCOHOL DO YOU HAVE ON A TYPICAL DAY: PATIENT DOES NOT DRINK

## 2023-07-14 ASSESSMENT — SLEEP AND FATIGUE QUESTIONNAIRES
DO YOU HAVE DIFFICULTY SLEEPING: YES
AVERAGE NUMBER OF SLEEP HOURS: 3
SLEEP PATTERN: DIFFICULTY ARISING
DO YOU USE A SLEEP AID: NO

## 2023-07-14 ASSESSMENT — PATIENT HEALTH QUESTIONNAIRE - PHQ9: SUM OF ALL RESPONSES TO PHQ QUESTIONS 1-9: 8

## 2023-07-14 ASSESSMENT — PAIN SCALES - GENERAL: PAINLEVEL_OUTOF10: 0

## 2023-07-15 ENCOUNTER — APPOINTMENT (OUTPATIENT)
Facility: HOSPITAL | Age: 38
DRG: 751 | End: 2023-07-15
Payer: MEDICAID

## 2023-07-15 LAB
EKG ATRIAL RATE: 58 BPM
EKG DIAGNOSIS: NORMAL
EKG P AXIS: 53 DEGREES
EKG P-R INTERVAL: 158 MS
EKG Q-T INTERVAL: 422 MS
EKG QRS DURATION: 100 MS
EKG QTC CALCULATION (BAZETT): 414 MS
EKG R AXIS: -13 DEGREES
EKG T AXIS: 8 DEGREES
EKG VENTRICULAR RATE: 58 BPM
LEVETIRACETAM SERPL-MCNC: 2.4 UG/ML (ref 10–40)

## 2023-07-15 PROCEDURE — 6370000000 HC RX 637 (ALT 250 FOR IP): Performed by: NURSE PRACTITIONER

## 2023-07-15 PROCEDURE — 6370000000 HC RX 637 (ALT 250 FOR IP)

## 2023-07-15 PROCEDURE — 6370000000 HC RX 637 (ALT 250 FOR IP): Performed by: PSYCHIATRY & NEUROLOGY

## 2023-07-15 PROCEDURE — 1240000000 HC EMOTIONAL WELLNESS R&B

## 2023-07-15 PROCEDURE — 71045 X-RAY EXAM CHEST 1 VIEW: CPT

## 2023-07-15 RX ORDER — ARIPIPRAZOLE 10 MG/1
10 TABLET ORAL DAILY
Status: DISCONTINUED | OUTPATIENT
Start: 2023-07-16 | End: 2023-07-16

## 2023-07-15 RX ADMIN — LEVETIRACETAM 1000 MG: 500 TABLET, FILM COATED ORAL at 09:02

## 2023-07-15 RX ADMIN — HALOPERIDOL 5 MG: 5 TABLET ORAL at 21:18

## 2023-07-15 RX ADMIN — LAMOTRIGINE 200 MG: 100 TABLET ORAL at 21:17

## 2023-07-15 RX ADMIN — NICOTINE POLACRILEX 4 MG: 4 LOZENGE ORAL at 09:02

## 2023-07-15 RX ADMIN — ARIPIPRAZOLE 5 MG: 5 TABLET ORAL at 09:02

## 2023-07-15 RX ADMIN — LAMOTRIGINE 200 MG: 100 TABLET ORAL at 09:02

## 2023-07-15 RX ADMIN — LEVETIRACETAM 1000 MG: 500 TABLET, FILM COATED ORAL at 21:17

## 2023-07-15 ASSESSMENT — PAIN - FUNCTIONAL ASSESSMENT: PAIN_FUNCTIONAL_ASSESSMENT: NONE - DENIES PAIN

## 2023-07-15 ASSESSMENT — PAIN SCALES - GENERAL: PAINLEVEL_OUTOF10: 0

## 2023-07-16 LAB
CHOLEST SERPL-MCNC: 232 MG/DL
EST. AVERAGE GLUCOSE BLD GHB EST-MCNC: 97 MG/DL
HBA1C MFR BLD: 5 % (ref 4–5.6)
HDLC SERPL-MCNC: 35 MG/DL
HDLC SERPL: 6.6 (ref 0–5)
LDLC SERPL CALC-MCNC: 140 MG/DL (ref 0–100)
TRIGL SERPL-MCNC: 285 MG/DL
VLDLC SERPL CALC-MCNC: 57 MG/DL

## 2023-07-16 PROCEDURE — 6370000000 HC RX 637 (ALT 250 FOR IP): Performed by: PSYCHIATRY & NEUROLOGY

## 2023-07-16 PROCEDURE — 6370000000 HC RX 637 (ALT 250 FOR IP): Performed by: NURSE PRACTITIONER

## 2023-07-16 PROCEDURE — 83036 HEMOGLOBIN GLYCOSYLATED A1C: CPT

## 2023-07-16 PROCEDURE — 1240000000 HC EMOTIONAL WELLNESS R&B

## 2023-07-16 PROCEDURE — 6370000000 HC RX 637 (ALT 250 FOR IP)

## 2023-07-16 PROCEDURE — 80061 LIPID PANEL: CPT

## 2023-07-16 PROCEDURE — 36415 COLL VENOUS BLD VENIPUNCTURE: CPT

## 2023-07-16 RX ORDER — DOXEPIN HYDROCHLORIDE 25 MG/1
25 CAPSULE ORAL NIGHTLY
Status: DISCONTINUED | OUTPATIENT
Start: 2023-07-16 | End: 2023-07-17 | Stop reason: HOSPADM

## 2023-07-16 RX ADMIN — NICOTINE POLACRILEX 4 MG: 4 LOZENGE ORAL at 21:25

## 2023-07-16 RX ADMIN — DOXEPIN HYDROCHLORIDE 25 MG: 25 CAPSULE ORAL at 21:24

## 2023-07-16 RX ADMIN — LEVETIRACETAM 1000 MG: 500 TABLET, FILM COATED ORAL at 21:23

## 2023-07-16 RX ADMIN — NICOTINE POLACRILEX 4 MG: 4 LOZENGE ORAL at 17:33

## 2023-07-16 RX ADMIN — ARIPIPRAZOLE 10 MG: 10 TABLET ORAL at 09:20

## 2023-07-16 RX ADMIN — LEVETIRACETAM 1000 MG: 500 TABLET, FILM COATED ORAL at 09:20

## 2023-07-16 RX ADMIN — LAMOTRIGINE 200 MG: 100 TABLET ORAL at 21:24

## 2023-07-16 RX ADMIN — LAMOTRIGINE 200 MG: 100 TABLET ORAL at 09:20

## 2023-07-16 RX ADMIN — HYDROXYZINE HYDROCHLORIDE 100 MG: 50 TABLET, FILM COATED ORAL at 20:08

## 2023-07-16 RX ADMIN — NICOTINE POLACRILEX 4 MG: 4 LOZENGE ORAL at 19:33

## 2023-07-16 ASSESSMENT — PAIN - FUNCTIONAL ASSESSMENT: PAIN_FUNCTIONAL_ASSESSMENT: NONE - DENIES PAIN

## 2023-07-16 ASSESSMENT — PAIN SCALES - GENERAL: PAINLEVEL_OUTOF10: 0

## 2023-07-17 ENCOUNTER — HOSPITAL ENCOUNTER (INPATIENT)
Facility: HOSPITAL | Age: 38
LOS: 1 days | Discharge: HOME | DRG: 754 | End: 2023-07-18
Attending: PSYCHIATRY & NEUROLOGY | Admitting: PSYCHIATRY & NEUROLOGY
Payer: MEDICAID

## 2023-07-17 VITALS
HEIGHT: 70 IN | SYSTOLIC BLOOD PRESSURE: 150 MMHG | TEMPERATURE: 98 F | WEIGHT: 260.7 LBS | BODY MASS INDEX: 37.32 KG/M2 | RESPIRATION RATE: 16 BRPM | OXYGEN SATURATION: 96 % | DIASTOLIC BLOOD PRESSURE: 90 MMHG | HEART RATE: 86 BPM

## 2023-07-17 PROCEDURE — 6370000000 HC RX 637 (ALT 250 FOR IP)

## 2023-07-17 PROCEDURE — 6370000000 HC RX 637 (ALT 250 FOR IP): Performed by: PSYCHIATRY & NEUROLOGY

## 2023-07-17 PROCEDURE — 1240000000 HC EMOTIONAL WELLNESS R&B

## 2023-07-17 PROCEDURE — 6370000000 HC RX 637 (ALT 250 FOR IP): Performed by: NURSE PRACTITIONER

## 2023-07-17 RX ORDER — POLYETHYLENE GLYCOL 3350 17 G
4 POWDER IN PACKET (EA) ORAL
Qty: 100 EACH | Refills: 0 | Status: ON HOLD
Start: 2023-07-17 | End: 2023-07-18 | Stop reason: HOSPADM

## 2023-07-17 RX ORDER — HALOPERIDOL 5 MG/1
5 TABLET ORAL EVERY 4 HOURS PRN
Status: DISCONTINUED | OUTPATIENT
Start: 2023-07-17 | End: 2023-07-18 | Stop reason: HOSPADM

## 2023-07-17 RX ORDER — TRAZODONE HYDROCHLORIDE 50 MG/1
50 TABLET ORAL NIGHTLY PRN
Status: DISCONTINUED | OUTPATIENT
Start: 2023-07-17 | End: 2023-07-18 | Stop reason: HOSPADM

## 2023-07-17 RX ORDER — ARIPIPRAZOLE 15 MG/1
15 TABLET ORAL DAILY
Qty: 30 TABLET | Refills: 0 | Status: ON HOLD
Start: 2023-07-18 | End: 2023-07-18 | Stop reason: SDUPTHER

## 2023-07-17 RX ORDER — MAGNESIUM HYDROXIDE/ALUMINUM HYDROXICE/SIMETHICONE 120; 1200; 1200 MG/30ML; MG/30ML; MG/30ML
30 SUSPENSION ORAL EVERY 6 HOURS PRN
Status: DISCONTINUED | OUTPATIENT
Start: 2023-07-17 | End: 2023-07-18 | Stop reason: HOSPADM

## 2023-07-17 RX ORDER — HYDROXYZINE HYDROCHLORIDE 25 MG/1
50 TABLET, FILM COATED ORAL 3 TIMES DAILY PRN
Status: DISCONTINUED | OUTPATIENT
Start: 2023-07-17 | End: 2023-07-18 | Stop reason: HOSPADM

## 2023-07-17 RX ORDER — ACETAMINOPHEN 325 MG/1
650 TABLET ORAL EVERY 4 HOURS PRN
Status: DISCONTINUED | OUTPATIENT
Start: 2023-07-17 | End: 2023-07-18 | Stop reason: HOSPADM

## 2023-07-17 RX ORDER — POLYETHYLENE GLYCOL 3350 17 G/17G
17 POWDER, FOR SOLUTION ORAL DAILY PRN
Status: DISCONTINUED | OUTPATIENT
Start: 2023-07-17 | End: 2023-07-18 | Stop reason: HOSPADM

## 2023-07-17 RX ORDER — POLYETHYLENE GLYCOL 3350 17 G
2 POWDER IN PACKET (EA) ORAL
Status: DISCONTINUED | OUTPATIENT
Start: 2023-07-17 | End: 2023-07-18 | Stop reason: HOSPADM

## 2023-07-17 RX ORDER — HALOPERIDOL 5 MG/ML
5 INJECTION INTRAMUSCULAR EVERY 4 HOURS PRN
Status: DISCONTINUED | OUTPATIENT
Start: 2023-07-17 | End: 2023-07-18 | Stop reason: HOSPADM

## 2023-07-17 RX ORDER — DIPHENHYDRAMINE HYDROCHLORIDE 50 MG/ML
50 INJECTION INTRAMUSCULAR; INTRAVENOUS EVERY 4 HOURS PRN
Status: DISCONTINUED | OUTPATIENT
Start: 2023-07-17 | End: 2023-07-18 | Stop reason: HOSPADM

## 2023-07-17 RX ORDER — DOXEPIN HYDROCHLORIDE 25 MG/1
25 CAPSULE ORAL NIGHTLY
Qty: 30 CAPSULE | Refills: 0 | Status: ON HOLD
Start: 2023-07-17 | End: 2023-07-18 | Stop reason: SDUPTHER

## 2023-07-17 RX ADMIN — NICOTINE POLACRILEX 4 MG: 4 LOZENGE ORAL at 13:14

## 2023-07-17 RX ADMIN — LAMOTRIGINE 200 MG: 100 TABLET ORAL at 08:51

## 2023-07-17 RX ADMIN — NICOTINE POLACRILEX 2 MG: 2 LOZENGE ORAL at 20:40

## 2023-07-17 RX ADMIN — ARIPIPRAZOLE 15 MG: 10 TABLET ORAL at 08:51

## 2023-07-17 RX ADMIN — NICOTINE POLACRILEX 4 MG: 4 LOZENGE ORAL at 14:37

## 2023-07-17 RX ADMIN — LEVETIRACETAM 1000 MG: 500 TABLET, FILM COATED ORAL at 08:51

## 2023-07-17 ASSESSMENT — PAIN - FUNCTIONAL ASSESSMENT: PAIN_FUNCTIONAL_ASSESSMENT: NONE - DENIES PAIN

## 2023-07-17 ASSESSMENT — LIFESTYLE VARIABLES
HOW MANY STANDARD DRINKS CONTAINING ALCOHOL DO YOU HAVE ON A TYPICAL DAY: PATIENT DOES NOT DRINK
HOW OFTEN DO YOU HAVE A DRINK CONTAINING ALCOHOL: NEVER

## 2023-07-17 ASSESSMENT — SLEEP AND FATIGUE QUESTIONNAIRES
DO YOU HAVE DIFFICULTY SLEEPING: NO
AVERAGE NUMBER OF SLEEP HOURS: 6
DO YOU USE A SLEEP AID: NO

## 2023-07-17 ASSESSMENT — PAIN SCALES - GENERAL: PAINLEVEL_OUTOF10: 0
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
## 2023-07-17 NOTE — BH NOTE
Plan to transfer to [facility] for ECT - [facility] to pickup at 3:15 going to bed 320-02 at [facility] accepted by Jostin Carrizales NP

## 2023-07-17 NOTE — PROGRESS NOTES
TRANSFER - OUT REPORT:    Verbal report given to [address] [place] [address] on Jorge Yang  being transferred to [address] [place] [address] for routine progression of patient care   and ECT    Report consisted of patient's Situation, Background, Assessment and   Recommendations(SBAR). Information from the following report(s) Nurse Handoff Report was reviewed with the receiving nurse. Lines:       Opportunity for questions and clarification was provided.       Patient transported with: [facility]

## 2023-07-17 NOTE — INTERDISCIPLINARY ROUNDS
Behavioral Health Interdisciplinary Rounds     Patient Name: Dwight Soliman  Age: 40 y.o. Room/Bed:  731/02  Primary Diagnosis: Major depressive disorder without psychotic features   Admission Status: Voluntary    Readmission within 30 days: No  Power of  in place: No  Patient requires a blocked bed: No            Sleep hours: 6      Participation in Care/Groups:  Yes  Medication Compliant?: Yes  PRNS (last 24 hours): Antianxiety   Restraints (last 24 hours):  No  __________________________________________________  OQ Admission Analysis Survey completed:yes  OQ Admission Analysis Survey score:57  __________________________________________________     Alcohol screening (AUDIT) completed -     If applicable, date SBIRT discussed in treatment team AND documented:    Tobacco -:    Illegal Drugs use:      24 hour chart check complete: Yes    _______________________________________________    Patient goal(s) for today: meet with treatment team  Treatment team focus/goals: Plan to transfer to [facility] for ECT   Progress note:    He reports still being very depressed, denies any issues with his medications, and is looking forward to starting ECT   Spiritual Care Consult: no  Financial concerns/prescription coverage:  Medicaid   Family contact:                        Family requesting physician contact today:    Discharge plan: he will return to his home   Access to weapons :   no                                                           Outpatient provider(s): ECT at [facility]      LOS:  4  Expected LOS: possible discharge to [facility] today     Participating treatment team members: Kj Gill, RN - Herb Villagomez, PharmD.

## 2023-07-17 NOTE — DISCHARGE SUMMARY
Barbara Rutherford the nurse from [facility] what to know from [address] [place] [address],Sixth Floor what labs are needed for pt  [facility] [phone] DISCHARGE SUMMARY    Some parts of the discharge summary are from the initial Psychiatric interview that was done on admission by the admitting psychiatrist.      Date of Admission: 7/13/2023    Date of Discharge:7/17/2023     TYPE OF DISCHARGE:   REGULAR -  YES    ADMISSION EVALUATION:  CHIEF COMPLAINT:  \"Dealt with depression all my life. \"     HISTORY OF PRESENTING COMPLAINT:  Jose Milian is a 40 y.o. White (non-) male who is currently admitted to the general side of 7th floor behavioral health Unit at [facility].      Patient says that he usually keep his emotions to himself and doesn't divulge how terrible he feels to others. However at this time he feels so low that he can't function even at his work. He is experiencing passive death wish and is afraid to end his life because of how low he feels. He's had several past suicide attempts, with one being serious enough to the extent that he was admitted to the ICU 3 years after an overdose. He describes experiencing anhedonia and low frustration tolerance. This hasn't affected his sleep or appetite, but it definitely impacted his function. No psychosis. No oscar. No fire-arms at home     PAST PSYCHIATRIC HISTORY   Several past inpatient psychiatric admissions, last being at [facility] in April. 1 suicide attempts, with overdose last being 3 years. Was in ICU. Therapy since 2000  No psychiastrist,  Last time seen last year. Tried prozac 40mg po for years  Zoloft unsure dose or length  Effexor unsure of dose or length  Tried cymbalta     Tried risperdal years ago as a teenager  No abilify,   Tired zyprexa  Can't tried seroquel, says his mother told him that it made him 'mean.'     ECT at [facility], which he felt it helped ([phone]). He later recalled that it may have been [facility], not [facility]. SUBSTANCE USE HISTORY:  Tobacco: 1ppd  Cannabis: cannabis since 8yo  Alcohol: very rare.   IVD: none  No Cocaine/Heroin/amphetamines/LSD/PCP/Ketamine

## 2023-07-17 NOTE — BH NOTE
Chief Complaint:  \"I'm ok with the medication, but my depression is still there. \"    Length of Stay: 4 Days    Interval History:  07/17/2023  Alissa Lacey says that he is tolerating the medication changes well. He hasn't reached out to any of his family or loved ones, as he says when he comes in for psychiatric treatment he waits until he is better to reach out. He is eating and sleeping well. Patient explicitly requests to start ECT and he is understanding of bed availability at the [facility] [place] being an issue. Passive death wish but no explicit Suicidal intents or plans. 7/16/23  Mr. Arias reports feeling \"the same\". States that he struggled with sleep and Trazodone makes him feel \"weird\". His mood remains depressed and hopeless. He has remained isolative to his room and feels he has no energy. Passive SI remains but denies any active plan. Remains motivated to get ECT. At the present time the patient Ye Lane remains compliant with taking medications. Denies any adverse events from taking them and feels they have been slightly beneficial.       7/15/23  Mr. Altamirano's reports feeling \"still very depressed\". He recalled the author from treatment with ECT at [facility] more than 7 years ago. He seems to remember developing sensitivty to Succinylcholine although this is not clear based on his history. He has a history of having had manic/hypomanic episodes and would meet criteria for Bipolar 1 Disorder. Says he slept fairly well last night and woke up feeling somewhat rested. At the present time the patient Ye Lane remains compliant with taking medications. Denies any adverse events from taking them and feels they have been beneficial.       Past Medical History:  History reviewed. No pertinent past medical history.         Labs:  Lab Results   Component Value Date/Time    WBC 8.8 07/13/2023 02:07 PM    HGB 14.5 07/13/2023 02:07 PM    HCT 43.3 07/13/2023 02:07 PM

## 2023-07-17 NOTE — BH NOTE
PRN Medication Documentation    Specific patient behavior that led to need for PRN medication: c/o anxiety  Staff interventions attempted prior to PRN being given: coping skills  PRN medication given: atarax  Patient response/effectiveness of PRN medication: tl aware  1930 Pt states\"im nervous regarding  upcoming ect tx. Note pt asking questions regarding discharge process. Discharge policy explained to patient  Pt appears fully aware of procedure related to ama. Tl aware. 2006- pt states\"I decided to stay at the hospital\"  Requesting something for anxiety.   Tl aware of above

## 2023-07-17 NOTE — DISCHARGE INSTRUCTIONS
DISCHARGE SUMMARY from Nurse    PATIENT INSTRUCTIONS:      What to do at Home:  Recommended activity: activity as tolerated,     If you experience any of the following symptoms thoughts of not feeling safe, suicidal thinking or increase in anxiety - inform staff immediately    *  Please give a list of your current medications to your Primary Care Provider. *  Please update this list whenever your medications are discontinued, doses are      changed, or new medications (including over-the-counter products) are added. *  Please carry medication information at all times in case of emergency situations. These are general instructions for a healthy lifestyle:    No smoking/ No tobacco products/ Avoid exposure to second hand smoke  Surgeon General's Warning:  Quitting smoking now greatly reduces serious risk to your health. Obesity, smoking, and sedentary lifestyle greatly increases your risk for illness    A healthy diet, regular physical exercise & weight monitoring are important for maintaining a healthy lifestyle    You may be retaining fluid if you have a history of heart failure or if you experience any of the following symptoms:  Weight gain of 3 pounds or more overnight or 5 pounds in a week, increased swelling in our hands or feet or shortness of breath while lying flat in bed. Please call your doctor as soon as you notice any of these symptoms; do not wait until your next office visit. The discharge information has been reviewed with the patient. The patient verbalized understanding. Discharge medications reviewed with the patient and appropriate educational materials and side effects teaching were provided.   ___________________________________________________________________________________________________________________________________

## 2023-07-17 NOTE — PLAN OF CARE
Problem: Self Harm/Suicidality  Goal: Will have no self-injury during hospital stay  Description: INTERVENTIONS:  1. Ensure constant observer at bedside with Q15M safety checks  2. Maintain a safe environment  3. Secure patient belongings  4. Ensure family/visitors adhere to safety recommendations  5. Ensure safety tray has been added to patient's diet order  6. Every shift and PRN: Re-assess suicidal risk via Frequent Screener    Outcome: Progressing  Flowsheets (Taken 7/15/2023 0830 by Sergey Paiz RN)  Will have no self-injury during hospital stay: Maintain a safe environment  Note: Denies SI, no self harming behaviors, no plan, no intent and no urge to self harm. Future focused stating he wants ECT     Problem: Depression  Goal: Will be euthymic at discharge  Description: INTERVENTIONS:  1. Administer medication as ordered  2. Provide emotional support via 1:1 interaction with staff  3. Encourage involvement in milieu/groups/activities  4. Monitor for social isolation  Outcome: Progressing  Note: Out on unit passively engaged, reports feeling depressed, insight into his irritability, communicated with Saroj Soliman at [facility] for ECT appointment and Access for transfer to [facility]. Awaiting a bed at this time. Pt verbalized understanding.

## 2023-07-17 NOTE — BH NOTE
Pt escorted to [facility] transport by RN in possession of discharge paperwork and all belongings. Report given to [facility]. All test results forwarded to next level of care by CM.

## 2023-07-18 VITALS
WEIGHT: 259.1 LBS | DIASTOLIC BLOOD PRESSURE: 89 MMHG | OXYGEN SATURATION: 96 % | HEIGHT: 70 IN | HEART RATE: 73 BPM | SYSTOLIC BLOOD PRESSURE: 134 MMHG | TEMPERATURE: 98.2 F | BODY MASS INDEX: 37.09 KG/M2 | RESPIRATION RATE: 18 BRPM

## 2023-07-18 PROBLEM — F32.A DEPRESSION: Status: ACTIVE | Noted: 2023-07-18

## 2023-07-18 PROCEDURE — 6370000000 HC RX 637 (ALT 250 FOR IP): Performed by: PSYCHIATRY & NEUROLOGY

## 2023-07-18 PROCEDURE — 6370000000 HC RX 637 (ALT 250 FOR IP)

## 2023-07-18 PROCEDURE — 99223 1ST HOSP IP/OBS HIGH 75: CPT | Performed by: PSYCHIATRY & NEUROLOGY

## 2023-07-18 RX ORDER — DOXEPIN HYDROCHLORIDE 25 MG/1
25 CAPSULE ORAL NIGHTLY
Status: DISCONTINUED | OUTPATIENT
Start: 2023-07-18 | End: 2023-07-18 | Stop reason: HOSPADM

## 2023-07-18 RX ORDER — DOXEPIN HYDROCHLORIDE 25 MG/1
25 CAPSULE ORAL NIGHTLY
Qty: 30 CAPSULE | Refills: 1 | Status: SHIPPED | OUTPATIENT
Start: 2023-07-18 | End: 2023-09-16

## 2023-07-18 RX ORDER — ARIPIPRAZOLE 15 MG/1
15 TABLET ORAL DAILY
Qty: 30 TABLET | Refills: 1 | Status: SHIPPED | OUTPATIENT
Start: 2023-07-18 | End: 2023-07-18 | Stop reason: SDUPTHER

## 2023-07-18 RX ORDER — ARIPIPRAZOLE 15 MG/1
15 TABLET ORAL DAILY
Qty: 30 TABLET | Refills: 1 | Status: SHIPPED | OUTPATIENT
Start: 2023-07-18 | End: 2023-09-16

## 2023-07-18 RX ORDER — LAMOTRIGINE 200 MG/1
200 TABLET ORAL 2 TIMES DAILY
Qty: 60 TABLET | Refills: 1 | Status: SHIPPED | OUTPATIENT
Start: 2023-07-18 | End: 2023-07-18 | Stop reason: SDUPTHER

## 2023-07-18 RX ORDER — DOXEPIN HYDROCHLORIDE 25 MG/1
25 CAPSULE ORAL NIGHTLY
Qty: 30 CAPSULE | Refills: 1 | Status: SHIPPED | OUTPATIENT
Start: 2023-07-18 | End: 2023-07-18 | Stop reason: SDUPTHER

## 2023-07-18 RX ORDER — LAMOTRIGINE 200 MG/1
200 TABLET ORAL 2 TIMES DAILY
Qty: 60 TABLET | Refills: 1 | Status: SHIPPED | OUTPATIENT
Start: 2023-07-18

## 2023-07-18 RX ORDER — LEVETIRACETAM 500 MG/1
1000 TABLET ORAL 2 TIMES DAILY
Status: DISCONTINUED | OUTPATIENT
Start: 2023-07-18 | End: 2023-07-18 | Stop reason: HOSPADM

## 2023-07-18 RX ORDER — LEVETIRACETAM 1000 MG/1
1000 TABLET ORAL 2 TIMES DAILY
Qty: 60 TABLET | Refills: 1 | Status: SHIPPED | OUTPATIENT
Start: 2023-07-18 | End: 2023-07-18 | Stop reason: SDUPTHER

## 2023-07-18 RX ORDER — LEVETIRACETAM 1000 MG/1
1000 TABLET ORAL 2 TIMES DAILY
Qty: 60 TABLET | Refills: 1 | Status: SHIPPED | OUTPATIENT
Start: 2023-07-18

## 2023-07-18 RX ORDER — ARIPIPRAZOLE 15 MG/1
15 TABLET ORAL DAILY
Status: DISCONTINUED | OUTPATIENT
Start: 2023-07-18 | End: 2023-07-18 | Stop reason: HOSPADM

## 2023-07-18 RX ADMIN — NICOTINE POLACRILEX 2 MG: 2 LOZENGE ORAL at 14:35

## 2023-07-18 RX ADMIN — LEVETIRACETAM 1000 MG: 500 TABLET, FILM COATED ORAL at 14:01

## 2023-07-18 RX ADMIN — NICOTINE POLACRILEX 2 MG: 2 LOZENGE ORAL at 10:53

## 2023-07-18 RX ADMIN — ARIPIPRAZOLE 15 MG: 15 TABLET ORAL at 14:01

## 2023-07-18 RX ADMIN — LAMOTRIGINE 150 MG: 100 TABLET ORAL at 14:01

## 2023-07-18 RX ADMIN — NICOTINE POLACRILEX 2 MG: 2 LOZENGE ORAL at 16:03

## 2023-07-18 RX ADMIN — NICOTINE POLACRILEX 2 MG: 2 LOZENGE ORAL at 08:49

## 2023-07-18 NOTE — BH NOTE
Pt A/O x 4, pleasant and cooperative. Pt denies SI/HI/AVH. Denied anxiety and depression. Although became anxious throughout day when told that he may not receive ECT treatments while here in the hospital. Pt given PRN Nicotine Lozenges throughout day. Pt states that he'd like to leave if not able to receive ECT treatments. Social work notified. Pt attending groups throughout day. Complaint with medications and meals. Denies further needs at this time.

## 2023-07-18 NOTE — BH NOTE
Discharge update: This writer spoke with patient in regards to concerns about ECT and the possibility of patient not being able to receive due to medical concerns. Patient was able to accept feedback well. SW provided patient with handouts on ways to manage his main mental health symptoms (anhedonia). Patient requested to leave due to patient most likely not being able to get ECT. Patient will discharge today AMA, but SW will provide follow up referral and send patient with scripts. SW will send patient via lyft to [facility] to  meds.         Lonnell Apgar, supervisee in social work

## 2023-07-18 NOTE — CARE COORDINATION
07/18/23 0825   ITP   Date of Plan 07/18/23   Date of Next Review 07/19/23   Primary Diagnosis Code Major depressive disorder   Barriers to Treatment Need for psychoeducation   Strengths Incorporated in Plan Acknowledging need for assistance; Natural supports   Plan of Care   Long Term Goal (LTG) Stated in patient/guardian terms To maintain stability independently   Short Term Goal 1   Short Term Goal 1 Client will learn and demonstrate effective coping skills   Baseline Functioning Client experiencing SI, forming plan to harm self   Target Client feels confident using healthy coping skills; does not feel compelled to harm self   Objectives Other (comment)  (Client will form new coping skills)   Intervention 1 Group therapy   Frequency Daily   Measured by Staff observation;Self report   Staff Responsible Clinical staff;[facility] staff   Intervention 2 Milieu therapy and support   Frequency Daily   Measured by Self report;Staff observation   Staff Responsible Clinical staff;[facility] staff   STG Goal 1 Status: Patient Appears to be  Progressing toward treatment plan goal   Short Term Goal 2   Short Term Goal 2 Client will maintain compliance with medication regime   Baseline Functioning Client stopped taking medication; reason unknown   Target Client complies with medication regimen that suits his individual needs   Objectives Other (comment)  (Client will take medication as prescribed in hospital)   Intervention 1 Monitor medications   Frequency Daily   Measured by Self report;Staff observation; Other (comment)  (Chart review)   Intervention 2 Group therapy   Frequency Daily   Measured by Staff observation;Self report   Staff Responsible Clinical staff;[facility] staff   STG Goal 2 Status: Patient Appears to be  Progressing toward treatment plan goal   Crisis/Safety/Discharge Plan   Crisis/Safety Plan Standard program interventions and protocol   Comprehensive Assessment Completion Date 07/18/23   Discharge Plan Return home with

## 2023-07-18 NOTE — PROGRESS NOTES
[facility] Transfer Pharmacy Medication Reconciliation    Information obtained from: [facility] records  RxQuery data available1: N/A    Comments/recommendations:  Medication list below represents medications patient was receiving at [facility] prior to transfer to [facility] for ECT. 1RxQuery pharmacy benefit data reflects medications filled and processed through the patient's insurance, however                this data does NOT capture whether the medication was picked up or is currently being taken by the patient. Patient allergies: Allergies as of 07/17/2023 - Fully Reviewed 07/17/2023   Allergen Reaction Noted    Iodine  06/22/2023    Nitrous oxide  06/22/2023    Shellfish allergy  06/22/2023    Succinylcholine  06/22/2023       Prior to Admission Medications   Prescriptions Last Dose Informant Patient Reported? Taking?    ARIPiprazole (ABILIFY) 15 MG tablet 7/17/2023 at 0851 Transfer Papers/EMS No No   Sig: Take 1 tablet by mouth daily   doxepin (SINEQUAN) 25 MG capsule 7/16/2023 at 2124 Transfer Papers/EMS No No   Sig: Take 1 capsule by mouth nightly   lamoTRIgine (LAMICTAL) 200 MG tablet 7/17/2023 at 0851 Self, Transfer Papers/EMS Yes No   Sig: Take 1 tablet by mouth 2 times daily   levETIRAcetam (KEPPRA) 1000 MG tablet 7/17/2023 at 0851 Self, Transfer Papers/EMS Yes No   Sig: Take 1 tablet by mouth 2 times daily   nicotine polacrilex (COMMIT) 4 MG lozenge  Transfer Papers/EMS No No   Sig: Take 1 lozenge by mouth every hour as needed for Smoking cessation      Facility-Administered Medications: None          Thank you,  Lenny William, PharmD, BCPS, [address] [place] [address], [address] [place] [address]: [phone] ([phone])  Pharmacy: [phone] ([phone])

## 2023-07-18 NOTE — BH NOTE
PSYCHOSOCIAL ASSESSMENT  :Patient identifying info:   Oniel Maldonado is a 40 y.o., male admitted 7/17/2023  4:33 PM     Presenting problem and precipitating factors: Pt presents to ED with SI for 24 hours with plan of overdose. Pt reports he has had previous suicide attempts via overdosing but could not remember years or details. Pt denies HI/AVH. Pt reports he moved to [facility] 2 months ago from [place]. Pt reports he has been diagnosed with MDD and that he was taking Prozac but he stopped taking it 2 days ago. Pt has had 7 ED visits in the past 3 months. Pt does have hx of seizures, last one was last week reportedly. Mental status assessment: Pt presents ao x 4. Pt denies SI/HI/AVH anxiety and depression at this time. Pt eye contact poor. Pt speech is soft. Pt affect flat. Pt reports appetite increase. Pt endorses hypersomnia. Pt insight and judgment poor    Strengths/Recreation/Coping Skills:Pt has housing. Pt is insured. [facility]    Plan: [address] [place] [address] ,[facility] PLAN [facility]: [id number]   Effective from: 7/1/2023 Subscriber: Roseline Sylvester ID: [id number]   Guarantor: PRIYA LOPEZ       Collateral information: None    Current psychiatric /substance abuse providers and contact info: None    Previous psychiatric/substance abuse providers and response to treatment: Pt reports he was prescribed Prozac but stopped taking it 2 days prior to admission. Family history of mental illness or substance abuse: Unable to assess    Substance abuse history:    Social History     Tobacco Use    Smoking status: Every Day     Packs/day: 1.00     Types: Cigarettes    Smokeless tobacco: Not on file   Substance Use Topics    Alcohol use: Never       History of biomedical complications associated with substance abuse: Pt reports he smokes 4 blunts a day.     Patient's current acceptance of treatment or motivation for change: moderate, pt is

## 2023-07-18 NOTE — BH NOTE
Behavioral Health Transition Record to Provider    Patient Name: Estuardo Foley  YOB: 1985  Medical Record Number: [id number]  Date of Admission: 7/13/2023  Date of Discharge: 7/17/23    Attending Provider: No att. providers found  Discharging Provider: Dr. Prachi Richmond   To contact this individual call [phone] and ask the  to page. If unavailable, ask to be transferred to [facility] Provider on call. [facility] Provider will be available on call 24/7 and during holidays. Primary Care Provider: No primary care provider on file. Allergies   Allergen Reactions    Iodine     Nitrous Oxide     Shellfish Allergy     Succinylcholine        Reason for Admission: ADMISSION EVALUATION:  CHIEF COMPLAINT:  \"Dealt with depression all my life. \"     HISTORY OF PRESENTING COMPLAINT:  Estuardo Foley is a 40 y.o. White (non-) male who is currently admitted to the general side of 7th floor behavioral health Unit at [facility].      Patient says that he usually keep his emotions to himself and doesn't divulge how terrible he feels to others. However at this time he feels so low that he can't function even at his work. He is experiencing passive death wish and is afraid to end his life because of how low he feels. He's had several past suicide attempts, with one being serious enough to the extent that he was admitted to the ICU 3 years after an overdose. He describes experiencing anhedonia and low frustration tolerance. This hasn't affected his sleep or appetite, but it definitely impacted his function. No psychosis. No oscar.   No fire-arms at home    Admission Diagnosis: Suicidal ideations [R45.851]  Major depressive disorder without psychotic features [F32.9]  Severe episode of recurrent major depressive disorder, without psychotic features ([address]) [F33.2]    * No surgery found *    Results for orders placed or performed during the hospital encounter of

## 2023-07-18 NOTE — DISCHARGE INSTRUCTIONS
DISCHARGE SUMMARY    NAME:Zach Arias  : 1985  MRN: [id number]    The patient Oniel Maldonado exhibits the ability to control behavior in a less restrictive environment. Patient's level of functioning is improving. No assaultive/destructive behavior has been observed for the past 24 hours. No suicidal/homicidal threat or behavior has been observed for the past 24 hours. There is no evidence of serious medication side effects. Patient has not been in physical or protective restraints for at least the past 24 hours. AMA DISCHARGE     If weapons involved, how are they secured? No access    Is patient aware of and in agreement with discharge plan? Yes    Arrangements for medication:  Prescriptions sent to pharmacy     Copy of discharge instructions to provider?:  yes    Arrangements for transportation home:  Roundtrip    Keep all follow up appointments as scheduled, continue to take prescribed medications per physician instructions.   Mental health crisis number:  092 or your local mental health crisis line number at [address] [place] [address] Emergency WARM LINE      1-902-572-MHAV (0092)      M-F: 9am to 9pm      Sat & Sun: 5pm - 9pm  National suicide prevention lines:                             2-161-SMFZTGE (3-418-888-5462)       5-289-947-TALK ([phone])    Crisis Text Line:  Text HOME to 810026

## 2023-07-18 NOTE — BH NOTE
Pt A/O x 4, ambulatory, calm and cooperative. Pt leaving hospital AMA due to not being able to receive ECT treatments while here. AVS education and medications reviewed with patient. Pt verbalized understanding. Denies further questions.

## 2023-07-18 NOTE — PROGRESS NOTES
MUSIC THERAPY GROUP PROGRESS NOTE        7/18/2023    The patient Munira osborn 40 y.o. male participated in [address] [place] [address] group on the [address] [place] [address] unit. Group time: 10:10-10:55    Personal goal for participation: Patient (pt) will actively participate in at least one-third of the group session time. Goal orientation:   personal       Group therapy participation: Passive    Therapeutic interventions: Identity Soundtrack; self-expression; Art; Mindfulness    Observation of participation: The patient (pt) participated passively during the entirety of the music therapy group session. Though pt engaged in following instructions and mindfulness experience provided by this music therapist (MT), pt was only present during receptive music experiences (music listening during live music experiences offered by this MT). Once this MT introduced the activity, pt exited, but re-entered the space as this MT sang and played a song that offered themes of encouragement and validation. As this song came to a close, pt expressed enjoyment in the music and completed the post-session survey before exiting the space.      CHANCE Ellis (Music Therapist, Board Certified)  [facility] [employer]

## 2023-07-19 ENCOUNTER — HOSPITAL ENCOUNTER (EMERGENCY)
Facility: HOSPITAL | Age: 38
Discharge: HOME | End: 2023-07-19
Attending: EMERGENCY MEDICINE
Payer: MEDICAID

## 2023-07-19 VITALS
OXYGEN SATURATION: 97 % | TEMPERATURE: 99 F | HEART RATE: 79 BPM | RESPIRATION RATE: 13 BRPM | DIASTOLIC BLOOD PRESSURE: 91 MMHG | SYSTOLIC BLOOD PRESSURE: 135 MMHG

## 2023-07-19 DIAGNOSIS — R56.9 SEIZURE (HCC): Primary | ICD-10-CM

## 2023-07-19 LAB
EKG ATRIAL RATE: 89 BPM
EKG DIAGNOSIS: NORMAL
EKG P AXIS: 57 DEGREES
EKG P-R INTERVAL: 148 MS
EKG Q-T INTERVAL: 362 MS
EKG QRS DURATION: 96 MS
EKG QTC CALCULATION (BAZETT): 440 MS
EKG R AXIS: 21 DEGREES
EKG T AXIS: 28 DEGREES
EKG VENTRICULAR RATE: 89 BPM

## 2023-07-19 PROCEDURE — 6370000000 HC RX 637 (ALT 250 FOR IP): Performed by: EMERGENCY MEDICINE

## 2023-07-19 PROCEDURE — 99283 EMERGENCY DEPT VISIT LOW MDM: CPT

## 2023-07-19 PROCEDURE — 93010 ELECTROCARDIOGRAM REPORT: CPT | Performed by: SPECIALIST

## 2023-07-19 PROCEDURE — 93005 ELECTROCARDIOGRAM TRACING: CPT | Performed by: EMERGENCY MEDICINE

## 2023-07-19 RX ORDER — LEVETIRACETAM 250 MG/1
1000 TABLET ORAL ONCE
Status: COMPLETED | OUTPATIENT
Start: 2023-07-19 | End: 2023-07-19

## 2023-07-19 RX ORDER — LAMOTRIGINE 100 MG/1
200 TABLET ORAL ONCE
Status: COMPLETED | OUTPATIENT
Start: 2023-07-19 | End: 2023-07-19

## 2023-07-19 RX ADMIN — LAMOTRIGINE 200 MG: 100 TABLET ORAL at 06:00

## 2023-07-19 RX ADMIN — LEVETIRACETAM 1000 MG: 250 TABLET, FILM COATED ORAL at 06:00

## 2023-07-19 ASSESSMENT — PAIN - FUNCTIONAL ASSESSMENT: PAIN_FUNCTIONAL_ASSESSMENT: 0-10

## 2023-07-19 ASSESSMENT — PAIN DESCRIPTION - LOCATION: LOCATION: HEAD

## 2023-07-19 ASSESSMENT — PAIN SCALES - GENERAL: PAINLEVEL_OUTOF10: 4

## 2023-07-19 ASSESSMENT — PAIN DESCRIPTION - ORIENTATION: ORIENTATION: POSTERIOR

## 2023-07-19 NOTE — ED NOTES
The patient left the Emergency Department ambulatory, alert and oriented and in no acute distress. The patient was encouraged to call or return to the ED for worsening issues or problems and was encouraged to schedule a follow up appointment for continuing care. The patient verbalized understanding of discharge instructions and all questions were answered. The patient has no further concerns at this time.          Brennon Arredondo RN  07/19/23 6181

## 2023-07-19 NOTE — DISCHARGE SUMMARY
1 day per patient's request.       STRENGTHS:  Exercising self-direction/Resourceful, Access to housing/residential stability, and Interpersonal/supportive relationships (family, friends, peers)                 DISPOSITION:    Home. Patient to f/u with psychiatric/psychotherapy appointments. Pt to f/u with internist as directed. FOLLOW-UP CARE:    Activity as tolerated  Regular Diety  Wound Care: none needed             PROGNOSIS:  Greatly dependent upon patient's ability to remain sober and to  f/u with psychiatric/psychotherapy appointments. DISCHARGE MEDICATIONS: (no changes made). Informed consent given for the use of following psychotropic medications.   Current Discharge Medication List        CONTINUE these medications which have CHANGED    Details   lamoTRIgine (LAMICTAL) 200 MG tablet Take 1 tablet by mouth 2 times daily  Qty: 60 tablet, Refills: 1      levETIRAcetam (KEPPRA) 1000 MG tablet Take 1 tablet by mouth 2 times daily  Qty: 60 tablet, Refills: 1      doxepin (SINEQUAN) 25 MG capsule Take 1 capsule by mouth nightly  Qty: 30 capsule, Refills: 1      ARIPiprazole (ABILIFY) 15 MG tablet Take 1 tablet by mouth daily  Qty: 30 tablet, Refills: 1           STOP taking these medications       nicotine polacrilex (COMMIT) 4 MG lozenge Comments:   Reason for Stopping:                                            SIGNED:    Leon Heard MD  7/19/2023

## 2023-07-19 NOTE — ED TRIAGE NOTES
Patient arrived via EMS with a CC of seizure. Patient was walking down the street when he started to experience an aura. Patient stated he woke up on the street. Seizure duration unknown. Patient has a hx of seizures. Recent seizure 1.5 weeks ago. Patient is compliant with medications (keppra and lamictal). Patient experiencing headache which he stated is common after his seizures.

## 2023-07-20 NOTE — ED PROVIDER NOTES
[address] EMERGENCY DEP  EMERGENCY DEPARTMENT ENCOUNTER      Pt Name: Phil Brush  MRN: [id number]  [address] [place] 1985  Date of evaluation: 7/19/2023  Provider: Aram Wade MD    [address]       Chief Complaint   Patient presents with    Seizures         HISTORY OF PRESENT ILLNESS   (Location/Symptom, Timing/Onset, Context/Setting, Quality, Duration, Modifying Factors, Severity)  Note limiting factors. Patient is a 28-year-old male with history of seizures who presents after seizure while at home. Patient reports he works in [employer], and every time he has a seizure he likes to come to get checked out because he does not like to have a seizure at work. Patient says this is only a second seizure in the last several months, and has an upcoming appointment with [facility] neurology. Reports compliance with his home meds, and reports that his morning meds are due in a few hours. Denies any complaints at this time. Patient does not want any blood work checked because it was checked recently during his inpatient psych admission to [facility]. He would like to drink water instead. Nursing Notes were reviewed. REVIEW OF SYSTEMS    Not Required     Review of Systems    PAST MEDICAL HISTORY   No past medical history on file. SURGICAL HISTORY     No past surgical history on file.     CURRENT MEDICATIONS       Discharge Medication List as of 7/19/2023  6:12 AM        CONTINUE these medications which have NOT CHANGED    Details   lamoTRIgine (LAMICTAL) 200 MG tablet Take 1 tablet by mouth 2 times daily, Disp-60 tablet, R-1Normal      levETIRAcetam (KEPPRA) 1000 MG tablet Take 1 tablet by mouth 2 times daily, Disp-60 tablet, R-1Normal      doxepin (SINEQUAN) 25 MG capsule Take 1 capsule by mouth nightly, Disp-30 capsule, R-1Normal      ARIPiprazole (ABILIFY) 15 MG tablet Take 1 tablet by mouth daily, Disp-30 tablet, R-1Normal             ALLERGIES     Iodine, Nitrous oxide, Shellfish

## 2023-07-25 NOTE — GROUP NOTE
Group Therapy Note    Date: 7/18/2023    Group Start Time: 0900  Group End Time: 1000  Group Topic: Topic Group    [address] 3 ACUTE BEHAV [facility]        Group Therapy Note    Attendees:        Patient's Goal:  To identify positive coping strategies       Status After Intervention:  Improved    Participation Level: Interactive    Participation Quality: Attentive and Sharing      Speech:  normal      Thought Process/Content: Logical      Affective Functioning: Congruent      Mood: euthymic      Level of consciousness:  Attentive      Response to Learning: Progressing to goal      Endings: None Reported    Modes of Intervention: Education      Discipline Responsible: Recreational Therapist      Signature:  Jim Irvin
No

## 2023-08-29 ENCOUNTER — HOSPITAL ENCOUNTER (OUTPATIENT)
Facility: HOSPITAL | Age: 38
Setting detail: OBSERVATION
Discharge: HOME | End: 2023-08-30
Attending: STUDENT IN AN ORGANIZED HEALTH CARE EDUCATION/TRAINING PROGRAM | Admitting: INTERNAL MEDICINE
Payer: MEDICAID

## 2023-08-29 ENCOUNTER — APPOINTMENT (OUTPATIENT)
Facility: HOSPITAL | Age: 38
End: 2023-08-29
Payer: MEDICAID

## 2023-08-29 DIAGNOSIS — Z91.148 NONCOMPLIANCE WITH MEDICATION REGIMEN: ICD-10-CM

## 2023-08-29 DIAGNOSIS — R56.9 SEIZURE (HCC): Primary | ICD-10-CM
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
## 2023-08-29 PROBLEM — F12.90 MARIJUANA USE: Status: ACTIVE | Noted: 2017-06-25

## 2023-08-29 PROBLEM — G40.909 RECURRENT SEIZURES (HCC): Status: ACTIVE | Noted: 2023-08-29

## 2023-08-29 PROBLEM — G40.409 SYMPTOMATIC GENERALIZED EPILEPSY (HCC): Status: ACTIVE | Noted: 2017-06-24

## 2023-08-29 PROBLEM — Z98.890 HISTORY OF CRANIOTOMY: Status: ACTIVE | Noted: 2017-06-23

## 2023-08-29 PROBLEM — S06.9XAA TBI (TRAUMATIC BRAIN INJURY) (HCC): Status: ACTIVE | Noted: 2017-09-28

## 2023-08-29 LAB
ALBUMIN SERPL-MCNC: 3.6 G/DL (ref 3.4–5)
ALBUMIN/GLOB SERPL: 1.2 (ref 0.8–1.7)
ALP SERPL-CCNC: 82 U/L (ref 45–117)
ALT SERPL-CCNC: 34 U/L (ref 16–61)
AMPHET UR QL SCN: NEGATIVE
ANION GAP SERPL CALC-SCNC: 3 MMOL/L (ref 3–18)
APPEARANCE UR: CLEAR
AST SERPL-CCNC: 14 U/L (ref 10–38)
BARBITURATES UR QL SCN: NEGATIVE
BASOPHILS # BLD: 0 K/UL (ref 0–0.1)
BASOPHILS NFR BLD: 1 % (ref 0–2)
BENZODIAZ UR QL: NEGATIVE
BILIRUB SERPL-MCNC: 0.2 MG/DL (ref 0.2–1)
BILIRUB UR QL: NEGATIVE
BUN SERPL-MCNC: 12 MG/DL (ref 7–18)
BUN/CREAT SERPL: 14 (ref 12–20)
CALCIUM SERPL-MCNC: 8.8 MG/DL (ref 8.5–10.1)
CANNABINOIDS UR QL SCN: POSITIVE
CHLORIDE SERPL-SCNC: 111 MMOL/L (ref 100–111)
CO2 SERPL-SCNC: 28 MMOL/L (ref 21–32)
COCAINE UR QL SCN: NEGATIVE
COLOR UR: YELLOW
CREAT SERPL-MCNC: 0.85 MG/DL (ref 0.6–1.3)
DIFFERENTIAL METHOD BLD: NORMAL
EKG ATRIAL RATE: 58 BPM
EKG DIAGNOSIS: NORMAL
EKG P AXIS: 36 DEGREES
EKG P-R INTERVAL: 154 MS
EKG Q-T INTERVAL: 418 MS
EKG QRS DURATION: 100 MS
EKG QTC CALCULATION (BAZETT): 410 MS
EKG R AXIS: -4 DEGREES
EKG T AXIS: 14 DEGREES
EKG VENTRICULAR RATE: 58 BPM
EOSINOPHIL # BLD: 0.2 K/UL (ref 0–0.4)
EOSINOPHIL NFR BLD: 2 % (ref 0–5)
ERYTHROCYTE [DISTWIDTH] IN BLOOD BY AUTOMATED COUNT: 12.5 % (ref 11.6–14.5)
ETHANOL SERPL-MCNC: <3 MG/DL (ref 0–3)
GLOBULIN SER CALC-MCNC: 2.9 G/DL (ref 2–4)
GLUCOSE SERPL-MCNC: 120 MG/DL (ref 74–99)
GLUCOSE UR STRIP.AUTO-MCNC: NEGATIVE MG/DL
HCT VFR BLD AUTO: 39.5 % (ref 36–48)
HGB BLD-MCNC: 13.6 G/DL (ref 13–16)
HGB UR QL STRIP: NEGATIVE
IMM GRANULOCYTES # BLD AUTO: 0 K/UL (ref 0–0.04)
IMM GRANULOCYTES NFR BLD AUTO: 0 % (ref 0–0.5)
KETONES UR QL STRIP.AUTO: NEGATIVE MG/DL
LACTATE SERPL-SCNC: 0.8 MMOL/L (ref 0.4–2)
LEUKOCYTE ESTERASE UR QL STRIP.AUTO: NEGATIVE
LYMPHOCYTES # BLD: 2.2 K/UL (ref 0.9–3.6)
LYMPHOCYTES NFR BLD: 30 % (ref 21–52)
Lab: ABNORMAL
MAGNESIUM SERPL-MCNC: 2.3 MG/DL (ref 1.6–2.6)
MCH RBC QN AUTO: 30.3 PG (ref 24–34)
MCHC RBC AUTO-ENTMCNC: 34.4 G/DL (ref 31–37)
MCV RBC AUTO: 88 FL (ref 78–100)
METHADONE UR QL: NEGATIVE
MONOCYTES # BLD: 0.6 K/UL (ref 0.05–1.2)
MONOCYTES NFR BLD: 8 % (ref 3–10)
NEUTS SEG # BLD: 4.3 K/UL (ref 1.8–8)
NEUTS SEG NFR BLD: 59 % (ref 40–73)
NITRITE UR QL STRIP.AUTO: NEGATIVE
NRBC # BLD: 0 K/UL (ref 0–0.01)
NRBC BLD-RTO: 0 PER 100 WBC
OPIATES UR QL: POSITIVE
PCP UR QL: NEGATIVE
PH UR STRIP: 5.5 (ref 5–8)
PHENYTOIN SERPL-MCNC: 1.8 UG/ML (ref 10–20)
PLATELET # BLD AUTO: 217 K/UL (ref 135–420)
PMV BLD AUTO: 10.8 FL (ref 9.2–11.8)
POTASSIUM SERPL-SCNC: 3.5 MMOL/L (ref 3.5–5.5)
PROT SERPL-MCNC: 6.5 G/DL (ref 6.4–8.2)
PROT UR STRIP-MCNC: NEGATIVE MG/DL
RBC # BLD AUTO: 4.49 M/UL (ref 4.35–5.65)
SODIUM SERPL-SCNC: 142 MMOL/L (ref 136–145)
SP GR UR REFRACTOMETRY: 1.02 (ref 1–1.03)
UROBILINOGEN UR QL STRIP.AUTO: 1 EU/DL (ref 0.2–1)
WBC # BLD AUTO: 7.4 K/UL (ref 4.6–13.2)

## 2023-08-29 PROCEDURE — 93010 ELECTROCARDIOGRAM REPORT: CPT | Performed by: INTERNAL MEDICINE

## 2023-08-29 PROCEDURE — 80177 DRUG SCRN QUAN LEVETIRACETAM: CPT

## 2023-08-29 PROCEDURE — 99222 1ST HOSP IP/OBS MODERATE 55: CPT | Performed by: INTERNAL MEDICINE

## 2023-08-29 PROCEDURE — G0378 HOSPITAL OBSERVATION PER HR: HCPCS

## 2023-08-29 PROCEDURE — 80185 ASSAY OF PHENYTOIN TOTAL: CPT

## 2023-08-29 PROCEDURE — 82077 ASSAY SPEC XCP UR&BREATH IA: CPT

## 2023-08-29 PROCEDURE — 96374 THER/PROPH/DIAG INJ IV PUSH: CPT

## 2023-08-29 PROCEDURE — 80053 COMPREHEN METABOLIC PANEL: CPT

## 2023-08-29 PROCEDURE — 85025 COMPLETE CBC W/AUTO DIFF WBC: CPT

## 2023-08-29 PROCEDURE — 83735 ASSAY OF MAGNESIUM: CPT

## 2023-08-29 PROCEDURE — 71046 X-RAY EXAM CHEST 2 VIEWS: CPT

## 2023-08-29 PROCEDURE — 94761 N-INVAS EAR/PLS OXIMETRY MLT: CPT

## 2023-08-29 PROCEDURE — 81003 URINALYSIS AUTO W/O SCOPE: CPT

## 2023-08-29 PROCEDURE — 2580000003 HC RX 258: Performed by: INTERNAL MEDICINE

## 2023-08-29 PROCEDURE — 93005 ELECTROCARDIOGRAM TRACING: CPT | Performed by: STUDENT IN AN ORGANIZED HEALTH CARE EDUCATION/TRAINING PROGRAM

## 2023-08-29 PROCEDURE — 6360000002 HC RX W HCPCS: Performed by: STUDENT IN AN ORGANIZED HEALTH CARE EDUCATION/TRAINING PROGRAM

## 2023-08-29 PROCEDURE — 6370000000 HC RX 637 (ALT 250 FOR IP): Performed by: STUDENT IN AN ORGANIZED HEALTH CARE EDUCATION/TRAINING PROGRAM

## 2023-08-29 PROCEDURE — 83605 ASSAY OF LACTIC ACID: CPT

## 2023-08-29 PROCEDURE — 80175 DRUG SCREEN QUAN LAMOTRIGINE: CPT

## 2023-08-29 PROCEDURE — 6370000000 HC RX 637 (ALT 250 FOR IP): Performed by: PSYCHIATRY & NEUROLOGY

## 2023-08-29 PROCEDURE — 70450 CT HEAD/BRAIN W/O DYE: CPT

## 2023-08-29 PROCEDURE — 80307 DRUG TEST PRSMV CHEM ANLYZR: CPT

## 2023-08-29 PROCEDURE — 99285 EMERGENCY DEPT VISIT HI MDM: CPT

## 2023-08-29 PROCEDURE — 6370000000 HC RX 637 (ALT 250 FOR IP): Performed by: INTERNAL MEDICINE

## 2023-08-29 RX ORDER — ONDANSETRON 4 MG/1
4 TABLET, ORALLY DISINTEGRATING ORAL EVERY 8 HOURS PRN
Status: DISCONTINUED | OUTPATIENT
Start: 2023-08-29 | End: 2023-08-30 | Stop reason: HOSPADM

## 2023-08-29 RX ORDER — DOXEPIN HYDROCHLORIDE 25 MG/1
25 CAPSULE ORAL NIGHTLY
Status: DISCONTINUED | OUTPATIENT
Start: 2023-08-29 | End: 2023-08-30 | Stop reason: HOSPADM

## 2023-08-29 RX ORDER — LEVETIRACETAM 500 MG/5ML
1500 INJECTION, SOLUTION, CONCENTRATE INTRAVENOUS ONCE
Status: COMPLETED | OUTPATIENT
Start: 2023-08-29 | End: 2023-08-29

## 2023-08-29 RX ORDER — ACETAMINOPHEN 650 MG/1
650 SUPPOSITORY RECTAL EVERY 6 HOURS PRN
Status: DISCONTINUED | OUTPATIENT
Start: 2023-08-29 | End: 2023-08-30 | Stop reason: HOSPADM

## 2023-08-29 RX ORDER — SODIUM CHLORIDE 9 MG/ML
INJECTION, SOLUTION INTRAVENOUS CONTINUOUS
Status: DISCONTINUED | OUTPATIENT
Start: 2023-08-29 | End: 2023-08-30 | Stop reason: HOSPADM

## 2023-08-29 RX ORDER — LEVETIRACETAM 500 MG/1
1000 TABLET ORAL 2 TIMES DAILY
Status: DISCONTINUED | OUTPATIENT
Start: 2023-08-29 | End: 2023-08-29

## 2023-08-29 RX ORDER — POTASSIUM CHLORIDE 7.45 MG/ML
10 INJECTION INTRAVENOUS PRN
Status: DISCONTINUED | OUTPATIENT
Start: 2023-08-29 | End: 2023-08-30 | Stop reason: HOSPADM

## 2023-08-29 RX ORDER — MAGNESIUM SULFATE IN WATER 40 MG/ML
2000 INJECTION, SOLUTION INTRAVENOUS PRN
Status: DISCONTINUED | OUTPATIENT
Start: 2023-08-29 | End: 2023-08-30 | Stop reason: HOSPADM

## 2023-08-29 RX ORDER — ACETAMINOPHEN 325 MG/1
650 TABLET ORAL EVERY 6 HOURS PRN
Status: DISCONTINUED | OUTPATIENT
Start: 2023-08-29 | End: 2023-08-30 | Stop reason: HOSPADM

## 2023-08-29 RX ORDER — ONDANSETRON 2 MG/ML
4 INJECTION INTRAMUSCULAR; INTRAVENOUS EVERY 6 HOURS PRN
Status: DISCONTINUED | OUTPATIENT
Start: 2023-08-29 | End: 2023-08-30 | Stop reason: HOSPADM

## 2023-08-29 RX ORDER — LAMOTRIGINE 100 MG/1
200 TABLET ORAL 2 TIMES DAILY
Status: DISCONTINUED | OUTPATIENT
Start: 2023-08-29 | End: 2023-08-30 | Stop reason: HOSPADM

## 2023-08-29 RX ORDER — PHENYTOIN 50 MG/1
100 TABLET, CHEWABLE ORAL 3 TIMES DAILY
Status: DISCONTINUED | OUTPATIENT
Start: 2023-08-29 | End: 2023-08-30 | Stop reason: HOSPADM

## 2023-08-29 RX ORDER — LEVETIRACETAM 500 MG/1
1500 TABLET ORAL 2 TIMES DAILY
Status: DISCONTINUED | OUTPATIENT
Start: 2023-08-29 | End: 2023-08-30 | Stop reason: HOSPADM

## 2023-08-29 RX ORDER — PHENYTOIN SODIUM 100 MG/1
1200 CAPSULE, EXTENDED RELEASE ORAL ONCE
Status: COMPLETED | OUTPATIENT
Start: 2023-08-29 | End: 2023-08-29

## 2023-08-29 RX ORDER — POLYETHYLENE GLYCOL 3350 17 G/17G
17 POWDER, FOR SOLUTION ORAL DAILY PRN
Status: DISCONTINUED | OUTPATIENT
Start: 2023-08-29 | End: 2023-08-30 | Stop reason: HOSPADM

## 2023-08-29 RX ORDER — ENOXAPARIN SODIUM 100 MG/ML
40 INJECTION SUBCUTANEOUS DAILY
Status: DISCONTINUED | OUTPATIENT
Start: 2023-08-29 | End: 2023-08-30 | Stop reason: HOSPADM

## 2023-08-29 RX ORDER — PHENYTOIN SODIUM 100 MG/1
100 CAPSULE, EXTENDED RELEASE ORAL
Status: COMPLETED | OUTPATIENT
Start: 2023-08-29 | End: 2023-08-29

## 2023-08-29 RX ADMIN — PHENYTOIN 100 MG: 50 TABLET, CHEWABLE ORAL at 21:30

## 2023-08-29 RX ADMIN — ARIPIPRAZOLE 15 MG: 10 TABLET ORAL at 13:25

## 2023-08-29 RX ADMIN — PHENYTOIN SODIUM 100 MG: 100 CAPSULE ORAL at 11:38

## 2023-08-29 RX ADMIN — PHENYTOIN SODIUM 1200 MG: 100 CAPSULE ORAL at 17:13

## 2023-08-29 RX ADMIN — LEVETIRACETAM 1500 MG: 100 INJECTION, SOLUTION, CONCENTRATE INTRAVENOUS at 08:46

## 2023-08-29 RX ADMIN — LAMOTRIGINE 200 MG: 100 TABLET ORAL at 13:24

## 2023-08-29 RX ADMIN — LEVETIRACETAM 1500 MG: 500 TABLET, FILM COATED ORAL at 21:30

## 2023-08-29 RX ADMIN — LAMOTRIGINE 200 MG: 100 TABLET ORAL at 21:30

## 2023-08-29 RX ADMIN — SODIUM CHLORIDE: 9 INJECTION, SOLUTION INTRAVENOUS at 13:27

## 2023-08-29 RX ADMIN — PHENYTOIN 100 MG: 50 TABLET, CHEWABLE ORAL at 14:48

## 2023-08-29 ASSESSMENT — ENCOUNTER SYMPTOMS
CHEST TIGHTNESS: 0
DIARRHEA: 0
BLOOD IN STOOL: 0
ABDOMINAL DISTENTION: 0
EYE PAIN: 0
SHORTNESS OF BREATH: 0
ABDOMINAL PAIN: 0
FACIAL SWELLING: 0
BACK PAIN: 0
CONSTIPATION: 0

## 2023-08-29 ASSESSMENT — PAIN SCALES - GENERAL
PAINLEVEL_OUTOF10: 0

## 2023-08-29 ASSESSMENT — PAIN - FUNCTIONAL ASSESSMENT: PAIN_FUNCTIONAL_ASSESSMENT: NONE - DENIES PAIN
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
## 2023-08-29 NOTE — PROGRESS NOTES
Lost IV access on patient. Patient is currently refusing to allow nursing to place another IV. Dr. Garcia Dumont to notify. Dr. Sergio Diaz okay with no IV access.

## 2023-08-29 NOTE — PROGRESS NOTES
Advance Care Planning     Advance Care Planning Inpatient Note  Spiritual Care Department    Today's Date: 8/29/2023  Unit: [address] request from [employer].       Health Care Decision Makers:       Secondary Decision Maker: Roxy Carlton - [phone]  Summary:  Verified Documents      Advance Care Planning Documents (Patient Wishes):  Healthcare Power of /Advance Directive Appointment of Health Care Agent     Assessment:       08/29/23 1754   Encounter Summary   Encounter Overview/Reason  Initial Encounter   Referral/Consult From:  [address] Family members   Last Encounter  08/29/23  ([id number])   Complexity of Encounter Moderate   Begin Time 1730   End Time  1736   Total Time Calculated 6 min   Spiritual/Emotional needs   Type Spiritual Support   Advance Care Planning   Type Care Preferences Addressed   Assessment/Intervention/Outcome   Assessment Sad;Coping   Intervention Active listening   Outcome Encouraged   Plan and Referrals   Plan/Referrals Continue to visit, (comment)         Interventions:  Reviewed but did not complete ACP document    Care Preferences Communicated:   No    Outcomes/Plan:  ACP Discussion: Completed    Electronically signed by Dea Carter [facility] on 8/29/2023 at 5:56 PM

## 2023-08-29 NOTE — ED NOTES
Pt stated that he had a seizure about an hour ago. Pt is taking Phenytoin, Lamictal and Keppra and is compliant as stated.      Nhan Roblesers, [address]  08/29/23 2823

## 2023-08-29 NOTE — ED TRIAGE NOTES
Pt reports I had to 2 seizures. States it has been awhile since he has seen a neurologist or had labs drawn.  Appointment is 1 month out.,

## 2023-08-29 NOTE — PROGRESS NOTES
Assumed care of patient at this time. Patient noted resting on stretcher connected to monitors and VSS. Patient states that he suffered from 2 seizures this morning. Pt states that he had a total of 7 seizures this month. Care ongoing.

## 2023-08-29 NOTE — PROGRESS NOTES
4 Eyes Skin Assessment     NAME:  Juanjose De Leon  YOB: 1985  MEDICAL RECORD NUMBER:  [id number]    The patient is being assessed for  Admission    I agree that at least one RN has performed a thorough Head to Toe Skin Assessment on the patient. ALL assessment sites listed below have been assessed. Areas assessed by both nurses:    Head, Face, Ears, Shoulders, Back, Chest, Arms, Elbows, Hands, Sacrum. Buttock, Coccyx, Ischium, and Legs. Feet and Heels        Does the Patient have a Wound?  No noted wound(s)       Erich Prevention initiated by RN: No  Wound Care Orders initiated by RN: No    Pressure Injury (Stage 3,4, Unstageable, DTI, NWPT, and Complex wounds) if present, place Wound referral order by RN under : No    New Ostomies, if present place, Ostomy referral order under : No     Nurse 1 eSignature: Electronically signed by Eliu Colon RN on 8/29/23 at 6:03 PM EDT    **SHARE this note so that the co-signing nurse can place an eSignature**    Nurse 2 eSignature: Danial Cranker, RN

## 2023-08-30 VITALS
OXYGEN SATURATION: 96 % | HEART RATE: 62 BPM | WEIGHT: 216 LBS | BODY MASS INDEX: 30.92 KG/M2 | TEMPERATURE: 98 F | DIASTOLIC BLOOD PRESSURE: 89 MMHG | HEIGHT: 70 IN | RESPIRATION RATE: 20 BRPM | SYSTOLIC BLOOD PRESSURE: 135 MMHG

## 2023-08-30 LAB
ALBUMIN SERPL-MCNC: 3.3 G/DL (ref 3.4–5)
ALBUMIN/GLOB SERPL: 1 (ref 0.8–1.7)
ALP SERPL-CCNC: 79 U/L (ref 45–117)
ALT SERPL-CCNC: 34 U/L (ref 16–61)
ANION GAP SERPL CALC-SCNC: 6 MMOL/L (ref 3–18)
AST SERPL-CCNC: 20 U/L (ref 10–38)
BASOPHILS # BLD: 0 K/UL (ref 0–0.1)
BASOPHILS NFR BLD: 1 % (ref 0–2)
BILIRUB SERPL-MCNC: 0.2 MG/DL (ref 0.2–1)
BUN SERPL-MCNC: 9 MG/DL (ref 7–18)
BUN/CREAT SERPL: 10 (ref 12–20)
CALCIUM SERPL-MCNC: 9 MG/DL (ref 8.5–10.1)
CHLORIDE SERPL-SCNC: 109 MMOL/L (ref 100–111)
CO2 SERPL-SCNC: 26 MMOL/L (ref 21–32)
CREAT SERPL-MCNC: 0.9 MG/DL (ref 0.6–1.3)
DIFFERENTIAL METHOD BLD: NORMAL
EOSINOPHIL # BLD: 0.2 K/UL (ref 0–0.4)
EOSINOPHIL NFR BLD: 4 % (ref 0–5)
ERYTHROCYTE [DISTWIDTH] IN BLOOD BY AUTOMATED COUNT: 12.5 % (ref 11.6–14.5)
GLOBULIN SER CALC-MCNC: 3.2 G/DL (ref 2–4)
GLUCOSE SERPL-MCNC: 109 MG/DL (ref 74–99)
HCT VFR BLD AUTO: 42.8 % (ref 36–48)
HGB BLD-MCNC: 14.3 G/DL (ref 13–16)
IMM GRANULOCYTES # BLD AUTO: 0 K/UL (ref 0–0.04)
IMM GRANULOCYTES NFR BLD AUTO: 0 % (ref 0–0.5)
LYMPHOCYTES # BLD: 2.6 K/UL (ref 0.9–3.6)
LYMPHOCYTES NFR BLD: 46 % (ref 21–52)
MCH RBC QN AUTO: 29.9 PG (ref 24–34)
MCHC RBC AUTO-ENTMCNC: 33.4 G/DL (ref 31–37)
MCV RBC AUTO: 89.4 FL (ref 78–100)
MONOCYTES # BLD: 0.5 K/UL (ref 0.05–1.2)
MONOCYTES NFR BLD: 9 % (ref 3–10)
NEUTS SEG # BLD: 2.3 K/UL (ref 1.8–8)
NEUTS SEG NFR BLD: 41 % (ref 40–73)
NRBC # BLD: 0 K/UL (ref 0–0.01)
NRBC BLD-RTO: 0 PER 100 WBC
PHOSPHATE SERPL-MCNC: 3.3 MG/DL (ref 2.5–4.9)
PLATELET # BLD AUTO: 216 K/UL (ref 135–420)
PMV BLD AUTO: 11.5 FL (ref 9.2–11.8)
POTASSIUM SERPL-SCNC: 3.8 MMOL/L (ref 3.5–5.5)
PROT SERPL-MCNC: 6.5 G/DL (ref 6.4–8.2)
RBC # BLD AUTO: 4.79 M/UL (ref 4.35–5.65)
SODIUM SERPL-SCNC: 141 MMOL/L (ref 136–145)
WBC # BLD AUTO: 5.7 K/UL (ref 4.6–13.2)

## 2023-08-30 PROCEDURE — 96372 THER/PROPH/DIAG INJ SC/IM: CPT

## 2023-08-30 PROCEDURE — 99232 SBSQ HOSP IP/OBS MODERATE 35: CPT | Performed by: PSYCHIATRY & NEUROLOGY

## 2023-08-30 PROCEDURE — G0378 HOSPITAL OBSERVATION PER HR: HCPCS

## 2023-08-30 PROCEDURE — 80053 COMPREHEN METABOLIC PANEL: CPT

## 2023-08-30 PROCEDURE — 6360000002 HC RX W HCPCS: Performed by: INTERNAL MEDICINE

## 2023-08-30 PROCEDURE — 84100 ASSAY OF PHOSPHORUS: CPT

## 2023-08-30 PROCEDURE — 36415 COLL VENOUS BLD VENIPUNCTURE: CPT

## 2023-08-30 PROCEDURE — 99239 HOSP IP/OBS DSCHRG MGMT >30: CPT | Performed by: PHYSICIAN ASSISTANT

## 2023-08-30 PROCEDURE — 85025 COMPLETE CBC W/AUTO DIFF WBC: CPT

## 2023-08-30 PROCEDURE — 6370000000 HC RX 637 (ALT 250 FOR IP): Performed by: INTERNAL MEDICINE

## 2023-08-30 PROCEDURE — 94761 N-INVAS EAR/PLS OXIMETRY MLT: CPT

## 2023-08-30 RX ORDER — LEVETIRACETAM 750 MG/1
1500 TABLET ORAL 2 TIMES DAILY
Qty: 120 TABLET | Refills: 0 | Status: SHIPPED | OUTPATIENT
Start: 2023-08-30

## 2023-08-30 RX ORDER — PHENYTOIN SODIUM 100 MG/1
300 CAPSULE, EXTENDED RELEASE ORAL DAILY
Qty: 90 CAPSULE | Refills: 0 | Status: SHIPPED | OUTPATIENT
Start: 2023-08-30

## 2023-08-30 RX ADMIN — LAMOTRIGINE 200 MG: 100 TABLET ORAL at 08:32

## 2023-08-30 RX ADMIN — ENOXAPARIN SODIUM 40 MG: 100 INJECTION SUBCUTANEOUS at 08:33

## 2023-08-30 RX ADMIN — ARIPIPRAZOLE 15 MG: 10 TABLET ORAL at 08:33

## 2023-08-30 RX ADMIN — LEVETIRACETAM 1500 MG: 500 TABLET, FILM COATED ORAL at 08:32

## 2023-08-30 RX ADMIN — PHENYTOIN 100 MG: 50 TABLET, CHEWABLE ORAL at 08:37

## 2023-08-30 ASSESSMENT — PAIN SCALES - GENERAL
PAINLEVEL_OUTOF10: 0

## 2023-08-30 NOTE — PROGRESS NOTES
Epilepsy/depression/non-compliance?/inadequate data base    S: He feels better today    O: He is more alert. A: Ready for discharge. He received a loading dose of phenytoin yesterday. It is unclear to me if he having seizures because of chronically low AED levels or if some of his spells are non convulsive and related to depression or anxiety. Rec: He should continue with Dilantin  mg once per day, Keppra 1500 BID, and lamotrigine 200 BID. He should see an outpatient neurologist in two weeks or less and have a trough Dilantin level drawn at the same time. He does not drive. I cancelled the EEG--not useful. Long term outpatient f/u much more important.

## 2023-08-30 NOTE — CARE COORDINATION
Case Management Assessment  Initial Evaluation    Date/Time of Evaluation: 8/30/2023 11:41 AM  Assessment Completed by: Elis Brian RN    If patient is discharged prior to next notation, then this note serves as note for discharge by case management. Patient Name: Afia Centeno                   YOB: 1985  Diagnosis: Recurrent seizures ([address]) [G40.909]  Seizures ([address]) [R56.9]                   Date / Time: 8/29/2023  7:51 AM    Patient Admission Status: Observation   Readmission Risk (Low < 19, Mod (19-27), High > 27): Readmission Risk Score: 12.9    Current PCP: No primary care provider on file. PCP verified by CM? (P) No (pt states he currently has no PCP)    Chart Reviewed: Yes      History Provided by: (P) Patient  Patient Orientation: (P) Alert and Oriented    Patient Cognition: (P) Alert    Hospitalization in the last 30 days (Readmission):  No    If yes, Readmission Assessment in CM Navigator will be completed.     Advance Directives:      Code Status: Full Code   Patient's Primary Decision Maker is:      Secondary Decision Maker: Lucina Kline - Unknown - [phone]    Discharge Planning:    Patient lives with: (P) Spouse/Significant Other Type of Home: (P) House  Primary Care Giver: (P) Self  Patient Support Systems include: (P) Spouse/Significant Other, Friends/Neighbors, Family Members   Current Financial resources: (P) Medicaid  Current community resources:    Current services prior to admission: (P) None            Current DME:              Type of Home Care services:  (P) None    ADLS  Prior functional level: (P) Independent in ADLs/IADLs  Current functional level: (P) Independent in ADLs/IADLs    PT AM-PAC:   /24  OT AM-PAC:   /24    Family can provide assistance at DC: (P) Yes  Would you like Case Management to discuss the discharge plan with any other family members/significant others, and if so, who? (P) Yes ()  Plans to Return to Present Housing: (P) Yes  Other

## 2023-08-30 NOTE — DISCHARGE INSTRUCTIONS
DISCHARGE SUMMARY from Nurse    PATIENT INSTRUCTIONS:    After general anesthesia or intravenous sedation, for 24 hours or while taking prescription Narcotics:  Limit your activities  Do not drive and operate hazardous machinery  Do not make important personal or business decisions  Do  not drink alcoholic beverages  If you have not urinated within 8 hours after discharge, please contact your surgeon on call. Report the following to your surgeon:  Excessive pain, swelling, redness or odor of or around the surgical area  Temperature over 100.5  Nausea and vomiting lasting longer than 4 hours or if unable to take medications  Any signs of decreased circulation or nerve impairment to extremity: change in color, persistent  numbness, tingling, coldness or increase pain  Any questions    What to do at Home:  Recommended activity: activity as tolerated,     If you experience any of the following symptoms chest pain, headache, pain unrelieved by medication, please follow up with Primary Care Provider or return to the emergency room. *  Please give a list of your current medications to your Primary Care Provider. *  Please update this list whenever your medications are discontinued, doses are      changed, or new medications (including over-the-counter products) are added. *  Please carry medication information at all times in case of emergency situations. These are general instructions for a healthy lifestyle:    No smoking/ No tobacco products/ Avoid exposure to second hand smoke  Surgeon General's Warning:  Quitting smoking now greatly reduces serious risk to your health.     Obesity, smoking, and sedentary lifestyle greatly increases your risk for illness    A healthy diet, regular physical exercise & weight monitoring are important for maintaining a healthy lifestyle    You may be retaining fluid if you have a history of heart failure or if you experience any of the following symptoms:  Weight gain of 3 pounds or more overnight or 5 pounds in a week, increased swelling in our hands or feet or shortness of breath while lying flat in bed. Please call your doctor as soon as you notice any of these symptoms; do not wait until your next office visit. The discharge information has been reviewed with the patient. The patient verbalized understanding. Discharge medications reviewed with the patient and appropriate educational materials and side effects teaching were provided. Patient armband removed and shredded.     ___________________________________________________________________________________________________________________________________

## 2023-08-30 NOTE — PROGRESS NOTES
Discharge order noted for today. Orders received. No needs identified at this time. Case management remains available as needed. Bus ticket provided to patient for transportation home.     [facility] RN LETICIA  Case Management

## 2023-08-30 NOTE — PROGRESS NOTES
Patient discharged home all lines removed. Education provided patient expresses understanding.  Vital signs WDL all scripts received

## 2023-08-30 NOTE — PLAN OF CARE
Problem: Safety - Adult  Goal: Free from fall injury  Outcome: Progressing     Problem: Pain  Goal: Verbalizes/displays adequate comfort level or baseline comfort level  Outcome: Progressing  Flowsheets (Taken 8/29/2023 1313 by Hal Tucker RN)  Verbalizes/displays adequate comfort level or baseline comfort level:   Encourage patient to monitor pain and request assistance   Assess pain using appropriate pain scale   Administer analgesics based on type and severity of pain and evaluate response

## 2023-08-31 LAB
LAMOTRIGINE SERPL-MCNC: 4.1 UG/ML (ref 2–20)
LEVETIRACETAM SERPL-MCNC: 26.9 UG/ML (ref 10–40)

## 2023-09-06 PROBLEM — R78.89: Status: ACTIVE | Noted: 2023-09-06

## 2023-09-06 PROBLEM — Z91.148 NONCOMPLIANCE WITH MEDICATION REGIMEN: Status: ACTIVE | Noted: 2017-10-09

## 2023-09-06 NOTE — DISCHARGE SUMMARY
was given a phenytoin loading dose. He had no further seizures and was cleared for discharge by neuro. Patient instructed to follow up with his primary neurologist and psychiatrist. Amisha Mari of recommended dosages of AEDs were sent to his pharmacy if he didn't have them at home. Physical Exam:  General appearance: alert, cooperative, no distress, appears stated age  Head: Normocephalic, without obvious abnormality, atraumatic  Lungs: clear to auscultation bilaterally  Heart: regular rate and rhythm, S1, S2 normal, no murmur, click, rub or gallop  Abdomen: soft, non-tender. Bowel sounds normal. No masses,  no organomegaly  Extremities: no cyanosis or edema  Skin: Skin color, texture normal. No rashes or lesions  Neurologic: no focal deficits, motor/sensory intact  PSY: Mood and affect normal, appropriately behaved    Significant Diagnostic Studies:     No results found for this or any previous visit (from the past 24 hour(s)). XR CHEST (2 VW)    Result Date: 8/29/2023  PA lateral CXR: HISTORY: Seizure. Comparison 7/15/2023 Cardiac silhouette and vascularity within normal limits. Lungs and costophrenic angles are clear. Mild eventration of right hemidiaphragm, stable. No acute pulmonary disease. CT HEAD WO CONTRAST (Select for new onset seizures or head trauma)    Result Date: 8/29/2023  CT OF THE HEAD WITHOUT CONTRAST. CLINICAL HISTORY: Seizures. TECHNIQUE: Helical scan obtained of the head were obtained from the skull vertex through the base of the skull without intravenous contrast.    All CT scans are performed using dose optimization techniques as appropriate to the performed exam including the following: Automated exposure control, adjustment of mA and/or kV according to patient size, and use of iterative reconstructive technique. COMPARISON: 10/7/2018. FINDINGS: Redemonstration of anterior right frontal lobe encephalomalacia.  The sulcal pattern and ventricular system are otherwise normal in size

## 2024-11-07 NOTE — PROGRESS NOTE BEHAVIORAL HEALTH - NS ED [facility] MED ROS ENDOCRINE
This patient has been assessed with a concern for Malnutrition and was treated during this hospitalization for the following Nutrition diagnosis/diagnoses:     -  10/22/2024: Severe protein-calorie malnutrition   -  10/06/2024: Severe protein-calorie malnutrition   -  10/06/2024: Underweight (BMI < 19)  
No complaints